# Patient Record
Sex: MALE | Race: WHITE | NOT HISPANIC OR LATINO | Employment: OTHER | URBAN - METROPOLITAN AREA
[De-identification: names, ages, dates, MRNs, and addresses within clinical notes are randomized per-mention and may not be internally consistent; named-entity substitution may affect disease eponyms.]

---

## 2019-07-03 PROBLEM — R10.13 ABDOMINAL PAIN, EPIGASTRIC: Status: ACTIVE | Noted: 2019-07-03

## 2021-03-24 ENCOUNTER — HOSPITAL ENCOUNTER (OUTPATIENT)
Dept: SLEEP MEDICINE | Facility: HOSPITAL | Age: 61
Discharge: HOME | End: 2021-03-24
Attending: FAMILY MEDICINE
Payer: MEDICARE

## 2021-03-24 DIAGNOSIS — G47.33 OBSTRUCTIVE SLEEP APNEA (ADULT) (PEDIATRIC): ICD-10-CM

## 2021-03-24 PROCEDURE — 95810 POLYSOM 6/> YRS 4/> PARAM: CPT

## 2021-07-08 PROBLEM — R06.03 ACUTE RESPIRATORY DISTRESS: Status: ACTIVE | Noted: 2021-07-08

## 2021-07-08 PROBLEM — J44.1 COPD EXACERBATION: Status: ACTIVE | Noted: 2021-07-08

## 2021-07-08 PROBLEM — R06.02 SHORTNESS OF BREATH: Status: ACTIVE | Noted: 2021-07-08

## 2021-07-08 PROBLEM — I50.9 CHF EXACERBATION: Status: ACTIVE | Noted: 2021-07-08

## 2021-07-09 PROBLEM — J96.11 CHRONIC RESPIRATORY FAILURE WITH HYPOXIA: Status: ACTIVE | Noted: 2021-07-09

## 2021-07-09 PROBLEM — F17.210 SMOKING GREATER THAN 40 PACK YEARS: Status: ACTIVE | Noted: 2021-07-09

## 2021-07-09 PROBLEM — N18.4 CKD (CHRONIC KIDNEY DISEASE), STAGE IV: Status: ACTIVE | Noted: 2021-07-09

## 2021-07-11 PROBLEM — J96.21 ACUTE ON CHRONIC RESPIRATORY FAILURE WITH HYPOXEMIA: Status: ACTIVE | Noted: 2021-07-08

## 2021-07-13 PROBLEM — E87.5 HYPERKALEMIA: Status: ACTIVE | Noted: 2021-07-13

## 2022-04-05 PROBLEM — I21.4 NSTEMI (NON-ST ELEVATED MYOCARDIAL INFARCTION): Status: ACTIVE | Noted: 2022-04-05

## 2022-04-05 PROBLEM — G92.9 ENCEPHALOPATHY, TOXIC: Status: ACTIVE | Noted: 2022-04-05

## 2022-04-05 PROBLEM — A41.9 SEVERE SEPSIS: Status: ACTIVE | Noted: 2022-04-05

## 2022-04-05 PROBLEM — F19.10 POLYSUBSTANCE ABUSE: Status: ACTIVE | Noted: 2022-04-05

## 2022-04-05 PROBLEM — I48.0 PAROXYSMAL ATRIAL FIBRILLATION: Status: ACTIVE | Noted: 2022-04-05

## 2022-04-05 PROBLEM — I50.40 HEART FAILURE WITH REDUCED EJECTION FRACTION AND DIASTOLIC DYSFUNCTION: Status: ACTIVE | Noted: 2022-04-05

## 2022-04-05 PROBLEM — R16.0 LIVER MASS: Status: ACTIVE | Noted: 2022-04-05

## 2022-04-05 PROBLEM — J18.9 LEFT LOWER LOBE PNEUMONIA: Status: ACTIVE | Noted: 2022-04-05

## 2022-04-05 PROBLEM — R65.20 SEVERE SEPSIS: Status: ACTIVE | Noted: 2022-04-05

## 2022-04-06 PROBLEM — E63.9 INADEQUATE DIETARY ENERGY INTAKE: Status: ACTIVE | Noted: 2022-04-06

## 2022-04-08 PROBLEM — R63.8 INADEQUATE ORAL INTAKE: Status: ACTIVE | Noted: 2022-04-06

## 2022-04-10 PROBLEM — F05 VASCULAR DEMENTIA WITH DELIRIUM: Status: ACTIVE | Noted: 2022-04-10

## 2022-04-10 PROBLEM — Z79.899 POLYPHARMACY: Status: ACTIVE | Noted: 2022-04-10

## 2022-04-10 PROBLEM — R78.81 ENTEROCOCCAL BACTEREMIA: Status: ACTIVE | Noted: 2022-04-10

## 2022-04-10 PROBLEM — Z86.73 HISTORY OF CEREBROVASCULAR ACCIDENT (CVA) DUE TO ISCHEMIA: Status: ACTIVE | Noted: 2022-04-10

## 2022-04-10 PROBLEM — K76.82 HEPATIC ENCEPHALOPATHY: Status: ACTIVE | Noted: 2022-04-10

## 2022-04-10 PROBLEM — E03.9 HYPOTHYROIDISM: Status: ACTIVE | Noted: 2022-04-10

## 2022-04-10 PROBLEM — I25.10 CAD (CORONARY ARTERY DISEASE): Status: ACTIVE | Noted: 2022-04-10

## 2022-04-10 PROBLEM — R74.01 TRANSAMINASEMIA: Status: ACTIVE | Noted: 2022-04-10

## 2022-04-10 PROBLEM — E66.09 CLASS 1 OBESITY DUE TO EXCESS CALORIES WITH SERIOUS COMORBIDITY AND BODY MASS INDEX (BMI) OF 33.0 TO 33.9 IN ADULT: Status: ACTIVE | Noted: 2022-04-10

## 2022-04-10 PROBLEM — K74.60 CIRRHOSIS OF LIVER WITHOUT ASCITES: Status: ACTIVE | Noted: 2022-04-10

## 2022-04-10 PROBLEM — F01.50 VASCULAR DEMENTIA WITH DELIRIUM: Status: ACTIVE | Noted: 2022-04-10

## 2022-04-10 PROBLEM — R79.89 TROPONIN LEVEL ELEVATED: Status: ACTIVE | Noted: 2022-04-10

## 2022-04-10 PROBLEM — I50.22 CHRONIC HFREF (HEART FAILURE WITH REDUCED EJECTION FRACTION): Status: ACTIVE | Noted: 2022-04-10

## 2022-04-10 PROBLEM — R13.12 OROPHARYNGEAL DYSPHAGIA: Status: ACTIVE | Noted: 2022-04-10

## 2022-04-10 PROBLEM — F15.20 METHAMPHETAMINE DEPENDENCE: Status: ACTIVE | Noted: 2022-04-10

## 2022-04-10 PROBLEM — J44.9 STAGE 3 SEVERE COPD BY GOLD CLASSIFICATION: Status: ACTIVE | Noted: 2021-07-09

## 2022-04-10 PROBLEM — E87.6 HYPOKALEMIA: Status: ACTIVE | Noted: 2022-04-10

## 2022-04-10 PROBLEM — B95.2 ENTEROCOCCAL BACTEREMIA: Status: ACTIVE | Noted: 2022-04-10

## 2022-04-12 PROBLEM — R63.8 INADEQUATE ORAL INTAKE: Status: ACTIVE | Noted: 2022-04-12

## 2022-07-25 PROBLEM — S22.081A BURST FRACTURE OF T12 VERTEBRA: Status: ACTIVE | Noted: 2022-07-25

## 2022-07-26 PROBLEM — F13.20 BENZODIAZEPINE DEPENDENCE: Status: ACTIVE | Noted: 2022-07-26

## 2022-07-26 PROBLEM — D50.9 MICROCYTIC ANEMIA: Status: ACTIVE | Noted: 2022-07-26

## 2022-07-26 PROBLEM — F33.1 MDD (MAJOR DEPRESSIVE DISORDER), RECURRENT EPISODE, MODERATE: Status: ACTIVE | Noted: 2022-07-26

## 2022-07-26 PROBLEM — J96.11 CHRONIC RESPIRATORY FAILURE WITH HYPOXIA: Status: ACTIVE | Noted: 2021-07-08

## 2022-07-26 PROBLEM — N18.32 STAGE 3B CHRONIC KIDNEY DISEASE: Status: ACTIVE | Noted: 2022-07-26

## 2022-07-28 PROBLEM — E63.9 INADEQUATE DIETARY ENERGY INTAKE: Status: ACTIVE | Noted: 2022-07-28

## 2022-08-13 PROBLEM — D72.819 LEUKOPENIA: Status: ACTIVE | Noted: 2022-08-13

## 2022-08-13 PROBLEM — E87.20 METABOLIC ACIDEMIA: Status: ACTIVE | Noted: 2022-08-13

## 2022-08-13 PROBLEM — R73.9 STEROID-INDUCED HYPERGLYCEMIA: Status: ACTIVE | Noted: 2022-08-13

## 2022-08-13 PROBLEM — D69.6 THROMBOCYTOPENIA: Status: ACTIVE | Noted: 2022-08-13

## 2022-08-13 PROBLEM — N17.9 AKI (ACUTE KIDNEY INJURY): Status: ACTIVE | Noted: 2022-08-13

## 2022-08-13 PROBLEM — T38.0X5A STEROID-INDUCED HYPERGLYCEMIA: Status: ACTIVE | Noted: 2022-08-13

## 2022-08-21 PROBLEM — N17.9 AKI (ACUTE KIDNEY INJURY): Status: RESOLVED | Noted: 2022-08-13 | Resolved: 2022-08-21

## 2022-08-21 PROBLEM — R13.12 OROPHARYNGEAL DYSPHAGIA: Status: RESOLVED | Noted: 2022-04-10 | Resolved: 2022-08-21

## 2022-08-21 PROBLEM — J96.11 CHRONIC RESPIRATORY FAILURE WITH HYPOXIA: Status: RESOLVED | Noted: 2021-07-08 | Resolved: 2022-08-21

## 2022-08-21 PROBLEM — E03.9 HYPOTHYROIDISM: Status: RESOLVED | Noted: 2022-04-10 | Resolved: 2022-08-21

## 2022-08-21 PROBLEM — E87.6 HYPOKALEMIA: Status: RESOLVED | Noted: 2022-04-10 | Resolved: 2022-08-21

## 2022-08-21 PROBLEM — Z86.73 HISTORY OF CEREBROVASCULAR ACCIDENT (CVA) DUE TO ISCHEMIA: Status: RESOLVED | Noted: 2022-04-10 | Resolved: 2022-08-21

## 2022-08-21 PROBLEM — F17.210 SMOKING GREATER THAN 40 PACK YEARS: Status: RESOLVED | Noted: 2021-07-09 | Resolved: 2022-08-21

## 2022-08-25 ENCOUNTER — HOSPITAL ENCOUNTER (INPATIENT)
Facility: HOSPITAL | Age: 62
LOS: 4 days | Discharge: NURSING, REHAB OR LONG-TERM CARE | DRG: 638 | End: 2022-08-29
Attending: STUDENT IN AN ORGANIZED HEALTH CARE EDUCATION/TRAINING PROGRAM | Admitting: INTERNAL MEDICINE
Payer: MEDICARE

## 2022-08-25 DIAGNOSIS — R55 SYNCOPE: ICD-10-CM

## 2022-08-25 DIAGNOSIS — D64.9 ANEMIA, UNSPECIFIED TYPE: ICD-10-CM

## 2022-08-25 DIAGNOSIS — N28.9 RENAL INSUFFICIENCY: ICD-10-CM

## 2022-08-25 DIAGNOSIS — E16.2 HYPOGLYCEMIA: Primary | ICD-10-CM

## 2022-08-25 LAB
ALBUMIN SERPL BCP-MCNC: 2.8 G/DL (ref 3.5–5.2)
ALP SERPL-CCNC: 117 U/L (ref 55–135)
ALT SERPL W/O P-5'-P-CCNC: 14 U/L (ref 10–44)
ANION GAP SERPL CALC-SCNC: 8 MMOL/L (ref 8–16)
AST SERPL-CCNC: 26 U/L (ref 10–40)
BASOPHILS # BLD AUTO: 0.02 K/UL (ref 0–0.2)
BASOPHILS NFR BLD: 0.2 % (ref 0–1.9)
BILIRUB SERPL-MCNC: 0.4 MG/DL (ref 0.1–1)
BUN SERPL-MCNC: 37 MG/DL (ref 8–23)
CALCIUM SERPL-MCNC: 8.9 MG/DL (ref 8.7–10.5)
CHLORIDE SERPL-SCNC: 106 MMOL/L (ref 95–110)
CO2 SERPL-SCNC: 26 MMOL/L (ref 23–29)
CREAT SERPL-MCNC: 3.3 MG/DL (ref 0.5–1.4)
DIFFERENTIAL METHOD: ABNORMAL
EOSINOPHIL # BLD AUTO: 0.1 K/UL (ref 0–0.5)
EOSINOPHIL NFR BLD: 0.7 % (ref 0–8)
ERYTHROCYTE [DISTWIDTH] IN BLOOD BY AUTOMATED COUNT: 15.7 % (ref 11.5–14.5)
EST. GFR  (NO RACE VARIABLE): 20.3 ML/MIN/1.73 M^2
GLUCOSE SERPL-MCNC: 17 MG/DL (ref 70–110)
HCT VFR BLD AUTO: 33.5 % (ref 40–54)
HGB BLD-MCNC: 10.4 G/DL (ref 14–18)
IMM GRANULOCYTES # BLD AUTO: 0.05 K/UL (ref 0–0.04)
IMM GRANULOCYTES NFR BLD AUTO: 0.4 % (ref 0–0.5)
LYMPHOCYTES # BLD AUTO: 0.8 K/UL (ref 1–4.8)
LYMPHOCYTES NFR BLD: 6.1 % (ref 18–48)
MAGNESIUM SERPL-MCNC: 2.4 MG/DL (ref 1.6–2.6)
MCH RBC QN AUTO: 25.1 PG (ref 27–31)
MCHC RBC AUTO-ENTMCNC: 31 G/DL (ref 32–36)
MCV RBC AUTO: 81 FL (ref 82–98)
MONOCYTES # BLD AUTO: 0.9 K/UL (ref 0.3–1)
MONOCYTES NFR BLD: 7 % (ref 4–15)
NEUTROPHILS # BLD AUTO: 10.8 K/UL (ref 1.8–7.7)
NEUTROPHILS NFR BLD: 85.6 % (ref 38–73)
NRBC BLD-RTO: 0 /100 WBC
PLATELET # BLD AUTO: 175 K/UL (ref 150–450)
PMV BLD AUTO: 11.5 FL (ref 9.2–12.9)
POCT GLUCOSE: 106 MG/DL (ref 70–110)
POCT GLUCOSE: 137 MG/DL (ref 70–110)
POCT GLUCOSE: 176 MG/DL (ref 70–110)
POCT GLUCOSE: 311 MG/DL (ref 70–110)
POCT GLUCOSE: 412 MG/DL (ref 70–110)
POCT GLUCOSE: 45 MG/DL (ref 70–110)
POCT GLUCOSE: 48 MG/DL (ref 70–110)
POCT GLUCOSE: 51 MG/DL (ref 70–110)
POCT GLUCOSE: <20 MG/DL (ref 70–110)
POCT GLUCOSE: <20 MG/DL (ref 70–110)
POTASSIUM SERPL-SCNC: 3.7 MMOL/L (ref 3.5–5.1)
PROT SERPL-MCNC: 7.4 G/DL (ref 6–8.4)
RBC # BLD AUTO: 4.15 M/UL (ref 4.6–6.2)
SODIUM SERPL-SCNC: 140 MMOL/L (ref 136–145)
TROPONIN I SERPL DL<=0.01 NG/ML-MCNC: 21.9 PG/ML (ref 0–60)
WBC # BLD AUTO: 12.62 K/UL (ref 3.9–12.7)

## 2022-08-25 PROCEDURE — 96376 TX/PRO/DX INJ SAME DRUG ADON: CPT

## 2022-08-25 PROCEDURE — 93005 ELECTROCARDIOGRAM TRACING: CPT

## 2022-08-25 PROCEDURE — 80053 COMPREHEN METABOLIC PANEL: CPT | Performed by: STUDENT IN AN ORGANIZED HEALTH CARE EDUCATION/TRAINING PROGRAM

## 2022-08-25 PROCEDURE — 84484 ASSAY OF TROPONIN QUANT: CPT | Performed by: STUDENT IN AN ORGANIZED HEALTH CARE EDUCATION/TRAINING PROGRAM

## 2022-08-25 PROCEDURE — 83735 ASSAY OF MAGNESIUM: CPT | Performed by: STUDENT IN AN ORGANIZED HEALTH CARE EDUCATION/TRAINING PROGRAM

## 2022-08-25 PROCEDURE — 20000000 HC ICU ROOM

## 2022-08-25 PROCEDURE — 82962 GLUCOSE BLOOD TEST: CPT

## 2022-08-25 PROCEDURE — 93010 EKG 12-LEAD: ICD-10-PCS | Mod: ,,, | Performed by: INTERNAL MEDICINE

## 2022-08-25 PROCEDURE — 36415 COLL VENOUS BLD VENIPUNCTURE: CPT | Performed by: STUDENT IN AN ORGANIZED HEALTH CARE EDUCATION/TRAINING PROGRAM

## 2022-08-25 PROCEDURE — 93010 ELECTROCARDIOGRAM REPORT: CPT | Mod: ,,, | Performed by: INTERNAL MEDICINE

## 2022-08-25 PROCEDURE — 85025 COMPLETE CBC W/AUTO DIFF WBC: CPT | Performed by: STUDENT IN AN ORGANIZED HEALTH CARE EDUCATION/TRAINING PROGRAM

## 2022-08-25 PROCEDURE — 25000003 PHARM REV CODE 250: Performed by: STUDENT IN AN ORGANIZED HEALTH CARE EDUCATION/TRAINING PROGRAM

## 2022-08-25 PROCEDURE — 96374 THER/PROPH/DIAG INJ IV PUSH: CPT

## 2022-08-25 PROCEDURE — 63600175 PHARM REV CODE 636 W HCPCS: Mod: JB | Performed by: STUDENT IN AN ORGANIZED HEALTH CARE EDUCATION/TRAINING PROGRAM

## 2022-08-25 PROCEDURE — 27000207 HC ISOLATION

## 2022-08-25 PROCEDURE — 99291 CRITICAL CARE FIRST HOUR: CPT | Mod: 25

## 2022-08-25 RX ORDER — DEXTROSE 50 % IN WATER (D50W) INTRAVENOUS SYRINGE
25
Status: COMPLETED | OUTPATIENT
Start: 2022-08-25 | End: 2022-08-25

## 2022-08-25 RX ORDER — OCTREOTIDE ACETATE 100 UG/ML
100 INJECTION, SOLUTION INTRAVENOUS; SUBCUTANEOUS EVERY 6 HOURS
Status: DISCONTINUED | OUTPATIENT
Start: 2022-08-25 | End: 2022-08-26

## 2022-08-25 RX ORDER — DEXTROSE 50 % IN WATER (D50W) INTRAVENOUS SYRINGE
25
Status: DISCONTINUED | OUTPATIENT
Start: 2022-08-25 | End: 2022-08-26

## 2022-08-25 RX ORDER — ACETAMINOPHEN 325 MG/1
650 TABLET ORAL EVERY 8 HOURS PRN
Status: DISCONTINUED | OUTPATIENT
Start: 2022-08-25 | End: 2022-08-29 | Stop reason: HOSPADM

## 2022-08-25 RX ORDER — DEXTROSE MONOHYDRATE 100 MG/ML
INJECTION, SOLUTION INTRAVENOUS
Status: COMPLETED | OUTPATIENT
Start: 2022-08-25 | End: 2022-08-25

## 2022-08-25 RX ORDER — OCTREOTIDE ACETATE 100 UG/ML
100 INJECTION, SOLUTION INTRAVENOUS; SUBCUTANEOUS
Status: DISCONTINUED | OUTPATIENT
Start: 2022-08-25 | End: 2022-08-25

## 2022-08-25 RX ORDER — SODIUM CHLORIDE 0.9 % (FLUSH) 0.9 %
10 SYRINGE (ML) INJECTION
Status: DISCONTINUED | OUTPATIENT
Start: 2022-08-25 | End: 2022-08-29 | Stop reason: HOSPADM

## 2022-08-25 RX ORDER — TALC
6 POWDER (GRAM) TOPICAL NIGHTLY PRN
Status: DISCONTINUED | OUTPATIENT
Start: 2022-08-25 | End: 2022-08-29 | Stop reason: HOSPADM

## 2022-08-25 RX ORDER — FAMOTIDINE 10 MG/ML
20 INJECTION INTRAVENOUS DAILY
Status: DISCONTINUED | OUTPATIENT
Start: 2022-08-26 | End: 2022-08-26

## 2022-08-25 RX ORDER — ONDANSETRON 2 MG/ML
4 INJECTION INTRAMUSCULAR; INTRAVENOUS EVERY 8 HOURS PRN
Status: DISCONTINUED | OUTPATIENT
Start: 2022-08-25 | End: 2022-08-29 | Stop reason: HOSPADM

## 2022-08-25 RX ADMIN — OCTREOTIDE ACETATE 100 MCG: 100 INJECTION, SOLUTION INTRAVENOUS; SUBCUTANEOUS at 06:08

## 2022-08-25 RX ADMIN — DEXTROSE MONOHYDRATE 25 G: 25 INJECTION, SOLUTION INTRAVENOUS at 03:08

## 2022-08-25 RX ADMIN — DEXTROSE MONOHYDRATE 25 G: 25 INJECTION, SOLUTION INTRAVENOUS at 06:08

## 2022-08-25 RX ADMIN — DEXTROSE: 10 SOLUTION INTRAVENOUS at 04:08

## 2022-08-25 NOTE — ED NOTES
MD notified pt's mental status has not changed. Pt now has edema and redness to IV site of ultrasound guided IV. Pt IV was patent before administration of D50. Warm compress applied to site after IV removal.

## 2022-08-25 NOTE — ED PROVIDER NOTES
Encounter Date: 8/25/2022       History     Chief Complaint   Patient presents with    Hypoglycemia     Pt was seen in ED this morning. Was discharged and states he was not given anything to eat once back at nursing home. Nursing home reports low cbg. EMS reports cbg 36. 20G to left hand with D5W running.        62-year-old male with history of diabetes on (glimepiride, Trulicity, januvia, NovoLog,pioglitazone  ) hypertension brought in by EMS for altered mental status related to low glucose.  Patient was seen here early this morning with hypoglycemia but discharge because patient did not want further workup.  According to EMS, patient has not ate since being back at the nursing home.  Patient was found to be hypoglycemic by nursing staff and given glucagon.  Patient started on D5 by EMS.  Denies any vomiting, diarrhea, focal weakness, changes in vision      Review of patient's allergies indicates:   Allergen Reactions    Onion     Pork/porcine containing products     Shellfish containing products     Shrimp      Past Medical History:   Diagnosis Date    Anxiety     Arthritis     CHF (congestive heart failure)     Chronic kidney disease     COPD (chronic obstructive pulmonary disease)     Coronary artery disease     Diabetes mellitus     Erectile dysfunction     Hypertension     Necrotizing fasciitis of forearm     Peripheral neuropathy     Stroke     TIA    Thyroid disease      Past Surgical History:   Procedure Laterality Date    arm surgery Right     arthroscopy Right     knee    COLONOSCOPY N/A 7/3/2019    Procedure: COLONOSCOPY;  Surgeon: Jagdish Pollock MD;  Location: [facility];  Service: Endoscopy;  Laterality: N/A;    ESOPHAGOGASTRODUODENOSCOPY N/A 7/3/2019    Procedure: ESOPHAGOGASTRODUODENOSCOPY (EGD);  Surgeon: Jagdish Pollock MD;  Location: [facility];  Service: Endoscopy;  Laterality: N/A;    EYE SURGERY      FUSION, SPINE, POSTERIOR APPROACH N/A 7/29/2022    Procedure: FUSION,SPINE,POSTERIOR  APPROACH, T9-L3;  Surgeon: Luis E Lawson MD;  Location: [facility] OR;  Service: Orthopedics;  Laterality: N/A;    ROTATOR CUFF REPAIR Right     TONSILLECTOMY      VERTEBRAL CORPECTOMY N/A 7/27/2022    Procedure: CORPECTOMY T12;  Surgeon: Luis E Lawson MD;  Location: [facility] OR;  Service: Orthopedics;  Laterality: N/A;     Family History   Problem Relation Age of Onset    COPD Mother     Diabetes Father     Cancer Brother      Social History     Tobacco Use    Smoking status: Current Every Day Smoker     Packs/day: 1.00     Years: 40.00     Pack years: 40.00     Types: Cigarettes    Smokeless tobacco: Never Used   Substance Use Topics    Alcohol use: Not Currently    Drug use: Not Currently     Review of Systems   Constitutional:  Positive for fatigue.   HENT: Negative.     Respiratory: Negative.     Cardiovascular: Negative.    Gastrointestinal: Negative.    Genitourinary: Negative.    Musculoskeletal: Negative.    Skin: Negative.    Neurological:  Positive for weakness.   Psychiatric/Behavioral:  Positive for confusion.    All other systems reviewed and are negative.    Physical Exam     Initial Vitals [08/25/22 1349]   BP Pulse Resp Temp SpO2   139/63 68 16 97.7 °F (36.5 °C) 98 %      MAP       --         Physical Exam    Nursing note and vitals reviewed.  Constitutional: Vital signs are normal. He appears well-developed and well-nourished.   HENT:   Head: Normocephalic and atraumatic.   Eyes: Conjunctivae and lids are normal.   Neck: Trachea normal. Neck supple.   Cardiovascular:  Normal rate, regular rhythm, normal heart sounds and normal pulses.           Pulmonary/Chest: Breath sounds normal. He has no wheezes. He has no rhonchi.   Abdominal: Abdomen is soft. Bowel sounds are normal. He exhibits no distension. There is no abdominal tenderness. There is no rebound and no guarding.   Musculoskeletal:         General: Normal range of motion.      Cervical back: Neck supple.     Neurological: He is alert and  oriented to person, place, and time. He has normal strength. GCS eye subscore is 4. GCS verbal subscore is 5. GCS motor subscore is 6.   Skin: Skin is warm. Capillary refill takes less than 2 seconds.   Psychiatric: He has a normal mood and affect. His speech is normal. Thought content normal.       ED Course   Critical Care    Date/Time: 8/25/2022 3:37 PM  Performed by: Kaushik Munoz MD  Authorized by: Kaushik Munoz MD   Direct patient critical care time: 10 minutes  Additional history critical care time: 5 minutes  Ordering / reviewing critical care time: 5 minutes  Documentation critical care time: 5 minutes  Other critical care time: 5 minutes  Total critical care time (exclusive of procedural time) : 30 minutes  Critical care time was exclusive of separately billable procedures and treating other patients.  Critical care was necessary to treat or prevent imminent or life-threatening deterioration of the following conditions: endocrine crisis, CNS failure or compromise and metabolic crisis.  Critical care was time spent personally by me on the following activities: evaluation of patient's response to treatment, examination of patient, ordering and performing treatments and interventions, obtaining history from patient or surrogate, ordering and review of laboratory studies, ordering and review of radiographic studies, re-evaluation of patient's condition and pulse oximetry.      Labs Reviewed   CBC W/ AUTO DIFFERENTIAL - Abnormal; Notable for the following components:       Result Value    RBC 4.15 (*)     Hemoglobin 10.4 (*)     Hematocrit 33.5 (*)     MCV 81 (*)     MCH 25.1 (*)     MCHC 31.0 (*)     RDW 15.7 (*)     Gran # (ANC) 10.8 (*)     Immature Grans (Abs) 0.05 (*)     Lymph # 0.8 (*)     Gran % 85.6 (*)     Lymph % 6.1 (*)     All other components within normal limits   COMPREHENSIVE METABOLIC PANEL - Abnormal; Notable for the following components:    Glucose 17 (*)     BUN 37 (*)     Creatinine 3.3  (*)     Albumin 2.8 (*)     eGFR 20.3 (*)     All other components within normal limits    Narrative:     GLU   critical result(s) called and verbal readback obtained from   SARAN Harris by [facility] 08/25/2022 16:24   POCT GLUCOSE - Abnormal; Notable for the following components:    POCT Glucose 51 (*)     All other components within normal limits   POCT GLUCOSE - Abnormal; Notable for the following components:    POCT Glucose <20 (*)     All other components within normal limits   POCT GLUCOSE - Abnormal; Notable for the following components:    POCT Glucose <20 (*)     All other components within normal limits   MAGNESIUM   TROPONIN I HIGH SENSITIVITY   POCT GLUCOSE MONITORING CONTINUOUS          Imaging Results    None          Medications   dextrose 50 % in water (D50W) injection 25 g (25 g Intravenous Given 8/25/22 1538)   dextrose 10 % infusion ( Intravenous New Bag 8/25/22 1617)   dextrose 50 % in water (D50W) injection 25 g (25 g Intravenous Given 8/25/22 1556)                 ED Course as of 08/30/22 1830   Thu Aug 25, 2022   1537 Attempted to feed patient without success.  Recheck glucose less than 20.  Will give D 50.  Will start D10.  Admit patient for further medical management [HD]   1648 Labs imaging noted.  Will admit patient for evaluation of hypoglycemia.  Patient will be admitted to the ICU for closer monitoring.  Will hold off on all diabetic medication [HD]      ED Course User Index  [HD] aKushik Munoz MD             Clinical Impression:   Final diagnoses:  [E16.2] Hypoglycemia (Primary)  [N28.9] Renal insufficiency  [D64.9] Anemia, unspecified type          ED Disposition Condition    Admit                 Kaushik Munoz MD  08/30/22 4171

## 2022-08-25 NOTE — ED NOTES
Pt has been refusing the meal trays given. Pt given another meal tray with turkey and the patient states he will try it. Pt educated on food and diabetes. Pt AAOx4.

## 2022-08-25 NOTE — ED NOTES
Checked progress of pt eating, found pt coughing. Pt altered. Rechecked pt's sugar. MD notified of critical.

## 2022-08-25 NOTE — ED NOTES
Pt has returned to baseline mental status. Pt diaper and gown changed. Pt saturated in urine. Pt tolerated well.

## 2022-08-25 NOTE — ED NOTES
MD notified of patients glucose. Pt given cookies, chips, and apple juice. Pt only willing to eat these things.

## 2022-08-26 PROBLEM — E16.2 HYPOGLYCEMIA: Status: ACTIVE | Noted: 2022-08-26

## 2022-08-26 LAB
ALBUMIN SERPL BCP-MCNC: 2.6 G/DL (ref 3.5–5.2)
ALP SERPL-CCNC: 111 U/L (ref 55–135)
ALT SERPL W/O P-5'-P-CCNC: 14 U/L (ref 10–44)
ANION GAP SERPL CALC-SCNC: 7 MMOL/L (ref 8–16)
AST SERPL-CCNC: 22 U/L (ref 10–40)
BASOPHILS # BLD AUTO: 0.04 K/UL (ref 0–0.2)
BASOPHILS NFR BLD: 0.6 % (ref 0–1.9)
BILIRUB SERPL-MCNC: 0.5 MG/DL (ref 0.1–1)
BUN SERPL-MCNC: 40 MG/DL (ref 8–23)
CALCIUM SERPL-MCNC: 8.2 MG/DL (ref 8.7–10.5)
CHLORIDE SERPL-SCNC: 104 MMOL/L (ref 95–110)
CO2 SERPL-SCNC: 23 MMOL/L (ref 23–29)
CREAT SERPL-MCNC: 3.5 MG/DL (ref 0.5–1.4)
DIFFERENTIAL METHOD: ABNORMAL
EOSINOPHIL # BLD AUTO: 0.1 K/UL (ref 0–0.5)
EOSINOPHIL NFR BLD: 1.6 % (ref 0–8)
ERYTHROCYTE [DISTWIDTH] IN BLOOD BY AUTOMATED COUNT: 15.6 % (ref 11.5–14.5)
EST. GFR  (NO RACE VARIABLE): 18.9 ML/MIN/1.73 M^2
GLUCOSE SERPL-MCNC: 270 MG/DL (ref 70–110)
HCT VFR BLD AUTO: 29.3 % (ref 40–54)
HGB BLD-MCNC: 9.4 G/DL (ref 14–18)
IMM GRANULOCYTES # BLD AUTO: 0.03 K/UL (ref 0–0.04)
IMM GRANULOCYTES NFR BLD AUTO: 0.4 % (ref 0–0.5)
LYMPHOCYTES # BLD AUTO: 1.3 K/UL (ref 1–4.8)
LYMPHOCYTES NFR BLD: 18.9 % (ref 18–48)
MCH RBC QN AUTO: 25.5 PG (ref 27–31)
MCHC RBC AUTO-ENTMCNC: 32.1 G/DL (ref 32–36)
MCV RBC AUTO: 79 FL (ref 82–98)
MONOCYTES # BLD AUTO: 0.7 K/UL (ref 0.3–1)
MONOCYTES NFR BLD: 10.7 % (ref 4–15)
NEUTROPHILS # BLD AUTO: 4.6 K/UL (ref 1.8–7.7)
NEUTROPHILS NFR BLD: 67.8 % (ref 38–73)
NRBC BLD-RTO: 0 /100 WBC
PLATELET # BLD AUTO: 158 K/UL (ref 150–450)
PMV BLD AUTO: 11.2 FL (ref 9.2–12.9)
POCT GLUCOSE: 139 MG/DL (ref 70–110)
POCT GLUCOSE: 274 MG/DL (ref 70–110)
POCT GLUCOSE: 283 MG/DL (ref 70–110)
POCT GLUCOSE: 304 MG/DL (ref 70–110)
POCT GLUCOSE: 308 MG/DL (ref 70–110)
POCT GLUCOSE: 327 MG/DL (ref 70–110)
POCT GLUCOSE: 403 MG/DL (ref 70–110)
POTASSIUM SERPL-SCNC: 4.5 MMOL/L (ref 3.5–5.1)
PROT SERPL-MCNC: 6.5 G/DL (ref 6–8.4)
RBC # BLD AUTO: 3.69 M/UL (ref 4.6–6.2)
SODIUM SERPL-SCNC: 134 MMOL/L (ref 136–145)
WBC # BLD AUTO: 6.84 K/UL (ref 3.9–12.7)

## 2022-08-26 PROCEDURE — 63600175 PHARM REV CODE 636 W HCPCS: Performed by: STUDENT IN AN ORGANIZED HEALTH CARE EDUCATION/TRAINING PROGRAM

## 2022-08-26 PROCEDURE — 27000207 HC ISOLATION

## 2022-08-26 PROCEDURE — 97166 OT EVAL MOD COMPLEX 45 MIN: CPT

## 2022-08-26 PROCEDURE — 97162 PT EVAL MOD COMPLEX 30 MIN: CPT

## 2022-08-26 PROCEDURE — 99900035 HC TECH TIME PER 15 MIN (STAT)

## 2022-08-26 PROCEDURE — 36415 COLL VENOUS BLD VENIPUNCTURE: CPT | Performed by: STUDENT IN AN ORGANIZED HEALTH CARE EDUCATION/TRAINING PROGRAM

## 2022-08-26 PROCEDURE — 99900031 HC PATIENT EDUCATION (STAT)

## 2022-08-26 PROCEDURE — 94761 N-INVAS EAR/PLS OXIMETRY MLT: CPT

## 2022-08-26 PROCEDURE — 25000003 PHARM REV CODE 250: Performed by: INTERNAL MEDICINE

## 2022-08-26 PROCEDURE — 80053 COMPREHEN METABOLIC PANEL: CPT | Performed by: STUDENT IN AN ORGANIZED HEALTH CARE EDUCATION/TRAINING PROGRAM

## 2022-08-26 PROCEDURE — 85025 COMPLETE CBC W/AUTO DIFF WBC: CPT | Performed by: STUDENT IN AN ORGANIZED HEALTH CARE EDUCATION/TRAINING PROGRAM

## 2022-08-26 PROCEDURE — S4991 NICOTINE PATCH NONLEGEND: HCPCS | Performed by: INTERNAL MEDICINE

## 2022-08-26 PROCEDURE — 94640 AIRWAY INHALATION TREATMENT: CPT

## 2022-08-26 PROCEDURE — 20000000 HC ICU ROOM

## 2022-08-26 PROCEDURE — 63600175 PHARM REV CODE 636 W HCPCS: Performed by: INTERNAL MEDICINE

## 2022-08-26 PROCEDURE — 25000242 PHARM REV CODE 250 ALT 637 W/ HCPCS: Performed by: INTERNAL MEDICINE

## 2022-08-26 RX ORDER — NAPROXEN SODIUM 220 MG/1
81 TABLET, FILM COATED ORAL DAILY
Status: DISCONTINUED | OUTPATIENT
Start: 2022-08-26 | End: 2022-08-29 | Stop reason: HOSPADM

## 2022-08-26 RX ORDER — DULOXETIN HYDROCHLORIDE 30 MG/1
30 CAPSULE, DELAYED RELEASE ORAL DAILY
Status: DISCONTINUED | OUTPATIENT
Start: 2022-08-26 | End: 2022-08-29

## 2022-08-26 RX ORDER — IPRATROPIUM BROMIDE AND ALBUTEROL SULFATE 2.5; .5 MG/3ML; MG/3ML
3 SOLUTION RESPIRATORY (INHALATION)
Status: DISCONTINUED | OUTPATIENT
Start: 2022-08-26 | End: 2022-08-29 | Stop reason: HOSPADM

## 2022-08-26 RX ORDER — LEVOTHYROXINE SODIUM 50 UG/1
50 TABLET ORAL DAILY
Status: DISCONTINUED | OUTPATIENT
Start: 2022-08-26 | End: 2022-08-29 | Stop reason: HOSPADM

## 2022-08-26 RX ORDER — CLONAZEPAM 0.5 MG/1
0.5 TABLET ORAL 2 TIMES DAILY
Status: DISCONTINUED | OUTPATIENT
Start: 2022-08-26 | End: 2022-08-26

## 2022-08-26 RX ORDER — QUETIAPINE FUMARATE 50 MG/1
50 TABLET, FILM COATED ORAL NIGHTLY
Status: DISCONTINUED | OUTPATIENT
Start: 2022-08-26 | End: 2022-08-29 | Stop reason: HOSPADM

## 2022-08-26 RX ORDER — PANTOPRAZOLE SODIUM 40 MG/1
40 TABLET, DELAYED RELEASE ORAL DAILY
Status: DISCONTINUED | OUTPATIENT
Start: 2022-08-26 | End: 2022-08-29 | Stop reason: HOSPADM

## 2022-08-26 RX ORDER — ATORVASTATIN CALCIUM 40 MG/1
40 TABLET, FILM COATED ORAL DAILY
Status: DISCONTINUED | OUTPATIENT
Start: 2022-08-26 | End: 2022-08-29 | Stop reason: HOSPADM

## 2022-08-26 RX ORDER — QUETIAPINE FUMARATE 25 MG/1
50 TABLET, FILM COATED ORAL 2 TIMES DAILY
Status: DISCONTINUED | OUTPATIENT
Start: 2022-08-26 | End: 2022-08-26

## 2022-08-26 RX ORDER — TRAZODONE HYDROCHLORIDE 50 MG/1
50 TABLET ORAL NIGHTLY
Status: DISCONTINUED | OUTPATIENT
Start: 2022-08-26 | End: 2022-08-26

## 2022-08-26 RX ORDER — GLUCAGON 1 MG
1 KIT INJECTION
Status: DISCONTINUED | OUTPATIENT
Start: 2022-08-26 | End: 2022-08-29 | Stop reason: HOSPADM

## 2022-08-26 RX ORDER — INSULIN ASPART 100 [IU]/ML
1-10 INJECTION, SOLUTION INTRAVENOUS; SUBCUTANEOUS
Status: DISCONTINUED | OUTPATIENT
Start: 2022-08-26 | End: 2022-08-29 | Stop reason: HOSPADM

## 2022-08-26 RX ORDER — DULOXETIN HYDROCHLORIDE 30 MG/1
30 CAPSULE, DELAYED RELEASE ORAL 2 TIMES DAILY
Status: DISCONTINUED | OUTPATIENT
Start: 2022-08-26 | End: 2022-08-26

## 2022-08-26 RX ORDER — CLONAZEPAM 0.5 MG/1
0.5 TABLET ORAL 2 TIMES DAILY PRN
Status: DISCONTINUED | OUTPATIENT
Start: 2022-08-26 | End: 2022-08-29 | Stop reason: HOSPADM

## 2022-08-26 RX ORDER — RIVASTIGMINE 4.6 MG/24H
1 PATCH, EXTENDED RELEASE TRANSDERMAL DAILY
Status: DISCONTINUED | OUTPATIENT
Start: 2022-08-26 | End: 2022-08-29 | Stop reason: HOSPADM

## 2022-08-26 RX ORDER — POLYETHYLENE GLYCOL 3350 17 G/17G
17 POWDER, FOR SOLUTION ORAL DAILY
Status: DISCONTINUED | OUTPATIENT
Start: 2022-08-26 | End: 2022-08-29 | Stop reason: HOSPADM

## 2022-08-26 RX ORDER — ARFORMOTEROL TARTRATE 15 UG/2ML
15 SOLUTION RESPIRATORY (INHALATION) 2 TIMES DAILY
Status: DISCONTINUED | OUTPATIENT
Start: 2022-08-26 | End: 2022-08-29 | Stop reason: HOSPADM

## 2022-08-26 RX ORDER — BUDESONIDE 0.5 MG/2ML
0.5 INHALANT ORAL EVERY 12 HOURS
Status: DISCONTINUED | OUTPATIENT
Start: 2022-08-26 | End: 2022-08-29 | Stop reason: HOSPADM

## 2022-08-26 RX ORDER — MUPIROCIN 20 MG/G
OINTMENT TOPICAL 2 TIMES DAILY
Status: DISCONTINUED | OUTPATIENT
Start: 2022-08-26 | End: 2022-08-29 | Stop reason: HOSPADM

## 2022-08-26 RX ORDER — DOXAZOSIN 1 MG/1
2 TABLET ORAL DAILY
Status: DISCONTINUED | OUTPATIENT
Start: 2022-08-26 | End: 2022-08-26

## 2022-08-26 RX ORDER — LACTULOSE 10 G/15ML
10 SOLUTION ORAL 2 TIMES DAILY
Refills: 0 | Status: DISCONTINUED | OUTPATIENT
Start: 2022-08-26 | End: 2022-08-29 | Stop reason: HOSPADM

## 2022-08-26 RX ORDER — FLUTICASONE FUROATE AND VILANTEROL 100; 25 UG/1; UG/1
1 POWDER RESPIRATORY (INHALATION) DAILY
Status: DISCONTINUED | OUTPATIENT
Start: 2022-08-26 | End: 2022-08-26

## 2022-08-26 RX ORDER — IBUPROFEN 200 MG
16 TABLET ORAL
Status: DISCONTINUED | OUTPATIENT
Start: 2022-08-26 | End: 2022-08-29 | Stop reason: HOSPADM

## 2022-08-26 RX ORDER — TAMSULOSIN HYDROCHLORIDE 0.4 MG/1
0.4 CAPSULE ORAL DAILY
Status: DISCONTINUED | OUTPATIENT
Start: 2022-08-26 | End: 2022-08-29 | Stop reason: HOSPADM

## 2022-08-26 RX ORDER — METOPROLOL SUCCINATE 25 MG/1
25 TABLET, EXTENDED RELEASE ORAL DAILY
Status: DISCONTINUED | OUTPATIENT
Start: 2022-08-26 | End: 2022-08-29 | Stop reason: HOSPADM

## 2022-08-26 RX ORDER — DOXAZOSIN 2 MG/1
2 TABLET ORAL DAILY
Status: DISCONTINUED | OUTPATIENT
Start: 2022-08-26 | End: 2022-08-29 | Stop reason: HOSPADM

## 2022-08-26 RX ORDER — FERROUS GLUCONATE 324(38)MG
324 TABLET ORAL
Status: DISCONTINUED | OUTPATIENT
Start: 2022-08-26 | End: 2022-08-29 | Stop reason: HOSPADM

## 2022-08-26 RX ORDER — NIFEDIPINE 30 MG/1
90 TABLET, EXTENDED RELEASE ORAL DAILY
Status: DISCONTINUED | OUTPATIENT
Start: 2022-08-26 | End: 2022-08-29 | Stop reason: HOSPADM

## 2022-08-26 RX ORDER — IBUPROFEN 200 MG
24 TABLET ORAL
Status: DISCONTINUED | OUTPATIENT
Start: 2022-08-26 | End: 2022-08-29 | Stop reason: HOSPADM

## 2022-08-26 RX ORDER — IBUPROFEN 200 MG
1 TABLET ORAL DAILY
Status: DISCONTINUED | OUTPATIENT
Start: 2022-08-26 | End: 2022-08-29 | Stop reason: HOSPADM

## 2022-08-26 RX ADMIN — OCTREOTIDE ACETATE 100 MCG: 100 INJECTION, SOLUTION INTRAVENOUS; SUBCUTANEOUS at 05:08

## 2022-08-26 RX ADMIN — OCTREOTIDE ACETATE 100 MCG: 100 INJECTION, SOLUTION INTRAVENOUS; SUBCUTANEOUS at 12:08

## 2022-08-26 RX ADMIN — ONDANSETRON 4 MG: 2 INJECTION INTRAMUSCULAR; INTRAVENOUS at 06:08

## 2022-08-26 RX ADMIN — ARFORMOTEROL TARTRATE 15 MCG: 15 SOLUTION RESPIRATORY (INHALATION) at 07:08

## 2022-08-26 RX ADMIN — INSULIN ASPART 8 UNITS: 100 INJECTION, SOLUTION INTRAVENOUS; SUBCUTANEOUS at 01:08

## 2022-08-26 RX ADMIN — PANTOPRAZOLE SODIUM 40 MG: 40 TABLET, DELAYED RELEASE ORAL at 09:08

## 2022-08-26 RX ADMIN — IPRATROPIUM BROMIDE AND ALBUTEROL SULFATE 3 ML: 2.5; .5 SOLUTION RESPIRATORY (INHALATION) at 08:08

## 2022-08-26 RX ADMIN — TAMSULOSIN HYDROCHLORIDE 0.4 MG: 0.4 CAPSULE ORAL at 09:08

## 2022-08-26 RX ADMIN — FERROUS GLUCONATE TAB 324 MG (37.5 MG ELEMENTAL IRON) 324 MG: 324 (37.5 FE) TAB at 09:08

## 2022-08-26 RX ADMIN — BUDESONIDE 0.5 MG: 0.5 INHALANT RESPIRATORY (INHALATION) at 07:08

## 2022-08-26 RX ADMIN — METOPROLOL SUCCINATE 25 MG: 25 TABLET, EXTENDED RELEASE ORAL at 09:08

## 2022-08-26 RX ADMIN — MUPIROCIN: 20 OINTMENT TOPICAL at 09:08

## 2022-08-26 RX ADMIN — DULOXETINE 30 MG: 30 CAPSULE, DELAYED RELEASE ORAL at 09:08

## 2022-08-26 RX ADMIN — IPRATROPIUM BROMIDE AND ALBUTEROL SULFATE 3 ML: 2.5; .5 SOLUTION RESPIRATORY (INHALATION) at 07:08

## 2022-08-26 RX ADMIN — QUETIAPINE FUMARATE 50 MG: 50 TABLET ORAL at 08:08

## 2022-08-26 RX ADMIN — DOXAZOSIN 2 MG: 2 TABLET ORAL at 09:08

## 2022-08-26 RX ADMIN — ATORVASTATIN CALCIUM 40 MG: 40 TABLET, FILM COATED ORAL at 09:08

## 2022-08-26 RX ADMIN — NICOTINE 1 PATCH: 14 PATCH, EXTENDED RELEASE TRANSDERMAL at 09:08

## 2022-08-26 RX ADMIN — POLYETHYLENE GLYCOL (3350) 17 G: 17 POWDER, FOR SOLUTION ORAL at 09:08

## 2022-08-26 RX ADMIN — IPRATROPIUM BROMIDE AND ALBUTEROL SULFATE 3 ML: 2.5; .5 SOLUTION RESPIRATORY (INHALATION) at 01:08

## 2022-08-26 RX ADMIN — LEVOTHYROXINE SODIUM 50 MCG: 0.05 TABLET ORAL at 09:08

## 2022-08-26 RX ADMIN — LACTULOSE 10 G: 20 SOLUTION ORAL at 09:08

## 2022-08-26 RX ADMIN — LACTULOSE 10 G: 20 SOLUTION ORAL at 08:08

## 2022-08-26 RX ADMIN — ASPIRIN 81 MG CHEWABLE TABLET 81 MG: 81 TABLET CHEWABLE at 09:08

## 2022-08-26 RX ADMIN — INSULIN ASPART 6 UNITS: 100 INJECTION, SOLUTION INTRAVENOUS; SUBCUTANEOUS at 03:08

## 2022-08-26 RX ADMIN — NIFEDIPINE 90 MG: 30 TABLET, FILM COATED, EXTENDED RELEASE ORAL at 09:08

## 2022-08-26 RX ADMIN — SITAGLIPTIN 100 MG: 100 TABLET, FILM COATED ORAL at 09:08

## 2022-08-26 RX ADMIN — MUPIROCIN: 20 OINTMENT TOPICAL at 08:08

## 2022-08-26 NOTE — PLAN OF CARE
[place] - Intensive Care  Initial Discharge Assessment       Primary Care Provider: Jacob Zuniga MD    Admission Diagnosis: Syncope [R55]  Hypoglycemia [E16.2]  Renal insufficiency [N28.9]  Anemia, unspecified type [D64.9]    Admission Date: 8/25/2022  Expected Discharge Date:     Discharge Barriers Identified: None    Payor: [employer] / Plan: [employer] / Product Type: Medicare Advantage /     Extended Emergency Contact Information  Primary Emergency Contact: olivia conroy  Mobile Phone: [phone]  Relation: Son  Secondary Emergency Contact: annabella conroy  Mobile Phone: [phone]  Relation: Son    Discharge Plan A: Skilled Nursing Facility, Return to nursing home  Discharge Plan B: Return to Nursing Home      [id number] - [place] - [address] AT [facility]  [address]  [place] [address]  Phone: [phone] Fax: [phone]      Initial Assessment (most recent)       Adult Discharge Assessment - 08/26/22 1451          Discharge Assessment    Assessment Type Discharge Planning Assessment     Source of Information facility verbal report   Spoke to the patient's nurse Eula.    Reason For Admission Hypoglycemia     Lives With facility resident     Do you expect to return to your current living situation? Yes     Do you have help at home or someone to help you manage your care at home? Yes     Who are your caregiver(s) and their phone number(s)? Nursing Staff at [facility]-[phone]     Current cognitive status: Unable to Assess     Walking or Climbing Stairs Difficulty ambulation difficulty, requires equipment;ambulation difficulty, assistance 1 person;transferring difficulty, assistance 1 person;transferring difficulty, requires equipment     Mobility Management wheelchair     Dressing/Bathing Difficulty bathing difficulty, requires equipment;bathing difficulty, assistance 1  person;dressing difficulty, assistance 1 person     Home Accessibility wheelchair accessible     Home Layout Able to live on 1st floor     Equipment Currently Used at Home wheelchair;glucometer;shower chair;nebulizer   The patient is on a breathing treatment as needed. An he has an inhaler.    How do you get to doctors appointments? other (see comments)     Are you on dialysis? No     Do you take coumadin? No     Discharge Plan A Skilled Nursing Facility;Return to nursing home     Discharge Plan B Return to Nursing Home     DME Needed Upon Discharge  other (see comments)   TBD    Discharge Barriers Identified None                 Initial discharge assessment is completed. Attempted to contact the patient via phone, but the call went straight to voicemail. I was able to obtain information from the patient's nurse, Eula, at [facility]. The patient utilize a wheelchair at the facility. Eula also stated that he gets breathing treatments as needed, and he has an inhaler as well. The plan is for the patient to return back to the facility. Will follow-up with the patient once he is out of isolation regarding his discharge plan of care.     Will continue to monitor.        Addendum:8/28-Spoke to the patient this morning, he verbalized that he will be returning back to [facility] and [facility].

## 2022-08-26 NOTE — PT/OT/SLP EVAL
"Physical Therapy Evaluation    Patient Name:  Pam Gray   MRN:  [id number]    Recommendations:     Discharge Recommendations:  nursing facility, skilled (TBD)   Discharge Equipment Recommendations: none   Barriers to discharge: medical stability    Assessment:     Pam Gray is a 62 y.o. male admitted with a medical diagnosis of Hypoglycemia.  He presents with the following impairments/functional limitations:  weakness, impaired endurance, gait instability, impaired balance impairing functional independence. Pt performing bed mobility with min-mod A. Patient performing sit>stand transfer using RW with Mod A of 1 person and min A of 2nd person. Patient performs side steps along bedside with RW and min A.     Rehab Prognosis: Good; patient would benefit from acute skilled PT services to address these deficits and reach maximum level of function.    Recent Surgery: * No surgery found *      Plan:     During this hospitalization, patient to be seen 5 x/week to address the identified rehab impairments via gait training, therapeutic activities, therapeutic exercises and progress toward the following goals:    · Plan of Care Expires:  09/09/22    Subjective     Chief Complaint: back pain  Patient/Family Comments/goals: "it hurts to sit up"  Pain/Comfort:  · Pain Rating 1: other (see comments) (not rated)  · Location 1: back  · Pain Addressed 1: Reposition, Cessation of Activity    Patients cultural, spiritual, Jain conflicts given the current situation:      Living Environment:  Patient living at SNF prior to admission.  Prior to admission, patients level of function was requiring assistance.  Equipment used at home: wheelchair, walker, rolling.  DME owned (not currently used): none.  Upon discharge, patient will have assistance from TBD.    Objective:     Communicated with RN prior to session.  Patient found HOB elevated with telemetry, pulse ox (continuous), blood pressure cuff, peripheral IV  " upon PT entry to room.    General Precautions: Standard, fall, contact, droplet, airborne   Orthopedic Precautions:N/A   Braces: N/A  Respiratory Status: Room air    Exams:  · Cognitive Exam:  Patient is oriented to Person, Place and Situation  · Sensation:    · -       Intact  · RLE ROM: WFL  · RLE Strength: Deficits: grossly impaired  · LLE ROM: WFL  · LLE Strength: Deficits: grossly impaired    Functional Mobility:  · Bed Mobility:     · Rolling Right: minimum assistance  · Scooting: moderate assistance and to EOB  · Supine to Sit: moderate assistance  · Sit to Supine: stand by assistance  · Transfers:     · Sit to Stand:  minimum assistance, moderate assistance and of 2 persons with rolling walker  · Gait: PAtient takes several side stpes along bedside with min A and RW  · Balance: PAtient with fair- standing balance requiring CGA/min A for static standing with RW    Therapeutic Activities and Exercises:   Not Performed    AM-PAC 6 CLICK MOBILITY  Total Score:12     Patient left HOB elevated with all lines intact, call button in reach and RN notified.    GOALS:   Multidisciplinary Problems     Physical Therapy Goals        Problem: Physical Therapy    Goal Priority Disciplines Outcome Goal Variances Interventions   Physical Therapy Goal     PT, PT/OT      Description: Goals to be met by: 22     Patient will increase functional independence with mobility by performin. Supine to sit with Contact Guard Assistance  2. Sit to stand transfer with Contact Guard Assistance  3. Bed to chair transfer with Contact Guard Assistance using Rolling Walker  4. Gait  x 25 feet with Contact Guard Assistance using Rolling Walker.                      History:     Past Medical History:   Diagnosis Date    Anxiety     Arthritis     CHF (congestive heart failure)     Chronic kidney disease     COPD (chronic obstructive pulmonary disease)     Coronary artery disease     Diabetes mellitus     Erectile dysfunction      Hypertension     Necrotizing fasciitis of forearm     Peripheral neuropathy     Stroke     TIA    Thyroid disease        Past Surgical History:   Procedure Laterality Date    arm surgery Right     arthroscopy Right     knee    COLONOSCOPY N/A 7/3/2019    Procedure: COLONOSCOPY;  Surgeon: Jagdish Pollock MD;  Location: [facility];  Service: Endoscopy;  Laterality: N/A;    ESOPHAGOGASTRODUODENOSCOPY N/A 7/3/2019    Procedure: ESOPHAGOGASTRODUODENOSCOPY (EGD);  Surgeon: Jagdish Pollock MD;  Location: [facility] END[facility];  Service: Endoscopy;  Laterality: N/A;    EYE SURGERY      FUSION, SPINE, POSTERIOR APPROACH N/A 7/29/2022    Procedure: FUSION,SPINE,POSTERIOR APPROACH, T9-L3;  Surgeon: Luis E Lawson MD;  Location: [facility] OR;  Service: Orthopedics;  Laterality: N/A;    ROTATOR CUFF REPAIR Right     TONSILLECTOMY      VERTEBRAL CORPECTOMY N/A 7/27/2022    Procedure: CORPECTOMY T12;  Surgeon: Luis E Lawson MD;  Location: [facility] OR;  Service: Orthopedics;  Laterality: N/A;       Time Tracking:     PT Received On: 08/26/22  PT Start Time: 1130     PT Stop Time: 1200  PT Total Time (min): 30 min     Billable Minutes: Evaluation 30 minutes      08/26/2022

## 2022-08-26 NOTE — H&P
[facility]  History & Physical    Patient Name: Pam Gray  MRN: [id number]  Patient Class: IP- Inpatient  Admission Date: 8/25/2022  Attending Physician: Bhanu Davila III, MD  Primary Care Provider: Jacob Zuniga MD         Patient information was obtained from patient, past medical records and ER records.     Subjective:     Principal Problem:Hypoglycemia    Chief Complaint:   Chief Complaint   Patient presents with    Hypoglycemia     Pt was seen in ED this morning. Was discharged and states he was not given anything to eat once back at nursing home. Nursing home reports low cbg. EMS reports cbg 36. 20G to left hand with D5W running.           HPI: Patient is a 62-year-old male with a history of CVA dementia hypertension hyperlipidemia diabetes necrotizing fasciitis of the arm chronic kidney disease who was recently admitted to a local nursing home facility post back surgery.  The patient states he had back surgery several weeks ago and is in rehabilitative care.  Patient states he has been doing well and he began having drops in his blood sugar and was brought into our local ER.  He was initially discharged and then returned with blood sugars in the low 20s.  The patient has a history of CKD stage 3 to for and is currently on Amaryl and multiple other hypoglycemics.  Patient was admitted started on a D10 drip his blood sugars are now 3-400 and he is awake and eating breakfast this morning.  The patient has polypharmacy we reviewed his medications he does have some dementia.      Past Medical History:   Diagnosis Date    Anxiety     Arthritis     CHF (congestive heart failure)     Chronic kidney disease     COPD (chronic obstructive pulmonary disease)     Coronary artery disease     Diabetes mellitus     Erectile dysfunction     Hypertension     Necrotizing fasciitis of forearm     Peripheral neuropathy     Stroke     TIA    Thyroid disease         Past Surgical History:   Procedure Laterality Date    arm surgery Right     arthroscopy Right     knee    COLONOSCOPY N/A 7/3/2019    Procedure: COLONOSCOPY;  Surgeon: Jagdish Pollock MD;  Location: [facility] ENDO;  Service: Endoscopy;  Laterality: N/A;    ESOPHAGOGASTRODUODENOSCOPY N/A 7/3/2019    Procedure: ESOPHAGOGASTRODUODENOSCOPY (EGD);  Surgeon: Jagdish Pollock MD;  Location: [facility] ENDO;  Service: Endoscopy;  Laterality: N/A;    EYE SURGERY      FUSION, SPINE, POSTERIOR APPROACH N/A 7/29/2022    Procedure: FUSION,SPINE,POSTERIOR APPROACH, T9-L3;  Surgeon: Luis E Lawson MD;  Location: [facility] OR;  Service: Orthopedics;  Laterality: N/A;    ROTATOR CUFF REPAIR Right     TONSILLECTOMY      VERTEBRAL CORPECTOMY N/A 7/27/2022    Procedure: CORPECTOMY T12;  Surgeon: Luis E Lawson MD;  Location: [facility] OR;  Service: Orthopedics;  Laterality: N/A;       Review of patient's allergies indicates:   Allergen Reactions    Onion     Pork/porcine containing products     Shellfish containing products     Shrimp        No current facility-administered medications on file prior to encounter.     Current Outpatient Medications on File Prior to Encounter   Medication Sig    albuterol-ipratropium (DUO-NEB) 2.5 mg-0.5 mg/3 mL nebulizer solution Take 3 mLs by nebulization every 6 (six) hours while awake. Rescue    aspirin 81 MG Chew Take 1 tablet (81 mg total) by mouth once daily.    atorvastatin (LIPITOR) 40 MG tablet Take 1 tablet (40 mg total) by mouth once daily.    clonazePAM (KLONOPIN) 0.5 MG tablet Take 1 tablet (0.5 mg total) by mouth 3 (three) times daily. (Patient taking differently: Take 0.5 mg by mouth 2 (two) times daily.)    doxazosin (CARDURA) 2 MG tablet Take 1 tablet (2 mg total) by mouth once daily.    DULoxetine (CYMBALTA) 30 MG capsule Take 1 capsule (30 mg total) by mouth 2 (two) times daily.    ferrous gluconate (FERGON) 324 MG tablet Take 1 tablet (324 mg total) by mouth daily  with breakfast.    fluticasone furoate-vilanteroL (BREO) 100-25 mcg/dose diskus inhaler Inhale 1 puff into the lungs once daily. Controller    glimepiride (AMARYL) 4 MG tablet Take 1 tablet (4 mg total) by mouth before breakfast.    guaiFENesin (MUCINEX) 600 mg 12 hr tablet Take 1 tablet (600 mg total) by mouth 2 (two) times daily. for 10 days    lactulose (CEPHULAC) 10 gram packet Take 1 packet (10 g total) by mouth 2 (two) times daily.    metoprolol succinate (TOPROL-XL) 25 MG 24 hr tablet Take 1 tablet (25 mg total) by mouth once daily.    nicotine (NICODERM CQ) 14 mg/24 hr Place 1 patch onto the skin once daily.    NIFEdipine (PROCARDIA-XL) 90 MG (OSM) 24 hr tablet Take 1 tablet (90 mg total) by mouth once daily.    pantoprazole (PROTONIX) 40 MG tablet Take 1 tablet (40 mg total) by mouth once daily.    polyethylene glycol (GLYCOLAX) 17 gram PwPk Take 17 g by mouth once daily.    QUEtiapine (SEROQUEL) 50 MG tablet Take 1 tablet (50 mg total) by mouth 2 (two) times daily.    senna-docusate 8.6-50 mg (PERICOLACE) 8.6-50 mg per tablet Take 1 tablet by mouth once daily.    tiotropium bromide (SPIRIVA RESPIMAT) 2.5 mcg/actuation inhaler Inhale 2 puffs into the lungs once daily. Controller    [DISCONTINUED] albuterol (PROVENTIL) 2.5 mg /3 mL (0.083 %) nebulizer solution USE 1 VIAL VIA NEBULIZER EVERY 6 HOURS AS NEEDED 7/9/21    [DISCONTINUED] amLODIPine (NORVASC) 10 MG tablet Take 1 tablet (10 mg total) by mouth once daily.    [DISCONTINUED] furosemide (LASIX) 80 MG tablet Take 1 tablet (80 mg total) by mouth 2 (two) times daily.    [DISCONTINUED] isosorbide-hydrALAZINE 20-37.5 mg (BIDIL) 20-37.5 mg Tab Take 1 tablet by mouth 2 (two) times daily.    [DISCONTINUED] levothyroxine (SYNTHROID) 50 MCG tablet Take 50 mcg by mouth once daily.    [DISCONTINUED] metoprolol tartrate (LOPRESSOR) 50 MG tablet Take 1 tablet (50 mg total) by mouth 2 (two) times daily.    [DISCONTINUED] mirtazapine (REMERON) 30 MG  tablet Take 30 mg by mouth every evening.    [DISCONTINUED] omeprazole (PRILOSEC) 40 MG capsule Take 1 capsule (40 mg total) by mouth once daily.    [DISCONTINUED] pioglitazone (ACTOS) 30 MG tablet Take 30 mg by mouth once daily.    [DISCONTINUED] rivastigmine (EXELON) 4.6 mg/24 hour PT24 Place 1 patch onto the skin once daily.    [DISCONTINUED] SITagliptin (JANUVIA) 100 MG Tab Take 100 mg by mouth once daily.    [DISCONTINUED] tamsulosin (FLOMAX) 0.4 mg Cap Take 0.4 mg by mouth once daily.    [DISCONTINUED] traZODone (DESYREL) 50 MG tablet Take 50 mg by mouth every evening.     Family History       Problem Relation (Age of Onset)    COPD Mother    Cancer Brother    Diabetes Father          Tobacco Use    Smoking status: Current Every Day Smoker     Packs/day: 1.00     Years: 40.00     Pack years: 40.00     Types: Cigarettes    Smokeless tobacco: Never Used   Substance and Sexual Activity    Alcohol use: Not Currently    Drug use: Not Currently    Sexual activity: Not on file     Review of Systems   Constitutional:  Positive for activity change, appetite change and fatigue. Negative for chills and fever.   HENT:  Negative for ear pain, mouth sores, nosebleeds and sore throat.    Eyes:  Negative for visual disturbance.   Respiratory:  Negative for cough, shortness of breath and wheezing.    Cardiovascular:  Negative for chest pain, palpitations and leg swelling.   Gastrointestinal:  Positive for constipation. Negative for abdominal distention, abdominal pain, blood in stool, diarrhea, nausea and vomiting.   Endocrine: Negative for polyphagia.   Genitourinary:  Positive for difficulty urinating. Negative for dysuria, flank pain and frequency.   Musculoskeletal:  Positive for back pain. Negative for arthralgias.   Skin:  Negative for rash.   Neurological:  Positive for weakness. Negative for dizziness, tremors, seizures, syncope, facial asymmetry, speech difficulty and headaches.   Hematological:  Negative  for adenopathy.   Psychiatric/Behavioral:  Positive for confusion and sleep disturbance. Negative for agitation and hallucinations. The patient is nervous/anxious.    Objective:     Vital Signs (Most Recent):  Temp: 97.5 °F (36.4 °C) (08/26/22 0703)  Pulse: 74 (08/26/22 0800)  Resp: (!) 26 (08/26/22 0800)  BP: (!) 161/70 (08/26/22 0730)  SpO2: (!) 94 % (08/26/22 0800)   Vital Signs (24h Range):  Temp:  [97.1 °F (36.2 °C)-97.9 °F (36.6 °C)] 97.5 °F (36.4 °C)  Pulse:  [57-74] 74  Resp:  [10-28] 26  SpO2:  [93 %-100 %] 94 %  BP: (107-161)/(54-77) 161/70     Weight: 91.2 kg (201 lb)  Body mass index is 29.68 kg/m².    Physical Exam  Vitals and nursing note reviewed.   Constitutional:       General: He is not in acute distress.     Appearance: He is obese. He is ill-appearing.   HENT:      Head: Normocephalic and atraumatic.      Right Ear: External ear normal.      Left Ear: External ear normal.      Nose: Nose normal. No congestion or rhinorrhea.      Mouth/Throat:      Mouth: Mucous membranes are moist.      Pharynx: No oropharyngeal exudate.   Eyes:      General: No scleral icterus.     Extraocular Movements: Extraocular movements intact.   Neck:      Vascular: No carotid bruit.   Cardiovascular:      Rate and Rhythm: Normal rate and regular rhythm.      Heart sounds: Normal heart sounds. No murmur heard.    No friction rub. No gallop.   Pulmonary:      Effort: Pulmonary effort is normal. No respiratory distress.      Breath sounds: Normal breath sounds. No stridor. No wheezing, rhonchi or rales.   Chest:      Chest wall: No tenderness.   Abdominal:      General: Bowel sounds are normal. There is no distension.      Palpations: Abdomen is soft. There is no mass.      Tenderness: There is no abdominal tenderness. There is no right CVA tenderness, left CVA tenderness, guarding or rebound.      Hernia: No hernia is present.   Musculoskeletal:         General: No swelling, tenderness or deformity.      Cervical back:  Neck supple. No rigidity.      Right lower leg: No edema.      Left lower leg: No edema.   Lymphadenopathy:      Cervical: No cervical adenopathy.   Skin:     Capillary Refill: Capillary refill takes less than 2 seconds.      Coloration: Skin is not jaundiced or pale.      Findings: No rash.   Neurological:      Mental Status: He is alert. Mental status is at baseline.      Sensory: No sensory deficit.      Motor: Weakness present.      Coordination: Coordination normal.   Psychiatric:         Mood and Affect: Mood normal.         Behavior: Behavior normal.           Significant Labs: All pertinent labs within the past 24 hours have been reviewed.    Significant Imaging: I have reviewed all pertinent imaging results/findings within the past 24 hours.    Assessment/Plan:     * Hypoglycemia  Likely medication and polypharmacy induced.  Stopping sul furea indefinitely and changing agents based on his renal disease.      MDD (major depressive disorder), recurrent episode, moderate          Benzodiazepine + opiate dependence        Burst fracture of T12 vertebra        Chronic HFrEF (heart failure with reduced ejection fraction) ICM NYHA2 EF 30%        Transaminasemia        Vascular dementia with delirium        Decompensated cirrhosis of liver with hepatic encephalopathy without ascites        Hepatic encephalopathy        CAD (coronary artery disease)        Class 1 obesity due to excess calories with serious comorbidity and body mass index (BMI) of 32.0 to 32.9 in adult  Body mass index is 29.68 kg/m². Morbid obesity complicates all aspects of disease management from diagnostic modalities to treatment. Weight loss encouraged and health benefits explained to patient.         CKD (chronic kidney disease), stage IV          VTE Risk Mitigation (From admission, onward)         Ordered     IP VTE HIGH RISK PATIENT  Once         08/25/22 2037     Place sequential compression device  Until discontinued         08/25/22 2037                    Bhanu Davila III, MD  Department of [facility]   [place] - Intensive Care

## 2022-08-26 NOTE — EICU
New Patient Evaluation    HPI:  62 M smoker, history of DM, hypertension, CKD, Burst fracture of T12 s/p corpectomy with spinal fusion last July, recently tested positive for COVID, brought in from rehab facility due to altered sensorium. Found to be hypoglycemic. Reportedly with decreased PO intake. Started on D5 which is now off.    Camera Assessment:  /54  HR 68  O2 96%  Seen asleep and appears comfortable    Data:  WBC 12.62, H/H 10.4/33.5, platelets 175  Na 140, K 3.7, Cl 106, CO2 26, BUN 37, creatinine 3.3  CXR no acute findings    Assessment and Plans:  Encephalopathy from hypoglycemia likely a combination of poor intake due to recent illness with worsening renal function from dehydration and building up of oral hypoglycemic medications. Continue close glucose monitoring for now.

## 2022-08-26 NOTE — CARE UPDATE
[facility] called concerning pt;s rivastigmine patch - dr melgar iii would like pt to continue to patch and not changed to po - spoke with arnold at nursing [place] and she will speak with nurse manager about getting the patches to icu

## 2022-08-26 NOTE — HPI
Patient is a 62-year-old male with a history of CVA dementia hypertension hyperlipidemia diabetes necrotizing fasciitis of the arm chronic kidney disease who was recently admitted to a local nursing home facility post back surgery.  The patient states he had back surgery several weeks ago and is in rehabilitative care.  Patient states he has been doing well and he began having drops in his blood sugar and was brought into our local ER.  He was initially discharged and then returned with blood sugars in the low 20s.  The patient has a history of CKD stage 3 to for and is currently on Amaryl and multiple other hypoglycemics.  Patient was admitted started on a D10 drip his blood sugars are now 3-400 and he is awake and eating breakfast this morning.  The patient has polypharmacy we reviewed his medications he does have some dementia.

## 2022-08-26 NOTE — ASSESSMENT & PLAN NOTE
Body mass index is 29.68 kg/m². Morbid obesity complicates all aspects of disease management from diagnostic modalities to treatment. Weight loss encouraged and health benefits explained to patient.

## 2022-08-26 NOTE — PLAN OF CARE
No issues overnight, rested well with no c/o voiced. VSS, afebrile. CBG's Q2 hr have maintained above 300 this shift. D10 continues to infuse at 10ml/hr. Voids to urinal without difficulty, no BM noted.

## 2022-08-26 NOTE — PLAN OF CARE
Pt awake and alert - flat affect - denies pain or distress - room air - no resp distress noted or voiced - sb to sr - pulses intact 2+ lower extremity edema - abdomen soft non tender - diaper in place - voids per urine clear yellow urine d10 dc'd this am - blood sugars ac and hs with sliding scale coverage - refusing bath and diaper change at present - no bm noted - isolation will be complete tomorrow per infection control nurse (zyiad) - pt will then transfer to clean room in icu - no elevated temp - will continue to monitor

## 2022-08-26 NOTE — EICU
Rounding (Video Assessment):  Yes    Comments: Video assessment completed. Pt lying in bed with eyes closed.  Resp even and unlabored.  NAD noted.

## 2022-08-26 NOTE — SUBJECTIVE & OBJECTIVE
Past Medical History:   Diagnosis Date    Anxiety     Arthritis     CHF (congestive heart failure)     Chronic kidney disease     COPD (chronic obstructive pulmonary disease)     Coronary artery disease     Diabetes mellitus     Erectile dysfunction     Hypertension     Necrotizing fasciitis of forearm     Peripheral neuropathy     Stroke     TIA    Thyroid disease        Past Surgical History:   Procedure Laterality Date    arm surgery Right     arthroscopy Right     knee    COLONOSCOPY N/A 7/3/2019    Procedure: COLONOSCOPY;  Surgeon: Jagdish Pollock MD;  Location: [facility] ENDO;  Service: Endoscopy;  Laterality: N/A;    ESOPHAGOGASTRODUODENOSCOPY N/A 7/3/2019    Procedure: ESOPHAGOGASTRODUODENOSCOPY (EGD);  Surgeon: Jagdish Pollock MD;  Location: [facility] ENDO;  Service: Endoscopy;  Laterality: N/A;    EYE SURGERY      FUSION, SPINE, POSTERIOR APPROACH N/A 7/29/2022    Procedure: FUSION,SPINE,POSTERIOR APPROACH, T9-L3;  Surgeon: Luis E Lawson MD;  Location: [facility] OR;  Service: Orthopedics;  Laterality: N/A;    ROTATOR CUFF REPAIR Right     TONSILLECTOMY      VERTEBRAL CORPECTOMY N/A 7/27/2022    Procedure: CORPECTOMY T12;  Surgeon: Luis E Lawson MD;  Location: [facility] OR;  Service: Orthopedics;  Laterality: N/A;       Review of patient's allergies indicates:   Allergen Reactions    Onion     Pork/porcine containing products     Shellfish containing products     Shrimp        No current facility-administered medications on file prior to encounter.     Current Outpatient Medications on File Prior to Encounter   Medication Sig    albuterol-ipratropium (DUO-NEB) 2.5 mg-0.5 mg/3 mL nebulizer solution Take 3 mLs by nebulization every 6 (six) hours while awake. Rescue    aspirin 81 MG Chew Take 1 tablet (81 mg total) by mouth once daily.    atorvastatin (LIPITOR) 40 MG tablet Take 1 tablet (40 mg total) by mouth once daily.    clonazePAM (KLONOPIN) 0.5 MG tablet Take 1 tablet (0.5 mg total) by mouth 3 (three) times  daily. (Patient taking differently: Take 0.5 mg by mouth 2 (two) times daily.)    doxazosin (CARDURA) 2 MG tablet Take 1 tablet (2 mg total) by mouth once daily.    DULoxetine (CYMBALTA) 30 MG capsule Take 1 capsule (30 mg total) by mouth 2 (two) times daily.    ferrous gluconate (FERGON) 324 MG tablet Take 1 tablet (324 mg total) by mouth daily with breakfast.    fluticasone furoate-vilanteroL (BREO) 100-25 mcg/dose diskus inhaler Inhale 1 puff into the lungs once daily. Controller    glimepiride (AMARYL) 4 MG tablet Take 1 tablet (4 mg total) by mouth before breakfast.    guaiFENesin (MUCINEX) 600 mg 12 hr tablet Take 1 tablet (600 mg total) by mouth 2 (two) times daily. for 10 days    lactulose (CEPHULAC) 10 gram packet Take 1 packet (10 g total) by mouth 2 (two) times daily.    metoprolol succinate (TOPROL-XL) 25 MG 24 hr tablet Take 1 tablet (25 mg total) by mouth once daily.    nicotine (NICODERM CQ) 14 mg/24 hr Place 1 patch onto the skin once daily.    NIFEdipine (PROCARDIA-XL) 90 MG (OSM) 24 hr tablet Take 1 tablet (90 mg total) by mouth once daily.    pantoprazole (PROTONIX) 40 MG tablet Take 1 tablet (40 mg total) by mouth once daily.    polyethylene glycol (GLYCOLAX) 17 gram PwPk Take 17 g by mouth once daily.    QUEtiapine (SEROQUEL) 50 MG tablet Take 1 tablet (50 mg total) by mouth 2 (two) times daily.    senna-docusate 8.6-50 mg (PERICOLACE) 8.6-50 mg per tablet Take 1 tablet by mouth once daily.    tiotropium bromide (SPIRIVA RESPIMAT) 2.5 mcg/actuation inhaler Inhale 2 puffs into the lungs once daily. Controller    [DISCONTINUED] albuterol (PROVENTIL) 2.5 mg /3 mL (0.083 %) nebulizer solution USE 1 VIAL VIA NEBULIZER EVERY 6 HOURS AS NEEDED 7/9/21    [DISCONTINUED] amLODIPine (NORVASC) 10 MG tablet Take 1 tablet (10 mg total) by mouth once daily.    [DISCONTINUED] furosemide (LASIX) 80 MG tablet Take 1 tablet (80 mg total) by mouth 2 (two) times daily.    [DISCONTINUED] isosorbide-hydrALAZINE  20-37.5 mg (BIDIL) 20-37.5 mg Tab Take 1 tablet by mouth 2 (two) times daily.    [DISCONTINUED] levothyroxine (SYNTHROID) 50 MCG tablet Take 50 mcg by mouth once daily.    [DISCONTINUED] metoprolol tartrate (LOPRESSOR) 50 MG tablet Take 1 tablet (50 mg total) by mouth 2 (two) times daily.    [DISCONTINUED] mirtazapine (REMERON) 30 MG tablet Take 30 mg by mouth every evening.    [DISCONTINUED] omeprazole (PRILOSEC) 40 MG capsule Take 1 capsule (40 mg total) by mouth once daily.    [DISCONTINUED] pioglitazone (ACTOS) 30 MG tablet Take 30 mg by mouth once daily.    [DISCONTINUED] rivastigmine (EXELON) 4.6 mg/24 hour PT24 Place 1 patch onto the skin once daily.    [DISCONTINUED] SITagliptin (JANUVIA) 100 MG Tab Take 100 mg by mouth once daily.    [DISCONTINUED] tamsulosin (FLOMAX) 0.4 mg Cap Take 0.4 mg by mouth once daily.    [DISCONTINUED] traZODone (DESYREL) 50 MG tablet Take 50 mg by mouth every evening.     Family History       Problem Relation (Age of Onset)    COPD Mother    Cancer Brother    Diabetes Father          Tobacco Use    Smoking status: Current Every Day Smoker     Packs/day: 1.00     Years: 40.00     Pack years: 40.00     Types: Cigarettes    Smokeless tobacco: Never Used   Substance and Sexual Activity    Alcohol use: Not Currently    Drug use: Not Currently    Sexual activity: Not on file     Review of Systems   Constitutional:  Positive for activity change, appetite change and fatigue. Negative for chills and fever.   HENT:  Negative for ear pain, mouth sores, nosebleeds and sore throat.    Eyes:  Negative for visual disturbance.   Respiratory:  Negative for cough, shortness of breath and wheezing.    Cardiovascular:  Negative for chest pain, palpitations and leg swelling.   Gastrointestinal:  Positive for constipation. Negative for abdominal distention, abdominal pain, blood in stool, diarrhea, nausea and vomiting.   Endocrine: Negative for polyphagia.   Genitourinary:  Positive for difficulty  urinating. Negative for dysuria, flank pain and frequency.   Musculoskeletal:  Positive for back pain. Negative for arthralgias.   Skin:  Negative for rash.   Neurological:  Positive for weakness. Negative for dizziness, tremors, seizures, syncope, facial asymmetry, speech difficulty and headaches.   Hematological:  Negative for adenopathy.   Psychiatric/Behavioral:  Positive for confusion and sleep disturbance. Negative for agitation and hallucinations. The patient is nervous/anxious.    Objective:     Vital Signs (Most Recent):  Temp: 97.5 °F (36.4 °C) (08/26/22 0703)  Pulse: 74 (08/26/22 0800)  Resp: (!) 26 (08/26/22 0800)  BP: (!) 161/70 (08/26/22 0730)  SpO2: (!) 94 % (08/26/22 0800)   Vital Signs (24h Range):  Temp:  [97.1 °F (36.2 °C)-97.9 °F (36.6 °C)] 97.5 °F (36.4 °C)  Pulse:  [57-74] 74  Resp:  [10-28] 26  SpO2:  [93 %-100 %] 94 %  BP: (107-161)/(54-77) 161/70     Weight: 91.2 kg (201 lb)  Body mass index is 29.68 kg/m².    Physical Exam  Vitals and nursing note reviewed.   Constitutional:       General: He is not in acute distress.     Appearance: He is obese. He is ill-appearing.   HENT:      Head: Normocephalic and atraumatic.      Right Ear: External ear normal.      Left Ear: External ear normal.      Nose: Nose normal. No congestion or rhinorrhea.      Mouth/Throat:      Mouth: Mucous membranes are moist.      Pharynx: No oropharyngeal exudate.   Eyes:      General: No scleral icterus.     Extraocular Movements: Extraocular movements intact.   Neck:      Vascular: No carotid bruit.   Cardiovascular:      Rate and Rhythm: Normal rate and regular rhythm.      Heart sounds: Normal heart sounds. No murmur heard.    No friction rub. No gallop.   Pulmonary:      Effort: Pulmonary effort is normal. No respiratory distress.      Breath sounds: Normal breath sounds. No stridor. No wheezing, rhonchi or rales.   Chest:      Chest wall: No tenderness.   Abdominal:      General: Bowel sounds are normal. There is  no distension.      Palpations: Abdomen is soft. There is no mass.      Tenderness: There is no abdominal tenderness. There is no right CVA tenderness, left CVA tenderness, guarding or rebound.      Hernia: No hernia is present.   Musculoskeletal:         General: No swelling, tenderness or deformity.      Cervical back: Neck supple. No rigidity.      Right lower leg: No edema.      Left lower leg: No edema.   Lymphadenopathy:      Cervical: No cervical adenopathy.   Skin:     Capillary Refill: Capillary refill takes less than 2 seconds.      Coloration: Skin is not jaundiced or pale.      Findings: No rash.   Neurological:      Mental Status: He is alert. Mental status is at baseline.      Sensory: No sensory deficit.      Motor: Weakness present.      Coordination: Coordination normal.   Psychiatric:         Mood and Affect: Mood normal.         Behavior: Behavior normal.           Significant Labs: All pertinent labs within the past 24 hours have been reviewed.    Significant Imaging: I have reviewed all pertinent imaging results/findings within the past 24 hours.

## 2022-08-26 NOTE — ASSESSMENT & PLAN NOTE
Likely medication and polypharmacy induced.  Stopping sul furea indefinitely and changing agents based on his renal disease.

## 2022-08-26 NOTE — PT/OT/SLP EVAL
Occupational Therapy   Evaluation    Name: Pam Gray  MRN: [id number]  Admitting Diagnosis:  Hypoglycemia  Recent Surgery: * No surgery found *      Recommendations:     Discharge Recommendations: other (see comments) (TBD)  Discharge Equipment Recommendations:  other (see comments) (TBD closer to discharge)  Barriers to discharge:  Other (Comment) (current medical status)    Assessment:     Pam Gray is a 62 y.o. male with a medical diagnosis of Hypoglycemia.  He presents with weakness. Performance deficits affecting function: weakness, impaired endurance, impaired self care skills, impaired functional mobility.      Rehab Prognosis: Good; patient would benefit from acute skilled OT services to address these deficits and reach maximum level of function.       Plan:     Patient to be seen 6 x/week to address the above listed problems via self-care/home management, therapeutic activities, therapeutic exercises  · Plan of Care Expires: 09/16/22  · Plan of Care Reviewed with: patient    Subjective     Chief Complaint: weakness    Occupational Profile:  Living Environment: NH  Previous level of function: assistance from NH  Equipment Used at Home:  walker, rolling, wheelchair  Assistance upon Discharge: TBD    Pain/Comfort:  · Pain Rating 1: 0/10  · Pain Rating Post-Intervention 1: 0/10    Patients cultural, spiritual, Rastafarian conflicts given the current situation:      Objective:     Communicated with: nurse prior to session.  Patient found supine with telemetry, SCD, pulse ox (continuous), peripheral IV, blood pressure cuff upon OT entry to room.    General Precautions: Standard, fall, contact, airborne, droplet   Orthopedic Precautions:N/A   Braces: N/A  Respiratory Status: Room air    Occupational Performance:    Bed Mobility:    · Patient completed Rolling/Turning to Left with  minimum assistance  · Patient completed Rolling/Turning to Right with minimum assistance  · Patient completed  Scooting/Bridging with moderate assistance  · Patient completed Supine to Sit with moderate assistance  · Patient completed Sit to Supine with moderate assistance    Functional Mobility/Transfers:  · Patient completed Sit <> Stand Transfer with moderate assistance  with  rolling walker x 2  · Functional Mobility: able to side step to head of the bed x 2    Activities of Daily Living:  · Grooming: minimum assistance    · Bathing: moderate assistance    · Upper Body Dressing: minimum assistance    · Lower Body Dressing: maximal assistance    · Toileting: minimum assistance      Cognitive/Visual Perceptual:  Cognitive/Psychosocial Skills:     -       Oriented to: Person, Place and Situation   -       Follows Commands/attention:Follows multistep  commands  -       Communication: clear/fluent    Physical Exam:  Upper Extremity Range of Motion:     -       Right Upper Extremity: WFL  -       Left Upper Extremity: WFL  Upper Extremity Strength:    -       Right Upper Extremity: WFL  -       Left Upper Extremity: WFL   Strength:    -       Right Upper Extremity: WFL  -       Left Upper Extremity: WFL    AMPAC 6 Click ADL:  AMPAC Total Score: 15    Treatment & Education:  Pt educated on role of OT and OT POC. Pt v/u.  Education:    Patient left HOB elevated with all lines intact, call button in reach and nurse notified    GOALS:   Multidisciplinary Problems     Occupational Therapy Goals        Problem: Occupational Therapy    Goal Priority Disciplines Outcome Interventions   Occupational Therapy Goal     OT, PT/OT     Description: Goals to be met by: 9/16/2022     Patient will increase functional independence with ADLs by performing:    Grooming while EOB with Set-up Assistance.  Toileting from bedside commode with Moderate Assistance for hygiene and clothing management.   Sitting at edge of bed x10 minutes with Contact Guard Assistance.  Step transfer with Minimal Assistance with RW.                     History:      Past Medical History:   Diagnosis Date    Anxiety     Arthritis     CHF (congestive heart failure)     Chronic kidney disease     COPD (chronic obstructive pulmonary disease)     Coronary artery disease     Diabetes mellitus     Erectile dysfunction     Hypertension     Necrotizing fasciitis of forearm     Peripheral neuropathy     Stroke     TIA    Thyroid disease        Past Surgical History:   Procedure Laterality Date    arm surgery Right     arthroscopy Right     knee    COLONOSCOPY N/A 7/3/2019    Procedure: COLONOSCOPY;  Surgeon: Jagdish Pollock MD;  [facility]  Service: Endoscopy;  Laterality: N/A;    ESOPHAGOGASTRODUODENOSCOPY N/A 7/3/2019    Procedure: ESOPHAGOGASTRODUODENOSCOPY (EGD);  Surgeon: Jagdish Pollock MD;  [facility];  Service: Endoscopy;  Laterality: N/A;    EYE SURGERY      FUSION, SPINE, POSTERIOR APPROACH N/A 7/29/2022    Procedure: FUSION,SPINE,POSTERIOR APPROACH, T9-L3;  Surgeon: Luis E Lawson MD;  [facility];  Service: Orthopedics;  Laterality: N/A;    ROTATOR CUFF REPAIR Right     TONSILLECTOMY      VERTEBRAL CORPECTOMY N/A 7/27/2022    Procedure: CORPECTOMY T12;  Surgeon: Luis E Lawson MD;  [facility];  Service: Orthopedics;  Laterality: N/A;       Time Tracking:     OT Date of Treatment: 08/26/22  OT Start Time: 1130  OT Stop Time: 1200  OT Total Time (min): 30 min    Billable Minutes:Evaluation 30    8/26/2022

## 2022-08-27 LAB
ALBUMIN SERPL BCP-MCNC: 2.5 G/DL (ref 3.5–5.2)
ALP SERPL-CCNC: 101 U/L (ref 55–135)
ALT SERPL W/O P-5'-P-CCNC: 19 U/L (ref 10–44)
ANION GAP SERPL CALC-SCNC: 4 MMOL/L (ref 8–16)
AST SERPL-CCNC: 24 U/L (ref 10–40)
BILIRUB SERPL-MCNC: 0.5 MG/DL (ref 0.1–1)
BUN SERPL-MCNC: 45 MG/DL (ref 8–23)
CALCIUM SERPL-MCNC: 8.5 MG/DL (ref 8.7–10.5)
CHLORIDE SERPL-SCNC: 108 MMOL/L (ref 95–110)
CO2 SERPL-SCNC: 26 MMOL/L (ref 23–29)
CREAT SERPL-MCNC: 3.6 MG/DL (ref 0.5–1.4)
ERYTHROCYTE [DISTWIDTH] IN BLOOD BY AUTOMATED COUNT: 15.6 % (ref 11.5–14.5)
EST. GFR  (NO RACE VARIABLE): 18.3 ML/MIN/1.73 M^2
GLUCOSE SERPL-MCNC: 148 MG/DL (ref 70–110)
HCT VFR BLD AUTO: 29.6 % (ref 40–54)
HGB BLD-MCNC: 9.4 G/DL (ref 14–18)
MCH RBC QN AUTO: 25.4 PG (ref 27–31)
MCHC RBC AUTO-ENTMCNC: 31.8 G/DL (ref 32–36)
MCV RBC AUTO: 80 FL (ref 82–98)
PLATELET # BLD AUTO: 150 K/UL (ref 150–450)
PMV BLD AUTO: 10.9 FL (ref 9.2–12.9)
POCT GLUCOSE: 122 MG/DL (ref 70–110)
POCT GLUCOSE: 161 MG/DL (ref 70–110)
POCT GLUCOSE: 188 MG/DL (ref 70–110)
POCT GLUCOSE: 190 MG/DL (ref 70–110)
POTASSIUM SERPL-SCNC: 4.5 MMOL/L (ref 3.5–5.1)
PROT SERPL-MCNC: 6.3 G/DL (ref 6–8.4)
RBC # BLD AUTO: 3.7 M/UL (ref 4.6–6.2)
SODIUM SERPL-SCNC: 138 MMOL/L (ref 136–145)
WBC # BLD AUTO: 5.85 K/UL (ref 3.9–12.7)

## 2022-08-27 PROCEDURE — 97530 THERAPEUTIC ACTIVITIES: CPT

## 2022-08-27 PROCEDURE — 97530 THERAPEUTIC ACTIVITIES: CPT | Mod: CQ

## 2022-08-27 PROCEDURE — 25000242 PHARM REV CODE 250 ALT 637 W/ HCPCS: Performed by: INTERNAL MEDICINE

## 2022-08-27 PROCEDURE — 80053 COMPREHEN METABOLIC PANEL: CPT | Performed by: INTERNAL MEDICINE

## 2022-08-27 PROCEDURE — 99900031 HC PATIENT EDUCATION (STAT)

## 2022-08-27 PROCEDURE — 94640 AIRWAY INHALATION TREATMENT: CPT

## 2022-08-27 PROCEDURE — 25000003 PHARM REV CODE 250: Performed by: INTERNAL MEDICINE

## 2022-08-27 PROCEDURE — 85027 COMPLETE CBC AUTOMATED: CPT | Performed by: INTERNAL MEDICINE

## 2022-08-27 PROCEDURE — S4991 NICOTINE PATCH NONLEGEND: HCPCS | Performed by: INTERNAL MEDICINE

## 2022-08-27 PROCEDURE — 97110 THERAPEUTIC EXERCISES: CPT | Mod: CQ

## 2022-08-27 PROCEDURE — 99900035 HC TECH TIME PER 15 MIN (STAT)

## 2022-08-27 PROCEDURE — 94761 N-INVAS EAR/PLS OXIMETRY MLT: CPT

## 2022-08-27 PROCEDURE — 36415 COLL VENOUS BLD VENIPUNCTURE: CPT | Performed by: INTERNAL MEDICINE

## 2022-08-27 PROCEDURE — 11000001 HC ACUTE MED/SURG PRIVATE ROOM

## 2022-08-27 RX ORDER — LEVOTHYROXINE SODIUM 50 UG/1
50 TABLET ORAL DAILY
Start: 2022-08-27 | End: 2023-07-25 | Stop reason: SDUPTHER

## 2022-08-27 RX ORDER — TAMSULOSIN HYDROCHLORIDE 0.4 MG/1
0.4 CAPSULE ORAL DAILY
Start: 2022-08-27 | End: 2023-07-25 | Stop reason: SDUPTHER

## 2022-08-27 RX ORDER — INSULIN DETEMIR 100 [IU]/ML
10 INJECTION, SOLUTION SUBCUTANEOUS NIGHTLY
Refills: 0 | Status: ON HOLD
Start: 2022-08-27 | End: 2024-02-07 | Stop reason: HOSPADM

## 2022-08-27 RX ORDER — INSULIN ASPART 100 [IU]/ML
1-10 INJECTION, SOLUTION INTRAVENOUS; SUBCUTANEOUS
Refills: 0 | Status: ON HOLD
Start: 2022-08-27 | End: 2024-02-07 | Stop reason: HOSPADM

## 2022-08-27 RX ADMIN — NICOTINE 1 PATCH: 14 PATCH, EXTENDED RELEASE TRANSDERMAL at 08:08

## 2022-08-27 RX ADMIN — SITAGLIPTIN 100 MG: 100 TABLET, FILM COATED ORAL at 08:08

## 2022-08-27 RX ADMIN — ARFORMOTEROL TARTRATE 15 MCG: 15 SOLUTION RESPIRATORY (INHALATION) at 08:08

## 2022-08-27 RX ADMIN — FERROUS GLUCONATE TAB 324 MG (37.5 MG ELEMENTAL IRON) 324 MG: 324 (37.5 FE) TAB at 08:08

## 2022-08-27 RX ADMIN — IPRATROPIUM BROMIDE AND ALBUTEROL SULFATE 3 ML: 2.5; .5 SOLUTION RESPIRATORY (INHALATION) at 07:08

## 2022-08-27 RX ADMIN — MUPIROCIN: 20 OINTMENT TOPICAL at 08:08

## 2022-08-27 RX ADMIN — INSULIN ASPART 2 UNITS: 100 INJECTION, SOLUTION INTRAVENOUS; SUBCUTANEOUS at 11:08

## 2022-08-27 RX ADMIN — CLONAZEPAM 0.5 MG: 0.5 TABLET ORAL at 09:08

## 2022-08-27 RX ADMIN — DOXAZOSIN 2 MG: 2 TABLET ORAL at 08:08

## 2022-08-27 RX ADMIN — LACTULOSE 10 G: 20 SOLUTION ORAL at 08:08

## 2022-08-27 RX ADMIN — LACTULOSE 10 G: 20 SOLUTION ORAL at 09:08

## 2022-08-27 RX ADMIN — IPRATROPIUM BROMIDE AND ALBUTEROL SULFATE 3 ML: 2.5; .5 SOLUTION RESPIRATORY (INHALATION) at 08:08

## 2022-08-27 RX ADMIN — NIFEDIPINE 90 MG: 30 TABLET, FILM COATED, EXTENDED RELEASE ORAL at 08:08

## 2022-08-27 RX ADMIN — MUPIROCIN: 20 OINTMENT TOPICAL at 09:08

## 2022-08-27 RX ADMIN — ARFORMOTEROL TARTRATE 15 MCG: 15 SOLUTION RESPIRATORY (INHALATION) at 07:08

## 2022-08-27 RX ADMIN — BUDESONIDE 0.5 MG: 0.5 INHALANT RESPIRATORY (INHALATION) at 07:08

## 2022-08-27 RX ADMIN — POLYETHYLENE GLYCOL (3350) 17 G: 17 POWDER, FOR SOLUTION ORAL at 08:08

## 2022-08-27 RX ADMIN — TAMSULOSIN HYDROCHLORIDE 0.4 MG: 0.4 CAPSULE ORAL at 08:08

## 2022-08-27 RX ADMIN — DULOXETINE 30 MG: 30 CAPSULE, DELAYED RELEASE ORAL at 08:08

## 2022-08-27 RX ADMIN — METOPROLOL SUCCINATE 25 MG: 25 TABLET, EXTENDED RELEASE ORAL at 08:08

## 2022-08-27 RX ADMIN — ASPIRIN 81 MG CHEWABLE TABLET 81 MG: 81 TABLET CHEWABLE at 08:08

## 2022-08-27 RX ADMIN — PANTOPRAZOLE SODIUM 40 MG: 40 TABLET, DELAYED RELEASE ORAL at 08:08

## 2022-08-27 RX ADMIN — BUDESONIDE 0.5 MG: 0.5 INHALANT RESPIRATORY (INHALATION) at 08:08

## 2022-08-27 RX ADMIN — QUETIAPINE FUMARATE 50 MG: 50 TABLET ORAL at 09:08

## 2022-08-27 RX ADMIN — IPRATROPIUM BROMIDE AND ALBUTEROL SULFATE 3 ML: 2.5; .5 SOLUTION RESPIRATORY (INHALATION) at 01:08

## 2022-08-27 RX ADMIN — LEVOTHYROXINE SODIUM 50 MCG: 0.05 TABLET ORAL at 08:08

## 2022-08-27 RX ADMIN — ATORVASTATIN CALCIUM 40 MG: 40 TABLET, FILM COATED ORAL at 08:08

## 2022-08-27 NOTE — EICU
Rounding (Video Assessment):  Yes    Comments: Video assessment complete. Pt sitting up in bed awake and alert. Denies complaints at this time. VSS on RA.

## 2022-08-27 NOTE — EICU
Rounding (Video Assessment):  Yes  Comments:  Video rounds completed.  Resting quietly in bed,  nad noted.

## 2022-08-27 NOTE — CARE UPDATE
Informed by [facility] in [place] that pt is able to transfer back to nursing home until authorization can be obtained on Monday - spoke with inocente in case management - she is evaluating the situation - dr song notifed and pt will be down grated to medsurg status - house supervisor notified of need of bed -

## 2022-08-27 NOTE — PT/OT/SLP PROGRESS
"Physical Therapy Treatment    Patient Name:  Pam Gray   MRN:  [id number]    Time Tracking:     PT Start Time: 1007     PT Stop Time: 1030  PT Total Time (min): 23 min     Billable Minutes: Therapeutic Activity 10 and Therapeutic Exercise 13  Trinity Brown, PTA  2022      Subjective     Patient/Family Comments/goals: Pt agreeable and willing to participate w/ PT.   Pain/Comfort: back pain w/ history of back surgery. Pt reports flexion increases his pain.       Objective:     General Precautions: Standard, fall, contact, droplet, airborne   Orthopedic Precautions:N/A     Communicated with PT, OT prior to session.  Patient found supine upon PT entry to room.     Functional Mobility:     Scooting: stand by assistance  Supine to Sit: stand by assistance  Sit to Supine: stand by assistance     Sit to Stand:  minimum assistance with 1 sided hand-held assist    Therapeutic Activities and Exercises:  ~sit<>stand x 5 reps (HR noted to rise from 85 to 117. RN notified.)   ~standing alt marches x10 reps  ~4 lateral steps w/ HHA on L bed rail for steadying. Pt very fearful at this time to attempt forward/backward gait. He expresses he "doesn't feel strong enough yet" and thinks his "legs will give out". Will cont this goal as able.   ~seated hip flexion, LAQ, & AP    Patient left supine with all lines intact, call button in reach, and RN notified.    GOALS:   Multidisciplinary Problems       Physical Therapy Goals          Problem: Physical Therapy    Goal Priority Disciplines Outcome Goal Variances Interventions   Physical Therapy Goal     PT, PT/OT      Description: Goals to be met by: 22     Patient will increase functional independence with mobility by performin. Supine to sit with Contact Guard Assistance  2. Sit to stand transfer with Contact Guard Assistance  3. Bed to chair transfer with Contact Guard Assistance using Rolling Walker  4. Gait  x 25 feet with Contact Guard Assistance using " Rolling Walker.                          Assessment:     Pam Gray is a 62 y.o. male admitted with a medical diagnosis of Hypoglycemia. Pt demonstrating great willingness to participate w/ PT.       Plan:     During this hospitalization, patient to be seen 5 x/week to address the identified rehab impairments gait training, therapeutic activities, therapeutic exercises and progress toward the goals.      Recommendations:     Cont to progress as tolerated.

## 2022-08-27 NOTE — SUBJECTIVE & OBJECTIVE
Past Medical History:   Diagnosis Date    Anxiety     Arthritis     CHF (congestive heart failure)     Chronic kidney disease     COPD (chronic obstructive pulmonary disease)     Coronary artery disease     Diabetes mellitus     Erectile dysfunction     Hypertension     Necrotizing fasciitis of forearm     Peripheral neuropathy     Stroke     TIA    Thyroid disease        Past Surgical History:   Procedure Laterality Date    arm surgery Right     arthroscopy Right     knee    COLONOSCOPY N/A 7/3/2019    Procedure: COLONOSCOPY;  Surgeon: Jagdish Pollock MD;  Location: [facility] ENDO;  Service: Endoscopy;  Laterality: N/A;    ESOPHAGOGASTRODUODENOSCOPY N/A 7/3/2019    Procedure: ESOPHAGOGASTRODUODENOSCOPY (EGD);  Surgeon: Jagdish Pollock MD;  Location: [facility] ENDO;  Service: Endoscopy;  Laterality: N/A;    EYE SURGERY      FUSION, SPINE, POSTERIOR APPROACH N/A 7/29/2022    Procedure: FUSION,SPINE,POSTERIOR APPROACH, T9-L3;  Surgeon: Luis E Lawson MD;  Location: [facility] OR;  Service: Orthopedics;  Laterality: N/A;    ROTATOR CUFF REPAIR Right     TONSILLECTOMY      VERTEBRAL CORPECTOMY N/A 7/27/2022    Procedure: CORPECTOMY T12;  Surgeon: Luis E Lawson MD;  Location: [facility] OR;  Service: Orthopedics;  Laterality: N/A;       Review of patient's allergies indicates:   Allergen Reactions    Onion     Pork/porcine containing products     Shellfish containing products     Shrimp        No current facility-administered medications on file prior to encounter.     Current Outpatient Medications on File Prior to Encounter   Medication Sig    albuterol-ipratropium (DUO-NEB) 2.5 mg-0.5 mg/3 mL nebulizer solution Take 3 mLs by nebulization every 6 (six) hours while awake. Rescue    aspirin 81 MG Chew Take 1 tablet (81 mg total) by mouth once daily.    atorvastatin (LIPITOR) 40 MG tablet Take 1 tablet (40 mg total) by mouth once daily.    clonazePAM (KLONOPIN) 0.5 MG tablet Take 1 tablet (0.5 mg  total) by mouth 3 (three) times daily. (Patient taking differently: Take 0.5 mg by mouth 2 (two) times daily.)    doxazosin (CARDURA) 2 MG tablet Take 1 tablet (2 mg total) by mouth once daily.    DULoxetine (CYMBALTA) 30 MG capsule Take 1 capsule (30 mg total) by mouth 2 (two) times daily.    ferrous gluconate (FERGON) 324 MG tablet Take 1 tablet (324 mg total) by mouth daily with breakfast.    fluticasone furoate-vilanteroL (BREO) 100-25 mcg/dose diskus inhaler Inhale 1 puff into the lungs once daily. Controller    guaiFENesin (MUCINEX) 600 mg 12 hr tablet Take 1 tablet (600 mg total) by mouth 2 (two) times daily. for 10 days    lactulose (CEPHULAC) 10 gram packet Take 1 packet (10 g total) by mouth 2 (two) times daily.    metoprolol succinate (TOPROL-XL) 25 MG 24 hr tablet Take 1 tablet (25 mg total) by mouth once daily.    nicotine (NICODERM CQ) 14 mg/24 hr Place 1 patch onto the skin once daily.    NIFEdipine (PROCARDIA-XL) 90 MG (OSM) 24 hr tablet Take 1 tablet (90 mg total) by mouth once daily.    pantoprazole (PROTONIX) 40 MG tablet Take 1 tablet (40 mg total) by mouth once daily.    polyethylene glycol (GLYCOLAX) 17 gram PwPk Take 17 g by mouth once daily.    QUEtiapine (SEROQUEL) 50 MG tablet Take 1 tablet (50 mg total) by mouth 2 (two) times daily.    senna-docusate 8.6-50 mg (PERICOLACE) 8.6-50 mg per tablet Take 1 tablet by mouth once daily.    tiotropium bromide (SPIRIVA RESPIMAT) 2.5 mcg/actuation inhaler Inhale 2 puffs into the lungs once daily. Controller    [DISCONTINUED] albuterol (PROVENTIL) 2.5 mg /3 mL (0.083 %) nebulizer solution USE 1 VIAL VIA NEBULIZER EVERY 6 HOURS AS NEEDED 7/9/21    [DISCONTINUED] amLODIPine (NORVASC) 10 MG tablet Take 1 tablet (10 mg total) by mouth once daily.    [DISCONTINUED] furosemide (LASIX) 80 MG tablet Take 1 tablet (80 mg total) by mouth 2 (two) times daily.    [DISCONTINUED] glimepiride (AMARYL) 4 MG tablet Take 1 tablet (4 mg total) by mouth  before breakfast.    [DISCONTINUED] isosorbide-hydrALAZINE 20-37.5 mg (BIDIL) 20-37.5 mg Tab Take 1 tablet by mouth 2 (two) times daily.    [DISCONTINUED] metoprolol tartrate (LOPRESSOR) 50 MG tablet Take 1 tablet (50 mg total) by mouth 2 (two) times daily.    [DISCONTINUED] mirtazapine (REMERON) 30 MG tablet Take 30 mg by mouth every evening.    [DISCONTINUED] omeprazole (PRILOSEC) 40 MG capsule Take 1 capsule (40 mg total) by mouth once daily.    [DISCONTINUED] oxyCODONE-acetaminophen (PERCOCET) 7.5-325 mg per tablet Take 1 tablet by mouth every 6 (six) hours as needed for Pain (M43.22).    [DISCONTINUED] pioglitazone (ACTOS) 30 MG tablet Take 30 mg by mouth once daily.    [DISCONTINUED] rivastigmine (EXELON) 4.6 mg/24 hour PT24 Place 1 patch onto the skin once daily.    [DISCONTINUED] SITagliptin (JANUVIA) 100 MG Tab Take 100 mg by mouth once daily.    [DISCONTINUED] traZODone (DESYREL) 50 MG tablet Take 50 mg by mouth every evening.     Family History       Problem Relation (Age of Onset)    COPD Mother    Cancer Brother    Diabetes Father          Tobacco Use    Smoking status: Every Day     Packs/day: 1.00     Years: 40.00     Pack years: 40.00     Types: Cigarettes    Smokeless tobacco: Never   Substance and Sexual Activity    Alcohol use: Not Currently    Drug use: Not Currently    Sexual activity: Not on file     Review of Systems   Constitutional:  Positive for activity change, appetite change and fatigue. Negative for chills and fever.   HENT:  Negative for ear pain, mouth sores, nosebleeds and sore throat.    Eyes:  Negative for visual disturbance.   Respiratory:  Negative for cough, shortness of breath and wheezing.    Cardiovascular:  Negative for chest pain, palpitations and leg swelling.   Gastrointestinal:  Positive for constipation. Negative for abdominal distention, abdominal pain, blood in stool, diarrhea, nausea and vomiting.   Endocrine: Negative for polyphagia.   Genitourinary:   Positive for difficulty urinating. Negative for dysuria, flank pain and frequency.   Musculoskeletal:  Positive for back pain. Negative for arthralgias.   Skin:  Negative for rash.   Neurological:  Positive for weakness. Negative for dizziness, tremors, seizures, syncope, facial asymmetry, speech difficulty and headaches.   Hematological:  Negative for adenopathy.   Psychiatric/Behavioral:  Positive for confusion and sleep disturbance. Negative for agitation and hallucinations. The patient is nervous/anxious.    Objective:     Vital Signs (Most Recent):  Temp: 97.9 °F (36.6 °C) (08/27/22 1109)  Pulse: 65 (08/27/22 1109)  Resp: (!) 24 (08/27/22 1109)  BP: 128/60 (08/27/22 1109)  SpO2: (!) 93 % (08/27/22 1109)   Vital Signs (24h Range):  Temp:  [97.4 °F (36.3 °C)-98.1 °F (36.7 °C)] 97.9 °F (36.6 °C)  Pulse:  [61-99] 65  Resp:  [12-38] 24  SpO2:  [91 %-100 %] 93 %  BP: ()/(56-70) 128/60     Weight: 91.2 kg (201 lb)  Body mass index is 29.68 kg/m².    Physical Exam  Vitals and nursing note reviewed.   Constitutional:       General: He is not in acute distress.     Appearance: He is obese. He is ill-appearing.   HENT:      Head: Normocephalic and atraumatic.      Right Ear: External ear normal.      Left Ear: External ear normal.      Nose: Nose normal. No congestion or rhinorrhea.      Mouth/Throat:      Mouth: Mucous membranes are moist.      Pharynx: No oropharyngeal exudate.   Eyes:      General: No scleral icterus.     Extraocular Movements: Extraocular movements intact.   Neck:      Vascular: No carotid bruit.   Cardiovascular:      Rate and Rhythm: Normal rate and regular rhythm.      Heart sounds: Normal heart sounds. No murmur heard.    No friction rub. No gallop.   Pulmonary:      Effort: Pulmonary effort is normal. No respiratory distress.      Breath sounds: Normal breath sounds. No stridor. No wheezing, rhonchi or rales.   Chest:      Chest wall: No tenderness.   Abdominal:      General: Bowel sounds  are normal. There is no distension.      Palpations: Abdomen is soft. There is no mass.      Tenderness: There is no abdominal tenderness. There is no right CVA tenderness, left CVA tenderness, guarding or rebound.      Hernia: No hernia is present.   Musculoskeletal:         General: No swelling, tenderness or deformity.      Cervical back: Neck supple. No rigidity.      Right lower leg: No edema.      Left lower leg: No edema.   Lymphadenopathy:      Cervical: No cervical adenopathy.   Skin:     Capillary Refill: Capillary refill takes less than 2 seconds.      Coloration: Skin is not jaundiced or pale.      Findings: No rash.   Neurological:      Mental Status: He is alert. Mental status is at baseline.      Sensory: No sensory deficit.      Motor: Weakness present.      Coordination: Coordination normal.   Psychiatric:         Mood and Affect: Mood normal.         Behavior: Behavior normal.           Significant Labs: All pertinent labs within the past 24 hours have been reviewed.    Significant Imaging: I have reviewed all pertinent imaging results/findings within the past 24 hours.

## 2022-08-27 NOTE — PLAN OF CARE
No issues this shift. Patient rested well after PM meds. C/O nausea early in shift relieved with zofran. VSS, afebrile. UOP adequate, no BM

## 2022-08-27 NOTE — ASSESSMENT & PLAN NOTE
Likely medication and polypharmacy induced.  Stopping sul furea indefinitely and changing agents based on his renal disease.    POCT Glucose   Date Value Ref Range Status   08/27/2022 122 (H) 70 - 110 mg/dL Final   08/26/2022 139 (H) 70 - 110 mg/dL Final   08/26/2022 283 (H) 70 - 110 mg/dL Final   08/26/2022 304 (H) 70 - 110 mg/dL Final   08/26/2022 274 (H) 70 - 110 mg/dL Final   08/26/2022 308 (H) 70 - 110 mg/dL Final   08/26/2022 327 (H) 70 - 110 mg/dL Final   08/26/2022 403 (H) 70 - 110 mg/dL Final   08/25/2022 412 (H) 70 - 110 mg/dL Final   08/25/2022 311 (H) 70 - 110 mg/dL Final   08/25/2022 176 (H) 70 - 110 mg/dL Final   08/25/2022 137 (H) 70 - 110 mg/dL Final   08/25/2022 106 70 - 110 mg/dL Final   08/25/2022 48 (LL) 70 - 110 mg/dL Final   08/25/2022 45 (LL) 70 - 110 mg/dL Final   08/25/2022 <20 (L) 70 - 110 mg/dL Final   08/25/2022 <20 (L) 70 - 110 mg/dL Final   08/25/2022 51 (L) 70 - 110 mg/dL Final   08/25/2022 95 70 - 110 mg/dL Final   08/25/2022 134 (H) 70 - 110 mg/dL Final

## 2022-08-27 NOTE — CARE UPDATE
Informed by case management (inocente) that due to pt's insurance the nursing home will have to get authorization on Monday - so pt will go to [facility] when bed available

## 2022-08-27 NOTE — PLAN OF CARE
Patient states he would like counseling, Has been feeling depressed.  Crying, states he recently lost his wife.Informed Nurse Isidoro patient states he is depressed.

## 2022-08-27 NOTE — DISCHARGE SUMMARY
[facility] Medicine  Discharge Summary      Patient Name: Pam Gray  MRN: [id number]  Patient Class: IP- Inpatient  Admission Date: 8/25/2022  Hospital Length of Stay: 2 days  Discharge Date and Time:  08/27/2022 11:29 AM  Attending Physician: Bhanu Davila III, MD   Discharging Provider: Jose Espinoza Jr, MD  Primary Care Provider: Jacob Zuniga MD      HPI:   Patient is a 62-year-old male with a history of CVA dementia hypertension hyperlipidemia diabetes necrotizing fasciitis of the arm chronic kidney disease who was recently admitted to a local nursing home facility post back surgery.  The patient states he had back surgery several weeks ago and is in rehabilitative care.  Patient states he has been doing well and he began having drops in his blood sugar and was brought into our local ER.  He was initially discharged and then returned with blood sugars in the low 20s.  The patient has a history of CKD stage 3 to for and is currently on Amaryl and multiple other hypoglycemics.  Patient was admitted started on a D10 drip his blood sugars are now 3-400 and he is awake and eating breakfast this morning.  The patient has polypharmacy we reviewed his medications he does have some dementia.      * No surgery found *      Hospital Course:   Patient in good spirits this morning.  No more episodes of hypoglycemia.  He did have an acute tubular necrosis on top of his chronic kidney disease the but this appears to have stabilized.  Will continue to encourage adequate p.o. intake of fluids.  Recommend repeat BMP and CBC within 1 week in the outpatient setting to ensure ongoing stability.       Goals of Care Treatment Preferences:  Code Status: Full Code      Consults:     * Hypoglycemia  Likely medication and polypharmacy induced.  Stopping sul furea indefinitely and changing agents based on his renal disease.    POCT Glucose   Date Value Ref Range Status   08/27/2022 122 (H) 70 -  110 mg/dL Final   08/26/2022 139 (H) 70 - 110 mg/dL Final   08/26/2022 283 (H) 70 - 110 mg/dL Final   08/26/2022 304 (H) 70 - 110 mg/dL Final   08/26/2022 274 (H) 70 - 110 mg/dL Final   08/26/2022 308 (H) 70 - 110 mg/dL Final   08/26/2022 327 (H) 70 - 110 mg/dL Final   08/26/2022 403 (H) 70 - 110 mg/dL Final   08/25/2022 412 (H) 70 - 110 mg/dL Final   08/25/2022 311 (H) 70 - 110 mg/dL Final   08/25/2022 176 (H) 70 - 110 mg/dL Final   08/25/2022 137 (H) 70 - 110 mg/dL Final   08/25/2022 106 70 - 110 mg/dL Final   08/25/2022 48 (LL) 70 - 110 mg/dL Final   08/25/2022 45 (LL) 70 - 110 mg/dL Final   08/25/2022 <20 (L) 70 - 110 mg/dL Final   08/25/2022 <20 (L) 70 - 110 mg/dL Final   08/25/2022 51 (L) 70 - 110 mg/dL Final   08/25/2022 95 70 - 110 mg/dL Final   08/25/2022 134 (H) 70 - 110 mg/dL Final            MDD (major depressive disorder), recurrent episode, moderate  Patient well controlled with current medical therapy        Benzodiazepine + opiate dependence        Burst fracture of T12 vertebra        Chronic HFrEF (heart failure with reduced ejection fraction) ICM NYHA2 EF 30%  Appears euvolemic and well compensated.      Transaminasemia        Vascular dementia with delirium  Patient appears at baseline.      Decompensated cirrhosis of liver with hepatic encephalopathy without ascites        Hepatic encephalopathy  Continue lactulose.      CAD (coronary artery disease)  No chest pain currently      Class 1 obesity due to excess calories with serious comorbidity and body mass index (BMI) of 32.0 to 32.9 in adult  Body mass index is 29.68 kg/m². Morbid obesity complicates all aspects of disease management from diagnostic modalities to treatment. Weight loss encouraged and health benefits explained to patient.         CKD (chronic kidney disease), stage IV  This is complicated by acute on chronic injury secondary to hypoglycemia.  The patient is tolerating p.o..  Feel as though he is stable from a renal perspective  just needs close outpatient follow-up.    Lab Results   Component Value Date    CREATININE 3.6 (H) 08/27/2022    CREATININE 3.5 (H) 08/26/2022    CREATININE 3.3 (H) 08/25/2022            Final Active Diagnoses:    Diagnosis Date Noted POA    PRINCIPAL PROBLEM:  Hypoglycemia [E16.2] 08/26/2022 Yes    Benzodiazepine + opiate dependence [F13.20] 07/26/2022 Yes    MDD (major depressive disorder), recurrent episode, moderate [F33.1] 07/26/2022 Yes    Burst fracture of T12 vertebra [S22.081A] 07/25/2022 Yes    CAD (coronary artery disease) [I25.10] 04/10/2022 Yes    Chronic HFrEF (heart failure with reduced ejection fraction) ICM NYHA2 EF 30% [I50.22] 04/10/2022 Yes    Class 1 obesity due to excess calories with serious comorbidity and body mass index (BMI) of 32.0 to 32.9 in adult [E66.09, Z68.32] 04/10/2022 Not Applicable    Decompensated cirrhosis of liver with hepatic encephalopathy without ascites [K74.60] 04/10/2022 Yes    Hepatic encephalopathy [K72.90] 04/10/2022 Yes    Transaminasemia [R74.01] 04/10/2022 Yes    Vascular dementia with delirium [F01.50, F05] 04/10/2022 Yes    CKD (chronic kidney disease), stage IV [N18.4] 07/09/2021 Yes      Problems Resolved During this Admission:       Discharged Condition: good    Disposition: Skilled Nursing Facility    Follow Up:    Patient Instructions:      Diet diabetic     Activity as tolerated       Significant Diagnostic Studies: Labs: All labs within the past 24 hours have been reviewed    Pending Diagnostic Studies:     None         Medications:  Reconciled Home Medications:      Medication List      START taking these medications    insulin aspart U-100 100 unit/mL (3 mL) Inpn pen  Commonly known as: NovoLOG  Inject 1-10 Units into the skin before meals and at bedtime as needed (Hyperglycemia).     LEVEMIR FLEXTOUCH U-100 INSULN 100 unit/mL (3 mL) Inpn pen  Generic drug: insulin detemir U-100  Inject 10 Units into the skin every evening.        CHANGE how  you take these medications    clonazePAM 0.5 MG tablet  Commonly known as: KlonoPIN  Take 1 tablet (0.5 mg total) by mouth 3 (three) times daily.  What changed: when to take this        CONTINUE taking these medications    albuterol-ipratropium 2.5 mg-0.5 mg/3 mL nebulizer solution  Commonly known as: DUO-NEB  Take 3 mLs by nebulization every 6 (six) hours while awake. Rescue     aspirin 81 MG Chew  Take 1 tablet (81 mg total) by mouth once daily.     atorvastatin 40 MG tablet  Commonly known as: LIPITOR  Take 1 tablet (40 mg total) by mouth once daily.     doxazosin 2 MG tablet  Commonly known as: CARDURA  Take 1 tablet (2 mg total) by mouth once daily.     DULoxetine 30 MG capsule  Commonly known as: CYMBALTA  Take 1 capsule (30 mg total) by mouth 2 (two) times daily.     ferrous gluconate 324 MG tablet  Commonly known as: FERGON  Take 1 tablet (324 mg total) by mouth daily with breakfast.     fluticasone furoate-vilanteroL 100-25 mcg/dose diskus inhaler  Commonly known as: BREO  Inhale 1 puff into the lungs once daily. Controller     guaiFENesin 600 mg 12 hr tablet  Commonly known as: MUCINEX  Take 1 tablet (600 mg total) by mouth 2 (two) times daily. for 10 days     lactulose 10 gram packet  Commonly known as: CEPHULAC  Take 1 packet (10 g total) by mouth 2 (two) times daily.     levothyroxine 50 MCG tablet  Commonly known as: SYNTHROID  Take 1 tablet (50 mcg total) by mouth once daily.     metoprolol succinate 25 MG 24 hr tablet  Commonly known as: TOPROL-XL  Take 1 tablet (25 mg total) by mouth once daily.     nicotine 14 mg/24 hr  Commonly known as: NICODERM CQ  Place 1 patch onto the skin once daily.     NIFEdipine 90 MG (OSM) 24 hr tablet  Commonly known as: PROCARDIA-XL  Take 1 tablet (90 mg total) by mouth once daily.     pantoprazole 40 MG tablet  Commonly known as: PROTONIX  Take 1 tablet (40 mg total) by mouth once daily.     polyethylene glycol 17 gram Pwpk  Commonly known as: GLYCOLAX  Take 17 g by  mouth once daily.     QUEtiapine 50 MG tablet  Commonly known as: SEROQUEL  Take 1 tablet (50 mg total) by mouth 2 (two) times daily.     senna-docusate 8.6-50 mg 8.6-50 mg per tablet  Commonly known as: PERICOLACE  Take 1 tablet by mouth once daily.     tamsulosin 0.4 mg Cap  Commonly known as: FLOMAX  Take 1 capsule (0.4 mg total) by mouth once daily.     tiotropium bromide 2.5 mcg/actuation inhaler  Commonly known as: SPIRIVA RESPIMAT  Inhale 2 puffs into the lungs once daily. Controller        STOP taking these medications    albuterol 2.5 mg /3 mL (0.083 %) nebulizer solution  Commonly known as: PROVENTIL     amLODIPine 10 MG tablet  Commonly known as: NORVASC     furosemide 80 MG tablet  Commonly known as: LASIX     glimepiride 4 MG tablet  Commonly known as: AMARYL     isosorbide-hydrALAZINE 20-37.5 mg 20-37.5 mg Tab  Commonly known as: BIDIL     metoprolol tartrate 50 MG tablet  Commonly known as: LOPRESSOR     mirtazapine 30 MG tablet  Commonly known as: REMERON     omeprazole 40 MG capsule  Commonly known as: PRILOSEC     oxyCODONE-acetaminophen 7.5-325 mg per tablet  Commonly known as: PERCOCET     pioglitazone 30 MG tablet  Commonly known as: ACTOS     rivastigmine 4.6 mg/24 hour Pt24  Commonly known as: EXELON     SITagliptin 100 MG Tab  Commonly known as: JANUVIA     traZODone 50 MG tablet  Commonly known as: DESYREL            Indwelling Lines/Drains at time of discharge:   Lines/Drains/Airways     None                 Time spent on the discharge of patient: 60 minutes         Jose Espinoza Jr, MD  Department of Hospital Medicine  [place] [facility]

## 2022-08-27 NOTE — PROGRESS NOTES
[facility] Medicine  Progress Note    Patient Name: Pam Gray  MRN: [id number]  Patient Class: IP- Inpatient   Admission Date: 8/25/2022  Length of Stay: 2 days  Attending Physician: Bhanu Davila III, MD  Primary Care Provider: Jacob Zuniga MD        Subjective:     Principal Problem:Hypoglycemia        HPI:  Patient is a 62-year-old male with a history of CVA dementia hypertension hyperlipidemia diabetes necrotizing fasciitis of the arm chronic kidney disease who was recently admitted to a local nursing home facility post back surgery.  The patient states he had back surgery several weeks ago and is in rehabilitative care.  Patient states he has been doing well and he began having drops in his blood sugar and was brought into our local ER.  He was initially discharged and then returned with blood sugars in the low 20s.  The patient has a history of CKD stage 3 to for and is currently on Amaryl and multiple other hypoglycemics.  Patient was admitted started on a D10 drip his blood sugars are now 3-400 and he is awake and eating breakfast this morning.  The patient has polypharmacy we reviewed his medications he does have some dementia.      Overview/Hospital Course:  Patient in good spirits this morning.  No more episodes of hypoglycemia.  He did have an acute tubular necrosis on top of his chronic kidney disease the but this appears to have stabilized.  Will continue to encourage adequate p.o. intake of fluids.  Recommend repeat BMP and CBC within 1 week in the outpatient setting to ensure ongoing stability.    Update:  Nursing home currently unable to take the patient.  Will continue close inpatient monitoring with a plan for transition to skilled nursing facility on Monday.      Past Medical History:   Diagnosis Date    Anxiety     Arthritis     CHF (congestive heart failure)     Chronic kidney disease     COPD (chronic obstructive pulmonary disease)     Coronary  artery disease     Diabetes mellitus     Erectile dysfunction     Hypertension     Necrotizing fasciitis of forearm     Peripheral neuropathy     Stroke     TIA    Thyroid disease        Past Surgical History:   Procedure Laterality Date    arm surgery Right     arthroscopy Right     knee    COLONOSCOPY N/A 7/3/2019    Procedure: COLONOSCOPY;  Surgeon: Jagdish Pollock MD;  Location: [facility] ENDO;  Service: Endoscopy;  Laterality: N/A;    ESOPHAGOGASTRODUODENOSCOPY N/A 7/3/2019    Procedure: ESOPHAGOGASTRODUODENOSCOPY (EGD);  Surgeon: Jagdish Pollock MD;  Location: [facility] ENDO;  Service: Endoscopy;  Laterality: N/A;    EYE SURGERY      FUSION, SPINE, POSTERIOR APPROACH N/A 7/29/2022    Procedure: FUSION,SPINE,POSTERIOR APPROACH, T9-L3;  Surgeon: Luis E Lawson MD;  Location: [facility] OR;  Service: Orthopedics;  Laterality: N/A;    ROTATOR CUFF REPAIR Right     TONSILLECTOMY      VERTEBRAL CORPECTOMY N/A 7/27/2022    Procedure: CORPECTOMY T12;  Surgeon: Luis E Lawson MD;  Location: [facility] OR;  Service: Orthopedics;  Laterality: N/A;       Review of patient's allergies indicates:   Allergen Reactions    Onion     Pork/porcine containing products     Shellfish containing products     Shrimp        No current facility-administered medications on file prior to encounter.     Current Outpatient Medications on File Prior to Encounter   Medication Sig    albuterol-ipratropium (DUO-NEB) 2.5 mg-0.5 mg/3 mL nebulizer solution Take 3 mLs by nebulization every 6 (six) hours while awake. Rescue    aspirin 81 MG Chew Take 1 tablet (81 mg total) by mouth once daily.    atorvastatin (LIPITOR) 40 MG tablet Take 1 tablet (40 mg total) by mouth once daily.    clonazePAM (KLONOPIN) 0.5 MG tablet Take 1 tablet (0.5 mg total) by mouth 3 (three) times daily. (Patient taking differently: Take 0.5 mg by mouth 2 (two) times daily.)    doxazosin (CARDURA) 2 MG tablet Take 1 tablet (2 mg total) by mouth once daily.     DULoxetine (CYMBALTA) 30 MG capsule Take 1 capsule (30 mg total) by mouth 2 (two) times daily.    ferrous gluconate (FERGON) 324 MG tablet Take 1 tablet (324 mg total) by mouth daily with breakfast.    fluticasone furoate-vilanteroL (BREO) 100-25 mcg/dose diskus inhaler Inhale 1 puff into the lungs once daily. Controller    guaiFENesin (MUCINEX) 600 mg 12 hr tablet Take 1 tablet (600 mg total) by mouth 2 (two) times daily. for 10 days    lactulose (CEPHULAC) 10 gram packet Take 1 packet (10 g total) by mouth 2 (two) times daily.    metoprolol succinate (TOPROL-XL) 25 MG 24 hr tablet Take 1 tablet (25 mg total) by mouth once daily.    nicotine (NICODERM CQ) 14 mg/24 hr Place 1 patch onto the skin once daily.    NIFEdipine (PROCARDIA-XL) 90 MG (OSM) 24 hr tablet Take 1 tablet (90 mg total) by mouth once daily.    pantoprazole (PROTONIX) 40 MG tablet Take 1 tablet (40 mg total) by mouth once daily.    polyethylene glycol (GLYCOLAX) 17 gram PwPk Take 17 g by mouth once daily.    QUEtiapine (SEROQUEL) 50 MG tablet Take 1 tablet (50 mg total) by mouth 2 (two) times daily.    senna-docusate 8.6-50 mg (PERICOLACE) 8.6-50 mg per tablet Take 1 tablet by mouth once daily.    tiotropium bromide (SPIRIVA RESPIMAT) 2.5 mcg/actuation inhaler Inhale 2 puffs into the lungs once daily. Controller    [DISCONTINUED] albuterol (PROVENTIL) 2.5 mg /3 mL (0.083 %) nebulizer solution USE 1 VIAL VIA NEBULIZER EVERY 6 HOURS AS NEEDED 7/9/21    [DISCONTINUED] amLODIPine (NORVASC) 10 MG tablet Take 1 tablet (10 mg total) by mouth once daily.    [DISCONTINUED] furosemide (LASIX) 80 MG tablet Take 1 tablet (80 mg total) by mouth 2 (two) times daily.    [DISCONTINUED] glimepiride (AMARYL) 4 MG tablet Take 1 tablet (4 mg total) by mouth before breakfast.    [DISCONTINUED] isosorbide-hydrALAZINE 20-37.5 mg (BIDIL) 20-37.5 mg Tab Take 1 tablet by mouth 2 (two) times daily.    [DISCONTINUED] metoprolol tartrate (LOPRESSOR) 50 MG  tablet Take 1 tablet (50 mg total) by mouth 2 (two) times daily.    [DISCONTINUED] mirtazapine (REMERON) 30 MG tablet Take 30 mg by mouth every evening.    [DISCONTINUED] omeprazole (PRILOSEC) 40 MG capsule Take 1 capsule (40 mg total) by mouth once daily.    [DISCONTINUED] oxyCODONE-acetaminophen (PERCOCET) 7.5-325 mg per tablet Take 1 tablet by mouth every 6 (six) hours as needed for Pain (M43.22).    [DISCONTINUED] pioglitazone (ACTOS) 30 MG tablet Take 30 mg by mouth once daily.    [DISCONTINUED] rivastigmine (EXELON) 4.6 mg/24 hour PT24 Place 1 patch onto the skin once daily.    [DISCONTINUED] SITagliptin (JANUVIA) 100 MG Tab Take 100 mg by mouth once daily.    [DISCONTINUED] traZODone (DESYREL) 50 MG tablet Take 50 mg by mouth every evening.     Family History       Problem Relation (Age of Onset)    COPD Mother    Cancer Brother    Diabetes Father          Tobacco Use    Smoking status: Every Day     Packs/day: 1.00     Years: 40.00     Pack years: 40.00     Types: Cigarettes    Smokeless tobacco: Never   Substance and Sexual Activity    Alcohol use: Not Currently    Drug use: Not Currently    Sexual activity: Not on file     Review of Systems   Constitutional:  Positive for activity change, appetite change and fatigue. Negative for chills and fever.   HENT:  Negative for ear pain, mouth sores, nosebleeds and sore throat.    Eyes:  Negative for visual disturbance.   Respiratory:  Negative for cough, shortness of breath and wheezing.    Cardiovascular:  Negative for chest pain, palpitations and leg swelling.   Gastrointestinal:  Positive for constipation. Negative for abdominal distention, abdominal pain, blood in stool, diarrhea, nausea and vomiting.   Endocrine: Negative for polyphagia.   Genitourinary:  Positive for difficulty urinating. Negative for dysuria, flank pain and frequency.   Musculoskeletal:  Positive for back pain. Negative for arthralgias.   Skin:  Negative for rash.    Neurological:  Positive for weakness. Negative for dizziness, tremors, seizures, syncope, facial asymmetry, speech difficulty and headaches.   Hematological:  Negative for adenopathy.   Psychiatric/Behavioral:  Positive for confusion and sleep disturbance. Negative for agitation and hallucinations. The patient is nervous/anxious.    Objective:     Vital Signs (Most Recent):  Temp: 97.9 °F (36.6 °C) (08/27/22 1109)  Pulse: 65 (08/27/22 1109)  Resp: (!) 24 (08/27/22 1109)  BP: 128/60 (08/27/22 1109)  SpO2: (!) 93 % (08/27/22 1109)   Vital Signs (24h Range):  Temp:  [97.4 °F (36.3 °C)-98.1 °F (36.7 °C)] 97.9 °F (36.6 °C)  Pulse:  [61-99] 65  Resp:  [12-38] 24  SpO2:  [91 %-100 %] 93 %  BP: ()/(56-70) 128/60     Weight: 91.2 kg (201 lb)  Body mass index is 29.68 kg/m².    Physical Exam  Vitals and nursing note reviewed.   Constitutional:       General: He is not in acute distress.     Appearance: He is obese. He is ill-appearing.   HENT:      Head: Normocephalic and atraumatic.      Right Ear: External ear normal.      Left Ear: External ear normal.      Nose: Nose normal. No congestion or rhinorrhea.      Mouth/Throat:      Mouth: Mucous membranes are moist.      Pharynx: No oropharyngeal exudate.   Eyes:      General: No scleral icterus.     Extraocular Movements: Extraocular movements intact.   Neck:      Vascular: No carotid bruit.   Cardiovascular:      Rate and Rhythm: Normal rate and regular rhythm.      Heart sounds: Normal heart sounds. No murmur heard.    No friction rub. No gallop.   Pulmonary:      Effort: Pulmonary effort is normal. No respiratory distress.      Breath sounds: Normal breath sounds. No stridor. No wheezing, rhonchi or rales.   Chest:      Chest wall: No tenderness.   Abdominal:      General: Bowel sounds are normal. There is no distension.      Palpations: Abdomen is soft. There is no mass.      Tenderness: There is no abdominal tenderness. There is no right CVA tenderness, left CVA  tenderness, guarding or rebound.      Hernia: No hernia is present.   Musculoskeletal:         General: No swelling, tenderness or deformity.      Cervical back: Neck supple. No rigidity.      Right lower leg: No edema.      Left lower leg: No edema.   Lymphadenopathy:      Cervical: No cervical adenopathy.   Skin:     Capillary Refill: Capillary refill takes less than 2 seconds.      Coloration: Skin is not jaundiced or pale.      Findings: No rash.   Neurological:      Mental Status: He is alert. Mental status is at baseline.      Sensory: No sensory deficit.      Motor: Weakness present.      Coordination: Coordination normal.   Psychiatric:         Mood and Affect: Mood normal.         Behavior: Behavior normal.           Significant Labs: All pertinent labs within the past 24 hours have been reviewed.    Significant Imaging: I have reviewed all pertinent imaging results/findings within the past 24 hours.      Assessment/Plan:      * Hypoglycemia  Likely medication and polypharmacy induced.  Stopping sul furea indefinitely and changing agents based on his renal disease.    POCT Glucose   Date Value Ref Range Status   08/27/2022 122 (H) 70 - 110 mg/dL Final   08/26/2022 139 (H) 70 - 110 mg/dL Final   08/26/2022 283 (H) 70 - 110 mg/dL Final   08/26/2022 304 (H) 70 - 110 mg/dL Final   08/26/2022 274 (H) 70 - 110 mg/dL Final   08/26/2022 308 (H) 70 - 110 mg/dL Final   08/26/2022 327 (H) 70 - 110 mg/dL Final   08/26/2022 403 (H) 70 - 110 mg/dL Final   08/25/2022 412 (H) 70 - 110 mg/dL Final   08/25/2022 311 (H) 70 - 110 mg/dL Final   08/25/2022 176 (H) 70 - 110 mg/dL Final   08/25/2022 137 (H) 70 - 110 mg/dL Final   08/25/2022 106 70 - 110 mg/dL Final   08/25/2022 48 (LL) 70 - 110 mg/dL Final   08/25/2022 45 (LL) 70 - 110 mg/dL Final   08/25/2022 <20 (L) 70 - 110 mg/dL Final   08/25/2022 <20 (L) 70 - 110 mg/dL Final   08/25/2022 51 (L) 70 - 110 mg/dL Final   08/25/2022 95 70 - 110 mg/dL Final   08/25/2022 134 (H) 70  - 110 mg/dL Final            MDD (major depressive disorder), recurrent episode, moderate  Patient well controlled with current medical therapy        Benzodiazepine + opiate dependence        Burst fracture of T12 vertebra        Chronic HFrEF (heart failure with reduced ejection fraction) ICM NYHA2 EF 30%  Appears euvolemic and well compensated.      Transaminasemia        Vascular dementia with delirium  Patient appears at baseline.      Decompensated cirrhosis of liver with hepatic encephalopathy without ascites        Hepatic encephalopathy  Continue lactulose.      CAD (coronary artery disease)  No chest pain currently      Class 1 obesity due to excess calories with serious comorbidity and body mass index (BMI) of 32.0 to 32.9 in adult  Body mass index is 29.68 kg/m². Morbid obesity complicates all aspects of disease management from diagnostic modalities to treatment. Weight loss encouraged and health benefits explained to patient.         CKD (chronic kidney disease), stage IV  This is complicated by acute on chronic injury secondary to hypoglycemia.  The patient is tolerating p.o..  Feel as though he is stable from a renal perspective just needs close outpatient follow-up.    Lab Results   Component Value Date    CREATININE 3.6 (H) 08/27/2022    CREATININE 3.5 (H) 08/26/2022    CREATININE 3.3 (H) 08/25/2022            VTE Risk Mitigation (From admission, onward)         Ordered     IP VTE HIGH RISK PATIENT  Once         08/25/22 2037     Place sequential compression device  Until discontinued         08/25/22 2037                Discharge Planning   DIANN: 8/27/2022     Code Status: Full Code   Is the patient medically ready for discharge?:     Reason for patient still in hospital (select all that apply): Treatment  Discharge Plan A: Skilled Nursing Facility, Return to nursing home   Discharge Delays: (!) Other (transportation to nursing home)              Jose Espinoza Jr, MD  Department of Hospital  Medicine   [facility] - Intensive Care

## 2022-08-27 NOTE — CARE UPDATE
Transferred to 612 after report given to Pretty RN - pt oriented to room, equipment, and nurse - olivia (pt;s son) called and notified him that pt will not be discharged to nursing home today and will be in room 612 - voiced understanding - pt in stable condition upon transfer and instructed to call for any assistance and not get up without assistance - voiced understanding

## 2022-08-27 NOTE — PLAN OF CARE
08/27/22 1419   Medicare Message   Important Message from Medicare regarding Discharge Appeal Rights Explained to patient/caregiver;Signed/date by patient/caregiver   Date IMM was signed 08/27/22   Time IMM was signed 1419   Explained Medicare IM appeal rights to patient.  Patient signs IM.

## 2022-08-27 NOTE — HOSPITAL COURSE
Patient in good spirits this morning.  No more episodes of hypoglycemia.  He did have an acute tubular necrosis on top of his chronic kidney disease the but this appears to have stabilized.  Will continue to encourage adequate p.o. intake of fluids.  Recommend repeat BMP and CBC within 1 week in the outpatient setting to ensure ongoing stability.    Update:  Nursing home currently unable to take the patient.  Will continue close inpatient monitoring with a plan for transition to skilled nursing facility on Monday.    8/28:  no acute events overnight.  Patient resting well this am.

## 2022-08-27 NOTE — PT/OT/SLP PROGRESS
Occupational Therapy   Treatment    Name: Pam Gray  MRN: [id number]  Admitting Diagnosis:  Hypoglycemia       Recommendations:     Discharge Recommendations: home with home health, nursing facility, skilled  Discharge Equipment Recommendations:  hospital bed, shower chair, bedside commode  Barriers to discharge:   (medical status)    Assessment:     Pam Gray is a 62 y.o. male with a medical diagnosis of Hypoglycemia.  He presents supine, agreeable to sit on EOB at this time.. Performance deficits affecting function are weakness, impaired endurance, impaired self care skills, impaired functional mobility, gait instability, impaired balance, decreased safety awareness, pain, impaired cardiopulmonary response to activity.     Rehab Prognosis:  Good and Fair; patient would benefit from acute skilled OT services to address these deficits and reach maximum level of function.       Plan:     Patient to be seen 6 x/week to address the above listed problems via self-care/home management, therapeutic activities, therapeutic exercises  Plan of Care Expires: 09/16/22  Plan of Care Reviewed with: patient    Subjective     Pain/Comfort:  Pain Rating 1:  (did not rate)  Location 1: back  Pain Addressed 1: Cessation of Activity, Reposition (pt with chronic back pain, able to reposition self as needed.)    Objective:     Communicated with: nursing prior to session.  Patient found supine with blood pressure cuff, pulse ox (continuous), telemetry, peripheral IV, SCD upon OT entry to room.    General Precautions: Standard, fall   Orthopedic Precautions:N/A   Braces: N/A  Respiratory Status: Room air     Occupational Performance:     Bed Mobility:    Patient completed Supine to Sit with supervision  Patient completed Sit to Supine with stand by assistance     Functional Mobility/Transfers:  Patient completed Sit <> Stand Transfer with stand by assistance and contact guard assistance  with  no assistive device    Functional Mobility: Pt took 4 steps laterally to his left for better positioning in bed.  Pt refused to complete functional ambulation away from bed due to fear of falling.    Activities of Daily Living:  Lower Body Dressing: dependence to don socks, pt reported he was unable and refused to attempt.      [facility] 6 Click ADL: 16    Treatment & Education:  From seated EOB, pt completed two static stands using bedrails and HHA.  During second stand, pt completed 4 steps laterally to left.  Pt did not want to attempt ambulating away from due to fear of falling.  Pt then completed BUE/BLE exercises including 5 sit to stands.  Pt with some tachycardia, but resolved quickly at rest.      Patient left supine with all lines intact and call button in reach    GOALS:   Multidisciplinary Problems       Occupational Therapy Goals          Problem: Occupational Therapy    Goal Priority Disciplines Outcome Interventions   Occupational Therapy Goal     OT, PT/OT     Description: Goals to be met by: 9/16/2022     Patient will increase functional independence with ADLs by performing:    Grooming while EOB with Set-up Assistance.  Toileting from bedside commode with Moderate Assistance for hygiene and clothing management.   Sitting at edge of bed x10 minutes with Contact Guard Assistance.  Step transfer with Minimal Assistance with RW.                         Time Tracking:     OT Date of Treatment: 08/27/22  OT Start Time: 1010  OT Stop Time: 1024  OT Total Time (min): 14 min    Billable Minutes:Therapeutic Activity 14    OT/JEN: OT          8/27/2022

## 2022-08-27 NOTE — ASSESSMENT & PLAN NOTE
This is complicated by acute on chronic injury secondary to hypoglycemia.  The patient is tolerating p.o..  Feel as though he is stable from a renal perspective just needs close outpatient follow-up.    Lab Results   Component Value Date    CREATININE 3.6 (H) 08/27/2022    CREATININE 3.5 (H) 08/26/2022    CREATININE 3.3 (H) 08/25/2022

## 2022-08-27 NOTE — PLAN OF CARE
Problem: Occupational Therapy  Goal: Occupational Therapy Goal  Description: Goals to be met by: 9/16/2022     Patient will increase functional independence with ADLs by performing:    Grooming while EOB with Set-up Assistance.  Toileting from bedside commode with Moderate Assistance for hygiene and clothing management.   Sitting at edge of bed x10 minutes with Contact Guard Assistance.  Step transfer with Minimal Assistance with RW.    Outcome: Ongoing, Progressing

## 2022-08-28 LAB
POCT GLUCOSE: 126 MG/DL (ref 70–110)
POCT GLUCOSE: 130 MG/DL (ref 70–110)
POCT GLUCOSE: 178 MG/DL (ref 70–110)

## 2022-08-28 PROCEDURE — 25000003 PHARM REV CODE 250: Performed by: STUDENT IN AN ORGANIZED HEALTH CARE EDUCATION/TRAINING PROGRAM

## 2022-08-28 PROCEDURE — 99900031 HC PATIENT EDUCATION (STAT)

## 2022-08-28 PROCEDURE — S4991 NICOTINE PATCH NONLEGEND: HCPCS | Performed by: INTERNAL MEDICINE

## 2022-08-28 PROCEDURE — 94761 N-INVAS EAR/PLS OXIMETRY MLT: CPT

## 2022-08-28 PROCEDURE — 25000242 PHARM REV CODE 250 ALT 637 W/ HCPCS: Performed by: INTERNAL MEDICINE

## 2022-08-28 PROCEDURE — 99900035 HC TECH TIME PER 15 MIN (STAT)

## 2022-08-28 PROCEDURE — 94640 AIRWAY INHALATION TREATMENT: CPT

## 2022-08-28 PROCEDURE — 11000001 HC ACUTE MED/SURG PRIVATE ROOM

## 2022-08-28 PROCEDURE — 25000003 PHARM REV CODE 250: Performed by: INTERNAL MEDICINE

## 2022-08-28 RX ADMIN — BUDESONIDE 0.5 MG: 0.5 INHALANT RESPIRATORY (INHALATION) at 08:08

## 2022-08-28 RX ADMIN — TAMSULOSIN HYDROCHLORIDE 0.4 MG: 0.4 CAPSULE ORAL at 09:08

## 2022-08-28 RX ADMIN — BUDESONIDE 0.5 MG: 0.5 INHALANT RESPIRATORY (INHALATION) at 07:08

## 2022-08-28 RX ADMIN — QUETIAPINE FUMARATE 50 MG: 50 TABLET ORAL at 08:08

## 2022-08-28 RX ADMIN — PANTOPRAZOLE SODIUM 40 MG: 40 TABLET, DELAYED RELEASE ORAL at 09:08

## 2022-08-28 RX ADMIN — IPRATROPIUM BROMIDE AND ALBUTEROL SULFATE 3 ML: 2.5; .5 SOLUTION RESPIRATORY (INHALATION) at 08:08

## 2022-08-28 RX ADMIN — LACTULOSE 10 G: 20 SOLUTION ORAL at 09:08

## 2022-08-28 RX ADMIN — DULOXETINE 30 MG: 30 CAPSULE, DELAYED RELEASE ORAL at 09:08

## 2022-08-28 RX ADMIN — DOXAZOSIN 2 MG: 2 TABLET ORAL at 08:08

## 2022-08-28 RX ADMIN — MUPIROCIN: 20 OINTMENT TOPICAL at 08:08

## 2022-08-28 RX ADMIN — ACETAMINOPHEN 650 MG: 325 TABLET ORAL at 02:08

## 2022-08-28 RX ADMIN — SITAGLIPTIN 100 MG: 100 TABLET, FILM COATED ORAL at 09:08

## 2022-08-28 RX ADMIN — LACTULOSE 10 G: 20 SOLUTION ORAL at 08:08

## 2022-08-28 RX ADMIN — IPRATROPIUM BROMIDE AND ALBUTEROL SULFATE 3 ML: 2.5; .5 SOLUTION RESPIRATORY (INHALATION) at 07:08

## 2022-08-28 RX ADMIN — METOPROLOL SUCCINATE 25 MG: 25 TABLET, EXTENDED RELEASE ORAL at 09:08

## 2022-08-28 RX ADMIN — ARFORMOTEROL TARTRATE 15 MCG: 15 SOLUTION RESPIRATORY (INHALATION) at 07:08

## 2022-08-28 RX ADMIN — LEVOTHYROXINE SODIUM 50 MCG: 0.05 TABLET ORAL at 09:08

## 2022-08-28 RX ADMIN — NIFEDIPINE 90 MG: 30 TABLET, FILM COATED, EXTENDED RELEASE ORAL at 09:08

## 2022-08-28 RX ADMIN — MUPIROCIN: 20 OINTMENT TOPICAL at 09:08

## 2022-08-28 RX ADMIN — POLYETHYLENE GLYCOL (3350) 17 G: 17 POWDER, FOR SOLUTION ORAL at 09:08

## 2022-08-28 RX ADMIN — NICOTINE 1 PATCH: 14 PATCH, EXTENDED RELEASE TRANSDERMAL at 10:08

## 2022-08-28 RX ADMIN — ATORVASTATIN CALCIUM 40 MG: 40 TABLET, FILM COATED ORAL at 09:08

## 2022-08-28 RX ADMIN — ARFORMOTEROL TARTRATE 15 MCG: 15 SOLUTION RESPIRATORY (INHALATION) at 08:08

## 2022-08-28 RX ADMIN — IPRATROPIUM BROMIDE AND ALBUTEROL SULFATE 3 ML: 2.5; .5 SOLUTION RESPIRATORY (INHALATION) at 01:08

## 2022-08-28 RX ADMIN — ASPIRIN 81 MG CHEWABLE TABLET 81 MG: 81 TABLET CHEWABLE at 09:08

## 2022-08-28 NOTE — NURSING
8/27/2022 7:07 PM   Patient care assumed from reporting nurse. Pt rounded on during bedside shift report. All questions answered. Introduced myself to patient as assigned RN for the current shift. Call light in reach, rails up x2, fall precautions reviewed with pt.  Pt awake in bed watching tv. Provided another blanket for comfort. Pt requesting to be shaved either tonight or in the am. Will arrange with staff.

## 2022-08-28 NOTE — PROGRESS NOTES
[facility] Medicine  Progress Note    Patient Name: Pam Gray  MRN: [id number]  Patient Class: IP- Inpatient   Admission Date: 8/25/2022  Length of Stay: 3 days  Attending Physician: Bhanu Davila III, MD  Primary Care Provider: Jacob Zuniga MD        Subjective:     Principal Problem:Hypoglycemia        HPI:  Patient is a 62-year-old male with a history of CVA dementia hypertension hyperlipidemia diabetes necrotizing fasciitis of the arm chronic kidney disease who was recently admitted to a local nursing home facility post back surgery.  The patient states he had back surgery several weeks ago and is in rehabilitative care.  Patient states he has been doing well and he began having drops in his blood sugar and was brought into our local ER.  He was initially discharged and then returned with blood sugars in the low 20s.  The patient has a history of CKD stage 3 to for and is currently on Amaryl and multiple other hypoglycemics.  Patient was admitted started on a D10 drip his blood sugars are now 3-400 and he is awake and eating breakfast this morning.  The patient has polypharmacy we reviewed his medications he does have some dementia.      Overview/Hospital Course:  Patient in good spirits this morning.  No more episodes of hypoglycemia.  He did have an acute tubular necrosis on top of his chronic kidney disease the but this appears to have stabilized.  Will continue to encourage adequate p.o. intake of fluids.  Recommend repeat BMP and CBC within 1 week in the outpatient setting to ensure ongoing stability.    Update:  Nursing home currently unable to take the patient.  Will continue close inpatient monitoring with a plan for transition to skilled nursing facility on Monday.    8/28:  no acute events overnight.  Patient resting well this am.      Past Medical History:   Diagnosis Date    Anxiety     Arthritis     CHF (congestive heart failure)     Chronic kidney disease      COPD (chronic obstructive pulmonary disease)     Coronary artery disease     Diabetes mellitus     Erectile dysfunction     Hypertension     Necrotizing fasciitis of forearm     Peripheral neuropathy     Stroke     TIA    Thyroid disease        Past Surgical History:   Procedure Laterality Date    arm surgery Right     arthroscopy Right     knee    COLONOSCOPY N/A 7/3/2019    Procedure: COLONOSCOPY;  Surgeon: Jagdish Pollock MD;  Location: [facility] ENDO;  Service: Endoscopy;  Laterality: N/A;    ESOPHAGOGASTRODUODENOSCOPY N/A 7/3/2019    Procedure: ESOPHAGOGASTRODUODENOSCOPY (EGD);  Surgeon: Jagdish Pollock MD;  Location: [facility] ENDO;  Service: Endoscopy;  Laterality: N/A;    EYE SURGERY      FUSION, SPINE, POSTERIOR APPROACH N/A 7/29/2022    Procedure: FUSION,SPINE,POSTERIOR APPROACH, T9-L3;  Surgeon: Luis E Lawson MD;  Location: [facility] OR;  Service: Orthopedics;  Laterality: N/A;    ROTATOR CUFF REPAIR Right     TONSILLECTOMY      VERTEBRAL CORPECTOMY N/A 7/27/2022    Procedure: CORPECTOMY T12;  Surgeon: Luis E Lawson MD;  Location: [facility] OR;  Service: Orthopedics;  Laterality: N/A;       Review of patient's allergies indicates:   Allergen Reactions    Onion     Pork/porcine containing products     Shellfish containing products     Shrimp        No current facility-administered medications on file prior to encounter.     Current Outpatient Medications on File Prior to Encounter   Medication Sig    albuterol-ipratropium (DUO-NEB) 2.5 mg-0.5 mg/3 mL nebulizer solution Take 3 mLs by nebulization every 6 (six) hours while awake. Rescue    aspirin 81 MG Chew Take 1 tablet (81 mg total) by mouth once daily.    atorvastatin (LIPITOR) 40 MG tablet Take 1 tablet (40 mg total) by mouth once daily.    clonazePAM (KLONOPIN) 0.5 MG tablet Take 1 tablet (0.5 mg total) by mouth 3 (three) times daily. (Patient taking differently: Take 0.5 mg by mouth 2 (two) times daily.)    doxazosin (CARDURA)  2 MG tablet Take 1 tablet (2 mg total) by mouth once daily.    DULoxetine (CYMBALTA) 30 MG capsule Take 1 capsule (30 mg total) by mouth 2 (two) times daily.    ferrous gluconate (FERGON) 324 MG tablet Take 1 tablet (324 mg total) by mouth daily with breakfast.    fluticasone furoate-vilanteroL (BREO) 100-25 mcg/dose diskus inhaler Inhale 1 puff into the lungs once daily. Controller    guaiFENesin (MUCINEX) 600 mg 12 hr tablet Take 1 tablet (600 mg total) by mouth 2 (two) times daily. for 10 days    lactulose (CEPHULAC) 10 gram packet Take 1 packet (10 g total) by mouth 2 (two) times daily.    metoprolol succinate (TOPROL-XL) 25 MG 24 hr tablet Take 1 tablet (25 mg total) by mouth once daily.    nicotine (NICODERM CQ) 14 mg/24 hr Place 1 patch onto the skin once daily.    NIFEdipine (PROCARDIA-XL) 90 MG (OSM) 24 hr tablet Take 1 tablet (90 mg total) by mouth once daily.    pantoprazole (PROTONIX) 40 MG tablet Take 1 tablet (40 mg total) by mouth once daily.    polyethylene glycol (GLYCOLAX) 17 gram PwPk Take 17 g by mouth once daily.    QUEtiapine (SEROQUEL) 50 MG tablet Take 1 tablet (50 mg total) by mouth 2 (two) times daily.    senna-docusate 8.6-50 mg (PERICOLACE) 8.6-50 mg per tablet Take 1 tablet by mouth once daily.    tiotropium bromide (SPIRIVA RESPIMAT) 2.5 mcg/actuation inhaler Inhale 2 puffs into the lungs once daily. Controller    [DISCONTINUED] albuterol (PROVENTIL) 2.5 mg /3 mL (0.083 %) nebulizer solution USE 1 VIAL VIA NEBULIZER EVERY 6 HOURS AS NEEDED 7/9/21    [DISCONTINUED] amLODIPine (NORVASC) 10 MG tablet Take 1 tablet (10 mg total) by mouth once daily.    [DISCONTINUED] furosemide (LASIX) 80 MG tablet Take 1 tablet (80 mg total) by mouth 2 (two) times daily.    [DISCONTINUED] isosorbide-hydrALAZINE 20-37.5 mg (BIDIL) 20-37.5 mg Tab Take 1 tablet by mouth 2 (two) times daily.    [DISCONTINUED] metoprolol tartrate (LOPRESSOR) 50 MG tablet Take 1 tablet (50 mg total) by mouth 2  (two) times daily.    [DISCONTINUED] mirtazapine (REMERON) 30 MG tablet Take 30 mg by mouth every evening.    [DISCONTINUED] omeprazole (PRILOSEC) 40 MG capsule Take 1 capsule (40 mg total) by mouth once daily.    [DISCONTINUED] pioglitazone (ACTOS) 30 MG tablet Take 30 mg by mouth once daily.    [DISCONTINUED] rivastigmine (EXELON) 4.6 mg/24 hour PT24 Place 1 patch onto the skin once daily.    [DISCONTINUED] SITagliptin (JANUVIA) 100 MG Tab Take 100 mg by mouth once daily.    [DISCONTINUED] traZODone (DESYREL) 50 MG tablet Take 50 mg by mouth every evening.     Family History       Problem Relation (Age of Onset)    COPD Mother    Cancer Brother    Diabetes Father          Tobacco Use    Smoking status: Every Day     Packs/day: 1.00     Years: 40.00     Pack years: 40.00     Types: Cigarettes    Smokeless tobacco: Never   Substance and Sexual Activity    Alcohol use: Not Currently    Drug use: Not Currently    Sexual activity: Not on file     Review of Systems   Constitutional:  Positive for activity change, appetite change and fatigue. Negative for chills and fever.   HENT:  Negative for ear pain, mouth sores, nosebleeds and sore throat.    Eyes:  Negative for visual disturbance.   Respiratory:  Negative for cough, shortness of breath and wheezing.    Cardiovascular:  Negative for chest pain, palpitations and leg swelling.   Gastrointestinal:  Positive for constipation. Negative for abdominal distention, abdominal pain, blood in stool, diarrhea, nausea and vomiting.   Endocrine: Negative for polyphagia.   Genitourinary:  Positive for difficulty urinating. Negative for dysuria, flank pain and frequency.   Musculoskeletal:  Positive for back pain. Negative for arthralgias.   Skin:  Negative for rash.   Neurological:  Positive for weakness. Negative for dizziness, tremors, seizures, syncope, facial asymmetry, speech difficulty and headaches.   Hematological:  Negative for adenopathy.    Psychiatric/Behavioral:  Positive for confusion and sleep disturbance. Negative for agitation and hallucinations. The patient is nervous/anxious.    Objective:     Vital Signs (Most Recent):  Temp: 97.4 °F (36.3 °C) (08/28/22 0825)  Pulse: 80 (08/28/22 0825)  Resp: 20 (08/28/22 0825)  BP: 135/63 (08/28/22 0930)  SpO2: 97 % (08/28/22 0825)   Vital Signs (24h Range):  Temp:  [97.4 °F (36.3 °C)-98.5 °F (36.9 °C)] 97.4 °F (36.3 °C)  Pulse:  [67-80] 80  Resp:  [17-33] 20  SpO2:  [92 %-100 %] 97 %  BP: ()/(53-70) 135/63     Weight: 91.2 kg (201 lb)  Body mass index is 29.68 kg/m².    Physical Exam  Vitals and nursing note reviewed.   Constitutional:       General: He is not in acute distress.     Appearance: He is obese. He is ill-appearing.   HENT:      Head: Normocephalic and atraumatic.      Right Ear: External ear normal.      Left Ear: External ear normal.      Nose: Nose normal. No congestion or rhinorrhea.      Mouth/Throat:      Mouth: Mucous membranes are moist.      Pharynx: No oropharyngeal exudate.   Eyes:      General: No scleral icterus.     Extraocular Movements: Extraocular movements intact.   Neck:      Vascular: No carotid bruit.   Cardiovascular:      Rate and Rhythm: Normal rate and regular rhythm.      Heart sounds: Normal heart sounds. No murmur heard.    No friction rub. No gallop.   Pulmonary:      Effort: Pulmonary effort is normal. No respiratory distress.      Breath sounds: Normal breath sounds. No stridor. No wheezing, rhonchi or rales.   Chest:      Chest wall: No tenderness.   Abdominal:      General: Bowel sounds are normal. There is no distension.      Palpations: Abdomen is soft. There is no mass.      Tenderness: There is no abdominal tenderness. There is no right CVA tenderness, left CVA tenderness, guarding or rebound.      Hernia: No hernia is present.   Musculoskeletal:         General: No swelling, tenderness or deformity.      Cervical back: Neck supple. No rigidity.       Right lower leg: No edema.      Left lower leg: No edema.   Lymphadenopathy:      Cervical: No cervical adenopathy.   Skin:     Capillary Refill: Capillary refill takes less than 2 seconds.      Coloration: Skin is not jaundiced or pale.      Findings: No rash.   Neurological:      Mental Status: He is alert. Mental status is at baseline.      Sensory: No sensory deficit.      Motor: Weakness present.      Coordination: Coordination normal.   Psychiatric:         Mood and Affect: Mood normal.         Behavior: Behavior normal.           Significant Labs: All pertinent labs within the past 24 hours have been reviewed.    Significant Imaging: I have reviewed all pertinent imaging results/findings within the past 24 hours.      Assessment/Plan:      * Hypoglycemia  Likely medication and polypharmacy induced.  Stopping sul furea indefinitely and changing agents based on his renal disease.    POCT Glucose   Date Value Ref Range Status   08/28/2022 178 (H) 70 - 110 mg/dL Final   08/27/2022 188 (H) 70 - 110 mg/dL Final   08/27/2022 161 (H) 70 - 110 mg/dL Final   08/27/2022 190 (H) 70 - 110 mg/dL Final   08/27/2022 122 (H) 70 - 110 mg/dL Final   08/26/2022 139 (H) 70 - 110 mg/dL Final   08/26/2022 283 (H) 70 - 110 mg/dL Final   08/26/2022 304 (H) 70 - 110 mg/dL Final   08/26/2022 274 (H) 70 - 110 mg/dL Final   08/26/2022 308 (H) 70 - 110 mg/dL Final   08/26/2022 327 (H) 70 - 110 mg/dL Final   08/26/2022 403 (H) 70 - 110 mg/dL Final   08/25/2022 412 (H) 70 - 110 mg/dL Final   08/25/2022 311 (H) 70 - 110 mg/dL Final   08/25/2022 176 (H) 70 - 110 mg/dL Final   08/25/2022 137 (H) 70 - 110 mg/dL Final   08/25/2022 106 70 - 110 mg/dL Final   08/25/2022 48 (LL) 70 - 110 mg/dL Final   08/25/2022 45 (LL) 70 - 110 mg/dL Final   08/25/2022 <20 (L) 70 - 110 mg/dL Final   08/25/2022 <20 (L) 70 - 110 mg/dL Final   08/25/2022 51 (L) 70 - 110 mg/dL Final            MDD (major depressive disorder), recurrent episode, moderate  Patient well  controlled with current medical therapy        Benzodiazepine + opiate dependence        Burst fracture of T12 vertebra        Chronic HFrEF (heart failure with reduced ejection fraction) ICM NYHA2 EF 30%  Appears euvolemic and well compensated.      Transaminasemia        Vascular dementia with delirium  Patient appears at baseline.      Decompensated cirrhosis of liver with hepatic encephalopathy without ascites        Hepatic encephalopathy  Continue lactulose.      CAD (coronary artery disease)  No chest pain currently      Class 1 obesity due to excess calories with serious comorbidity and body mass index (BMI) of 32.0 to 32.9 in adult  Body mass index is 29.68 kg/m². Morbid obesity complicates all aspects of disease management from diagnostic modalities to treatment. Weight loss encouraged and health benefits explained to patient.         CKD (chronic kidney disease), stage IV  This is complicated by acute on chronic injury secondary to hypoglycemia.  The patient is tolerating p.o..  Feel as though he is stable from a renal perspective just needs close outpatient follow-up.    Lab Results   Component Value Date    CREATININE 3.6 (H) 08/27/2022    CREATININE 3.5 (H) 08/26/2022    CREATININE 3.3 (H) 08/25/2022              VTE Risk Mitigation (From admission, onward)         Ordered     IP VTE HIGH RISK PATIENT  Once         08/25/22 2037     Place sequential compression device  Until discontinued         08/25/22 2037                Discharge Planning   DIANN: 8/27/2022     Code Status: Full Code   Is the patient medically ready for discharge?:     Reason for patient still in hospital (select all that apply): Treatment  Discharge Plan A: Skilled Nursing Facility, Return to nursing home   Discharge Delays: (!) Other (transportation to nursing home)              Jose Espinoza Jr, MD  Department of Hospital Medicine   [facility] Surg

## 2022-08-28 NOTE — ASSESSMENT & PLAN NOTE
Likely medication and polypharmacy induced.  Stopping sul furea indefinitely and changing agents based on his renal disease.    POCT Glucose   Date Value Ref Range Status   08/28/2022 178 (H) 70 - 110 mg/dL Final   08/27/2022 188 (H) 70 - 110 mg/dL Final   08/27/2022 161 (H) 70 - 110 mg/dL Final   08/27/2022 190 (H) 70 - 110 mg/dL Final   08/27/2022 122 (H) 70 - 110 mg/dL Final   08/26/2022 139 (H) 70 - 110 mg/dL Final   08/26/2022 283 (H) 70 - 110 mg/dL Final   08/26/2022 304 (H) 70 - 110 mg/dL Final   08/26/2022 274 (H) 70 - 110 mg/dL Final   08/26/2022 308 (H) 70 - 110 mg/dL Final   08/26/2022 327 (H) 70 - 110 mg/dL Final   08/26/2022 403 (H) 70 - 110 mg/dL Final   08/25/2022 412 (H) 70 - 110 mg/dL Final   08/25/2022 311 (H) 70 - 110 mg/dL Final   08/25/2022 176 (H) 70 - 110 mg/dL Final   08/25/2022 137 (H) 70 - 110 mg/dL Final   08/25/2022 106 70 - 110 mg/dL Final   08/25/2022 48 (LL) 70 - 110 mg/dL Final   08/25/2022 45 (LL) 70 - 110 mg/dL Final   08/25/2022 <20 (L) 70 - 110 mg/dL Final   08/25/2022 <20 (L) 70 - 110 mg/dL Final   08/25/2022 51 (L) 70 - 110 mg/dL Final

## 2022-08-28 NOTE — PLAN OF CARE
Problem: Fall Injury Risk  Goal: Absence of Fall and Fall-Related Injury  Outcome: Ongoing, Progressing  Intervention: Promote Injury-Free Environment  Flowsheets (Taken 8/28/2022 0235)  Safety Promotion/Fall Prevention:   assistive device/personal item within reach   bed alarm set   high risk medications identified   lighting adjusted   medications reviewed   side rails raised x 2     Problem: Diabetes Comorbidity  Goal: Blood Glucose Level Within Targeted Range  Outcome: Ongoing, Progressing  Intervention: Monitor and Manage Glycemia  Flowsheets (Taken 8/28/2022 0235)  Glycemic Management: blood glucose monitored

## 2022-08-28 NOTE — SUBJECTIVE & OBJECTIVE
Past Medical History:   Diagnosis Date    Anxiety     Arthritis     CHF (congestive heart failure)     Chronic kidney disease     COPD (chronic obstructive pulmonary disease)     Coronary artery disease     Diabetes mellitus     Erectile dysfunction     Hypertension     Necrotizing fasciitis of forearm     Peripheral neuropathy     Stroke     TIA    Thyroid disease        Past Surgical History:   Procedure Laterality Date    arm surgery Right     arthroscopy Right     knee    COLONOSCOPY N/A 7/3/2019    Procedure: COLONOSCOPY;  Surgeon: Jagdish Pollock MD;  Location: [facility] ENDO;  Service: Endoscopy;  Laterality: N/A;    ESOPHAGOGASTRODUODENOSCOPY N/A 7/3/2019    Procedure: ESOPHAGOGASTRODUODENOSCOPY (EGD);  Surgeon: Jagdish Pollock MD;  Location: [facility] ENDO;  Service: Endoscopy;  Laterality: N/A;    EYE SURGERY      FUSION, SPINE, POSTERIOR APPROACH N/A 7/29/2022    Procedure: FUSION,SPINE,POSTERIOR APPROACH, T9-L3;  Surgeon: Luis E Lawson MD;  Location: [facility] OR;  Service: Orthopedics;  Laterality: N/A;    ROTATOR CUFF REPAIR Right     TONSILLECTOMY      VERTEBRAL CORPECTOMY N/A 7/27/2022    Procedure: CORPECTOMY T12;  Surgeon: Luis E Lawson MD;  Location: [facility] OR;  Service: Orthopedics;  Laterality: N/A;       Review of patient's allergies indicates:   Allergen Reactions    Onion     Pork/porcine containing products     Shellfish containing products     Shrimp        No current facility-administered medications on file prior to encounter.     Current Outpatient Medications on File Prior to Encounter   Medication Sig    albuterol-ipratropium (DUO-NEB) 2.5 mg-0.5 mg/3 mL nebulizer solution Take 3 mLs by nebulization every 6 (six) hours while awake. Rescue    aspirin 81 MG Chew Take 1 tablet (81 mg total) by mouth once daily.    atorvastatin (LIPITOR) 40 MG tablet Take 1 tablet (40 mg total) by mouth once daily.    clonazePAM (KLONOPIN) 0.5 MG tablet Take 1 tablet (0.5 mg  total) by mouth 3 (three) times daily. (Patient taking differently: Take 0.5 mg by mouth 2 (two) times daily.)    doxazosin (CARDURA) 2 MG tablet Take 1 tablet (2 mg total) by mouth once daily.    DULoxetine (CYMBALTA) 30 MG capsule Take 1 capsule (30 mg total) by mouth 2 (two) times daily.    ferrous gluconate (FERGON) 324 MG tablet Take 1 tablet (324 mg total) by mouth daily with breakfast.    fluticasone furoate-vilanteroL (BREO) 100-25 mcg/dose diskus inhaler Inhale 1 puff into the lungs once daily. Controller    guaiFENesin (MUCINEX) 600 mg 12 hr tablet Take 1 tablet (600 mg total) by mouth 2 (two) times daily. for 10 days    lactulose (CEPHULAC) 10 gram packet Take 1 packet (10 g total) by mouth 2 (two) times daily.    metoprolol succinate (TOPROL-XL) 25 MG 24 hr tablet Take 1 tablet (25 mg total) by mouth once daily.    nicotine (NICODERM CQ) 14 mg/24 hr Place 1 patch onto the skin once daily.    NIFEdipine (PROCARDIA-XL) 90 MG (OSM) 24 hr tablet Take 1 tablet (90 mg total) by mouth once daily.    pantoprazole (PROTONIX) 40 MG tablet Take 1 tablet (40 mg total) by mouth once daily.    polyethylene glycol (GLYCOLAX) 17 gram PwPk Take 17 g by mouth once daily.    QUEtiapine (SEROQUEL) 50 MG tablet Take 1 tablet (50 mg total) by mouth 2 (two) times daily.    senna-docusate 8.6-50 mg (PERICOLACE) 8.6-50 mg per tablet Take 1 tablet by mouth once daily.    tiotropium bromide (SPIRIVA RESPIMAT) 2.5 mcg/actuation inhaler Inhale 2 puffs into the lungs once daily. Controller    [DISCONTINUED] albuterol (PROVENTIL) 2.5 mg /3 mL (0.083 %) nebulizer solution USE 1 VIAL VIA NEBULIZER EVERY 6 HOURS AS NEEDED 7/9/21    [DISCONTINUED] amLODIPine (NORVASC) 10 MG tablet Take 1 tablet (10 mg total) by mouth once daily.    [DISCONTINUED] furosemide (LASIX) 80 MG tablet Take 1 tablet (80 mg total) by mouth 2 (two) times daily.    [DISCONTINUED] isosorbide-hydrALAZINE 20-37.5 mg (BIDIL) 20-37.5 mg Tab Take 1  tablet by mouth 2 (two) times daily.    [DISCONTINUED] metoprolol tartrate (LOPRESSOR) 50 MG tablet Take 1 tablet (50 mg total) by mouth 2 (two) times daily.    [DISCONTINUED] mirtazapine (REMERON) 30 MG tablet Take 30 mg by mouth every evening.    [DISCONTINUED] omeprazole (PRILOSEC) 40 MG capsule Take 1 capsule (40 mg total) by mouth once daily.    [DISCONTINUED] pioglitazone (ACTOS) 30 MG tablet Take 30 mg by mouth once daily.    [DISCONTINUED] rivastigmine (EXELON) 4.6 mg/24 hour PT24 Place 1 patch onto the skin once daily.    [DISCONTINUED] SITagliptin (JANUVIA) 100 MG Tab Take 100 mg by mouth once daily.    [DISCONTINUED] traZODone (DESYREL) 50 MG tablet Take 50 mg by mouth every evening.     Family History       Problem Relation (Age of Onset)    COPD Mother    Cancer Brother    Diabetes Father          Tobacco Use    Smoking status: Every Day     Packs/day: 1.00     Years: 40.00     Pack years: 40.00     Types: Cigarettes    Smokeless tobacco: Never   Substance and Sexual Activity    Alcohol use: Not Currently    Drug use: Not Currently    Sexual activity: Not on file     Review of Systems   Constitutional:  Positive for activity change, appetite change and fatigue. Negative for chills and fever.   HENT:  Negative for ear pain, mouth sores, nosebleeds and sore throat.    Eyes:  Negative for visual disturbance.   Respiratory:  Negative for cough, shortness of breath and wheezing.    Cardiovascular:  Negative for chest pain, palpitations and leg swelling.   Gastrointestinal:  Positive for constipation. Negative for abdominal distention, abdominal pain, blood in stool, diarrhea, nausea and vomiting.   Endocrine: Negative for polyphagia.   Genitourinary:  Positive for difficulty urinating. Negative for dysuria, flank pain and frequency.   Musculoskeletal:  Positive for back pain. Negative for arthralgias.   Skin:  Negative for rash.   Neurological:  Positive for weakness. Negative for dizziness,  tremors, seizures, syncope, facial asymmetry, speech difficulty and headaches.   Hematological:  Negative for adenopathy.   Psychiatric/Behavioral:  Positive for confusion and sleep disturbance. Negative for agitation and hallucinations. The patient is nervous/anxious.    Objective:     Vital Signs (Most Recent):  Temp: 97.4 °F (36.3 °C) (08/28/22 0825)  Pulse: 80 (08/28/22 0825)  Resp: 20 (08/28/22 0825)  BP: 135/63 (08/28/22 0930)  SpO2: 97 % (08/28/22 0825)   Vital Signs (24h Range):  Temp:  [97.4 °F (36.3 °C)-98.5 °F (36.9 °C)] 97.4 °F (36.3 °C)  Pulse:  [67-80] 80  Resp:  [17-33] 20  SpO2:  [92 %-100 %] 97 %  BP: ()/(53-70) 135/63     Weight: 91.2 kg (201 lb)  Body mass index is 29.68 kg/m².    Physical Exam  Vitals and nursing note reviewed.   Constitutional:       General: He is not in acute distress.     Appearance: He is obese. He is ill-appearing.   HENT:      Head: Normocephalic and atraumatic.      Right Ear: External ear normal.      Left Ear: External ear normal.      Nose: Nose normal. No congestion or rhinorrhea.      Mouth/Throat:      Mouth: Mucous membranes are moist.      Pharynx: No oropharyngeal exudate.   Eyes:      General: No scleral icterus.     Extraocular Movements: Extraocular movements intact.   Neck:      Vascular: No carotid bruit.   Cardiovascular:      Rate and Rhythm: Normal rate and regular rhythm.      Heart sounds: Normal heart sounds. No murmur heard.    No friction rub. No gallop.   Pulmonary:      Effort: Pulmonary effort is normal. No respiratory distress.      Breath sounds: Normal breath sounds. No stridor. No wheezing, rhonchi or rales.   Chest:      Chest wall: No tenderness.   Abdominal:      General: Bowel sounds are normal. There is no distension.      Palpations: Abdomen is soft. There is no mass.      Tenderness: There is no abdominal tenderness. There is no right CVA tenderness, left CVA tenderness, guarding or rebound.      Hernia: No hernia is present.    Musculoskeletal:         General: No swelling, tenderness or deformity.      Cervical back: Neck supple. No rigidity.      Right lower leg: No edema.      Left lower leg: No edema.   Lymphadenopathy:      Cervical: No cervical adenopathy.   Skin:     Capillary Refill: Capillary refill takes less than 2 seconds.      Coloration: Skin is not jaundiced or pale.      Findings: No rash.   Neurological:      Mental Status: He is alert. Mental status is at baseline.      Sensory: No sensory deficit.      Motor: Weakness present.      Coordination: Coordination normal.   Psychiatric:         Mood and Affect: Mood normal.         Behavior: Behavior normal.           Significant Labs: All pertinent labs within the past 24 hours have been reviewed.    Significant Imaging: I have reviewed all pertinent imaging results/findings within the past 24 hours.

## 2022-08-29 VITALS
HEIGHT: 69 IN | RESPIRATION RATE: 20 BRPM | WEIGHT: 201 LBS | HEART RATE: 76 BPM | SYSTOLIC BLOOD PRESSURE: 136 MMHG | BODY MASS INDEX: 29.77 KG/M2 | TEMPERATURE: 98 F | DIASTOLIC BLOOD PRESSURE: 64 MMHG | OXYGEN SATURATION: 99 %

## 2022-08-29 LAB
ALBUMIN SERPL BCP-MCNC: 2.5 G/DL (ref 3.5–5.2)
ALP SERPL-CCNC: 102 U/L (ref 55–135)
ALT SERPL W/O P-5'-P-CCNC: 21 U/L (ref 10–44)
ANION GAP SERPL CALC-SCNC: 6 MMOL/L (ref 8–16)
AST SERPL-CCNC: 25 U/L (ref 10–40)
BASOPHILS # BLD AUTO: 0.03 K/UL (ref 0–0.2)
BASOPHILS NFR BLD: 0.6 % (ref 0–1.9)
BILIRUB SERPL-MCNC: 0.4 MG/DL (ref 0.1–1)
BUN SERPL-MCNC: 36 MG/DL (ref 8–23)
CALCIUM SERPL-MCNC: 9 MG/DL (ref 8.7–10.5)
CHLORIDE SERPL-SCNC: 110 MMOL/L (ref 95–110)
CO2 SERPL-SCNC: 24 MMOL/L (ref 23–29)
CREAT SERPL-MCNC: 2.8 MG/DL (ref 0.5–1.4)
DIFFERENTIAL METHOD: ABNORMAL
EOSINOPHIL # BLD AUTO: 0.1 K/UL (ref 0–0.5)
EOSINOPHIL NFR BLD: 2 % (ref 0–8)
ERYTHROCYTE [DISTWIDTH] IN BLOOD BY AUTOMATED COUNT: 15.7 % (ref 11.5–14.5)
EST. GFR  (NO RACE VARIABLE): 24.7 ML/MIN/1.73 M^2
GLUCOSE SERPL-MCNC: 128 MG/DL (ref 70–110)
HCT VFR BLD AUTO: 28 % (ref 40–54)
HGB BLD-MCNC: 8.9 G/DL (ref 14–18)
IMM GRANULOCYTES # BLD AUTO: 0.02 K/UL (ref 0–0.04)
IMM GRANULOCYTES NFR BLD AUTO: 0.4 % (ref 0–0.5)
LYMPHOCYTES # BLD AUTO: 1.4 K/UL (ref 1–4.8)
LYMPHOCYTES NFR BLD: 28.1 % (ref 18–48)
MAGNESIUM SERPL-MCNC: 2.1 MG/DL (ref 1.6–2.6)
MCH RBC QN AUTO: 25.1 PG (ref 27–31)
MCHC RBC AUTO-ENTMCNC: 31.8 G/DL (ref 32–36)
MCV RBC AUTO: 79 FL (ref 82–98)
MONOCYTES # BLD AUTO: 0.4 K/UL (ref 0.3–1)
MONOCYTES NFR BLD: 9 % (ref 4–15)
NEUTROPHILS # BLD AUTO: 2.9 K/UL (ref 1.8–7.7)
NEUTROPHILS NFR BLD: 59.9 % (ref 38–73)
NRBC BLD-RTO: 0 /100 WBC
PLATELET # BLD AUTO: 151 K/UL (ref 150–450)
PMV BLD AUTO: 10.8 FL (ref 9.2–12.9)
POCT GLUCOSE: 198 MG/DL (ref 70–110)
POTASSIUM SERPL-SCNC: 4.7 MMOL/L (ref 3.5–5.1)
PROT SERPL-MCNC: 6.3 G/DL (ref 6–8.4)
RBC # BLD AUTO: 3.54 M/UL (ref 4.6–6.2)
SODIUM SERPL-SCNC: 140 MMOL/L (ref 136–145)
WBC # BLD AUTO: 4.88 K/UL (ref 3.9–12.7)

## 2022-08-29 PROCEDURE — 90833 PSYTX W PT W E/M 30 MIN: CPT | Mod: ,,, | Performed by: PSYCHIATRY & NEUROLOGY

## 2022-08-29 PROCEDURE — 94761 N-INVAS EAR/PLS OXIMETRY MLT: CPT

## 2022-08-29 PROCEDURE — 85025 COMPLETE CBC W/AUTO DIFF WBC: CPT | Performed by: INTERNAL MEDICINE

## 2022-08-29 PROCEDURE — 25000242 PHARM REV CODE 250 ALT 637 W/ HCPCS: Performed by: INTERNAL MEDICINE

## 2022-08-29 PROCEDURE — 99900031 HC PATIENT EDUCATION (STAT)

## 2022-08-29 PROCEDURE — 25000003 PHARM REV CODE 250: Performed by: INTERNAL MEDICINE

## 2022-08-29 PROCEDURE — 80053 COMPREHEN METABOLIC PANEL: CPT | Performed by: INTERNAL MEDICINE

## 2022-08-29 PROCEDURE — 99223 1ST HOSP IP/OBS HIGH 75: CPT | Mod: ,,, | Performed by: PSYCHIATRY & NEUROLOGY

## 2022-08-29 PROCEDURE — 83735 ASSAY OF MAGNESIUM: CPT | Performed by: INTERNAL MEDICINE

## 2022-08-29 PROCEDURE — 36415 COLL VENOUS BLD VENIPUNCTURE: CPT | Performed by: INTERNAL MEDICINE

## 2022-08-29 PROCEDURE — S4991 NICOTINE PATCH NONLEGEND: HCPCS | Performed by: INTERNAL MEDICINE

## 2022-08-29 PROCEDURE — 99223 PR INITIAL HOSPITAL CARE,LEVL III: ICD-10-PCS | Mod: ,,, | Performed by: PSYCHIATRY & NEUROLOGY

## 2022-08-29 PROCEDURE — 90833 PR PSYCHOTHERAPY W/PATIENT W/E&M, 30 MIN (ADD ON): ICD-10-PCS | Mod: ,,, | Performed by: PSYCHIATRY & NEUROLOGY

## 2022-08-29 PROCEDURE — 94640 AIRWAY INHALATION TREATMENT: CPT

## 2022-08-29 PROCEDURE — 99900035 HC TECH TIME PER 15 MIN (STAT)

## 2022-08-29 RX ORDER — FLUVOXAMINE MALEATE 25 MG/1
25 TABLET ORAL NIGHTLY
Status: DISCONTINUED | OUTPATIENT
Start: 2022-08-29 | End: 2022-08-29 | Stop reason: HOSPADM

## 2022-08-29 RX ORDER — FLUVOXAMINE MALEATE 25 MG/1
25 TABLET ORAL NIGHTLY
Qty: 90 TABLET | Refills: 1 | Status: ON HOLD
Start: 2022-08-29 | End: 2023-01-24 | Stop reason: HOSPADM

## 2022-08-29 RX ADMIN — SITAGLIPTIN 100 MG: 100 TABLET, FILM COATED ORAL at 09:08

## 2022-08-29 RX ADMIN — FERROUS GLUCONATE TAB 324 MG (37.5 MG ELEMENTAL IRON) 324 MG: 324 (37.5 FE) TAB at 09:08

## 2022-08-29 RX ADMIN — NICOTINE 1 PATCH: 14 PATCH, EXTENDED RELEASE TRANSDERMAL at 09:08

## 2022-08-29 RX ADMIN — TAMSULOSIN HYDROCHLORIDE 0.4 MG: 0.4 CAPSULE ORAL at 09:08

## 2022-08-29 RX ADMIN — PANTOPRAZOLE SODIUM 40 MG: 40 TABLET, DELAYED RELEASE ORAL at 09:08

## 2022-08-29 RX ADMIN — IPRATROPIUM BROMIDE AND ALBUTEROL SULFATE 3 ML: 2.5; .5 SOLUTION RESPIRATORY (INHALATION) at 07:08

## 2022-08-29 RX ADMIN — LACTULOSE 10 G: 20 SOLUTION ORAL at 09:08

## 2022-08-29 RX ADMIN — ASPIRIN 81 MG CHEWABLE TABLET 81 MG: 81 TABLET CHEWABLE at 09:08

## 2022-08-29 RX ADMIN — METOPROLOL SUCCINATE 25 MG: 25 TABLET, EXTENDED RELEASE ORAL at 09:08

## 2022-08-29 RX ADMIN — NIFEDIPINE 90 MG: 30 TABLET, FILM COATED, EXTENDED RELEASE ORAL at 09:08

## 2022-08-29 RX ADMIN — BUDESONIDE 0.5 MG: 0.5 INHALANT RESPIRATORY (INHALATION) at 07:08

## 2022-08-29 RX ADMIN — MUPIROCIN: 20 OINTMENT TOPICAL at 09:08

## 2022-08-29 RX ADMIN — IPRATROPIUM BROMIDE AND ALBUTEROL SULFATE 3 ML: 2.5; .5 SOLUTION RESPIRATORY (INHALATION) at 01:08

## 2022-08-29 RX ADMIN — ATORVASTATIN CALCIUM 40 MG: 40 TABLET, FILM COATED ORAL at 09:08

## 2022-08-29 RX ADMIN — ARFORMOTEROL TARTRATE 15 MCG: 15 SOLUTION RESPIRATORY (INHALATION) at 07:08

## 2022-08-29 RX ADMIN — LEVOTHYROXINE SODIUM 50 MCG: 0.05 TABLET ORAL at 09:08

## 2022-08-29 RX ADMIN — DOXAZOSIN 2 MG: 2 TABLET ORAL at 09:08

## 2022-08-29 RX ADMIN — DULOXETINE 30 MG: 30 CAPSULE, DELAYED RELEASE ORAL at 09:08

## 2022-08-29 NOTE — CONSULTS
[facility]  Psychiatry  Consult Note      Consultation from Psychiatry started: 8/29/2022 at 11:51 AM  The chief complaint leading to psychiatric consultation is: Hypoglycemia (Pt was seen in ED this morning. Was discharged and states he was not given anything to eat once back at nursing home. Nursing home reports low cbg. EMS reports cbg 36. 20G to left hand with D5W running. /)    This consultation was requested by   The location of the consulting psychiatric nurse practitioner is [place], [place]  The patient location is [facility] MEDICAL SURGICAL UNIT    Also present with the patient at the time of the consultation: RN    Patient Identification:   Pam Gray is a 62 y.o. male.    Patient information was obtained from patient.  Patient presented voluntarily to the Emergency Department.    Inpatient consult to Psychiatry  Consult performed by: Patrick Morse NP  Consult ordered by: Bhanu Davila III, MD       Subjective:     History of Present Illness:    Per family medicine:       HPI:  Patient is a 62-year-old male with a history of CVA dementia hypertension hyperlipidemia diabetes necrotizing fasciitis of the arm chronic kidney disease who was recently admitted to a local nursing home facility post back surgery.  The patient states he had back surgery several weeks ago and is in rehabilitative care.  Patient states he has been doing well and he began having drops in his blood sugar and was brought into our local ER.  He was initially discharged and then returned with blood sugars in the low 20s.  The patient has a history of CKD stage 3 to for and is currently on Amaryl and multiple other hypoglycemics.  Patient was admitted started on a D10 drip his blood sugars are now 3-400 and he is awake and eating breakfast this morning.  The patient has polypharmacy we reviewed his medications he does have some dementia.       Past Medical History:   Diagnosis Date    Anxiety     Arthritis     CHF  "(congestive heart failure)     Chronic kidney disease     COPD (chronic obstructive pulmonary disease)     Coronary artery disease     Diabetes mellitus     Erectile dysfunction     Hypertension     Necrotizing fasciitis of forearm     Peripheral neuropathy     Stroke     TIA    Thyroid disease        Psychiatric History:   Previous Psychiatric Hospitalizations: 15 years ago, in [place] "I don't remember"  Previous Medication Trials:  Yes, but unsure which ones  Previous Suicide Attempts: "About 15 years ago I ate 50 valiums and drank a bunch of bourbon."    History of Violence: Denies  History of Depression: Yes  History of Diana: Denies  History of Auditory/Visual Hallucination: Reports questionable history of auditory and visual hallucinations which he says "Stopped when I got into god"  History of Delusions: No  Outpatient psychiatrist (current & past): Yes    Neurologic History:  Seizures: Yes-Drug induced   Head trauma: No    Substance Abuse History:  Tobacco: Previous smoker, unsure when   Alcohol:Reports previous heavy alcohol consumption, however states he has not had a drink in 7-8 months  Illicit Substances: Yes-Cocaine, heroin, PCP  Detox/Rehab: Yes    Legal History: Past charges/incarcerations: None at this time, reports past arrests due to substances     Family History:   Family History   Problem Relation Age of Onset    COPD Mother     Diabetes Father     Cancer Brother        Social History:  Developmental/Childhood: Achieved all developmental milestones timely  Education:  Some college  Employment Status/Finances: Disabled   Relationship Status/Sexual Orientation:    Children: 5---3 biological and two step children  Housing Status: Living at home with step son    history: NO  Access to gun: NO  Islam: Actively participates in organized [employer]  Recreational activities:  "Nothing"    Psychiatric Mental Status Exam:  Arousal: alert  Sensorium/Orientation: oriented to person, " "place, situation  Behavior/Cooperation: normal, cooperative   Speech: loud, slurred d/t poor dentition  Language: grossly intact  Mood: "Depressed"   Affect: depressed and anxious  Thought Process: tangential  Thought Content:   Auditory hallucinations: NO  Visual hallucinations: NO  Paranoia: NO  Delusions:  NO  Suicidal ideation: Reports vague thoughts of death, sometimes "Feel like I don't have a reason to get up in the morning"  Homicidal ideation: NO  Attention/Concentration: spelled "WORLD" forwards and backwards  Fund of Knowledge: Impaired   Insight: limited awareness of illness  Judgment: behavior is adequate to circumstances    Laboratory Data:   Labs Reviewed   CBC W/ AUTO DIFFERENTIAL - Abnormal; Notable for the following components:       Result Value    RBC 4.15 (*)     Hemoglobin 10.4 (*)     Hematocrit 33.5 (*)     MCV 81 (*)     MCH 25.1 (*)     MCHC 31.0 (*)     RDW 15.7 (*)     Gran # (ANC) 10.8 (*)     Immature Grans (Abs) 0.05 (*)     Lymph # 0.8 (*)     Gran % 85.6 (*)     Lymph % 6.1 (*)     All other components within normal limits   COMPREHENSIVE METABOLIC PANEL - Abnormal; Notable for the following components:    Glucose 17 (*)     BUN 37 (*)     Creatinine 3.3 (*)     Albumin 2.8 (*)     eGFR 20.3 (*)     All other components within normal limits    Narrative:     GLU   critical result(s) called and verbal readback obtained from   SARAN Harris by [facility] 08/25/2022 16:24   POCT GLUCOSE - Abnormal; Notable for the following components:    POCT Glucose 51 (*)     All other components within normal limits   POCT GLUCOSE - Abnormal; Notable for the following components:    POCT Glucose <20 (*)     All other components within normal limits   POCT GLUCOSE - Abnormal; Notable for the following components:    POCT Glucose <20 (*)     All other components within normal limits   POCT GLUCOSE - Abnormal; Notable for the following components:    POCT Glucose 45 (*)     All other components within normal " "limits   POCT GLUCOSE - Abnormal; Notable for the following components:    POCT Glucose 48 (*)     All other components within normal limits   POCT GLUCOSE - Abnormal; Notable for the following components:    POCT Glucose 137 (*)     All other components within normal limits   MAGNESIUM   TROPONIN I HIGH SENSITIVITY   POCT GLUCOSE        Allergies:  Onion, Pork/porcine containing products, Shellfish containing products, and Shrimp     Medications:  Scheduled Meds:   albuterol-ipratropium  3 mL Nebulization Q6H WAKE    arformoteroL  15 mcg Nebulization BID    aspirin  81 mg Oral Daily    atorvastatin  40 mg Oral Daily    budesonide  0.5 mg Nebulization Q12H    doxazosin  2 mg Oral Daily    DULoxetine  30 mg Oral Daily    ferrous gluconate  324 mg Oral Daily with breakfast    lactulose  10 g Oral BID    levothyroxine  50 mcg Oral Daily    metoprolol succinate  25 mg Oral Daily    mupirocin   Nasal BID    nicotine  1 patch Transdermal Daily    NIFEdipine  90 mg Oral Daily    pantoprazole  40 mg Oral Daily    polyethylene glycol  17 g Oral Daily    QUEtiapine  50 mg Oral Nightly    rivastigmine  1 patch Transdermal Daily    SITagliptin  100 mg Oral Daily    tamsulosin  0.4 mg Oral Daily     Continuous Infusions:  PRN Meds:.acetaminophen, clonazePAM, dextrose 10%, dextrose 10%, glucagon (human recombinant), glucose, glucose, insulin aspart U-100, melatonin, ondansetron, sodium chloride 0.9%    Assessment - Diagnosis - Goals:     Diagnosis/Impression:     Patient seen today for psychiatric consult prior to discharge to skilled nursing facility. Patient reports extensive psychiatric and substance abuse history, including multiple hospitalizations (last one was approximately 15 years ago, possibly related to depression/suicide attempt), medication trials, and arrests. Reports long history of alcohol abuse, states last drink was "7 or 8 months ago" as well as abuse of "every drug" including cocaine, heroin, marijuana, and " "PCP.     Medical history includes CHF, COPD, hepatic encephalopathy, hypoglycemia, TIA, kidney disease, and a reported history of dementia. Current hospitalization due to hypoglycemia.     Patient is noted to be laying in bed during assessment. He is awake, alert but lethargic, oriented to person, place, time, and situation. Patient is confused about his age, stating that he is "52", however he is able to state his birth date. Memory appears largely intact at this time. He is able to recall 3/3 objects immediately and again approximately 5 minutes later. He is unable to recall details related to past psychiatric diagnoses/medication trials. He does report "multiple" psychiatric hospitalizations, with the last one being "about 15 years ago in [place]," however he states he does not remember the reason. A moment later, patient reports a suicide attempt, also approximately 15 years ago, via intentional overdose on valium and alcohol, but states that this was not the reason for hospitalization. Patient reports extensive history of substance abuse including cocaine, heroin, PCP, and alcohol. Reports last drug use was "years ago" (tox screen from 4 months ago positive for cocaine and amphetamines) and that last drink was 7-8 months ago.     Patient is often tearful throughout assessment. He reports that his wife of 10 years  approximately 3 weeks ago, adding that he was "not able to bury her or say goodbye" due to being hospitalized for back problems. While he denies any specific suicidal ideation or intent (states that his Muslim prevents him from committing suicide and that "I wouldn't do that to my kids), he does report that he has been experiencing hopelessness and thoughts that it would be "easier to just not wake up" since the death of his wife. He reports that he has a fairly good relationship with his two step-children, one of which he was living with recently, but that he does not often speak to his 3 " "biological children. Reports history of "anxiety problems" but states does not think this is a problem anymore. Reports intermittent problems with sleep maintenance that have been worse since his wife's death.     Denies any past symptoms consistent with anushka. He does endorse history of auditory/visual hallucinations, however he is unable to provide additional detail, specifically if these were experienced while intoxicated/in withdrawal. Reports that hallucinations resolved "When I started getting involved with God."    Patient is able to state reason for current hospitalization ("I was having all kinds of problems with my blood sugar.") He does report that while he has felt hopeless lately, he is looking forward to going to skilled nursing "so I can learn to use a walker and start moving around again."     Patient states several times during assessment that he feels most of his current depression is related to grief over the death of his wife. Prior to her death, reports that mood had been "pretty good for the most part." He is unsure what psychotropics he was taking at home, noting "my wife took care of all of that", chart indicates patient was previously taking cymbalta and seroquel.     Rec:      Depression:  -Patient counseled  -Recommend outpatient therapy/grief counseling  -Discontinue cymbalta d/t hepatic impairment  -Initiate luvox 25 mg qhs  -Continue seroquel 50 mg      From psychiatric standpoint, patient is cleared for discharge to SNF.     Time with patient: 60      More than 50% of the time was spent counseling/coordinating care    Consulting clinician was informed of the encounter and consult note.      Patrick Morse NP  Psychiatry  [facility]    "

## 2022-08-29 NOTE — NURSING
Try calling report at 1029, nursing home stated had to send a PA  off and was waiting for answer back

## 2022-08-30 LAB — POCT GLUCOSE: 129 MG/DL (ref 70–110)

## 2022-09-14 ENCOUNTER — HOSPITAL ENCOUNTER (INPATIENT)
Facility: HOSPITAL | Age: 62
LOS: 2 days | Discharge: HOME | DRG: 871 | End: 2022-09-16
Attending: EMERGENCY MEDICINE | Admitting: EMERGENCY MEDICINE
Payer: MEDICARE

## 2022-09-14 DIAGNOSIS — Z79.4 TYPE 2 DIABETES MELLITUS WITH DIABETIC NEUROPATHY, WITH LONG-TERM CURRENT USE OF INSULIN: ICD-10-CM

## 2022-09-14 DIAGNOSIS — E11.40 TYPE 2 DIABETES MELLITUS WITH DIABETIC NEUROPATHY, WITH LONG-TERM CURRENT USE OF INSULIN: ICD-10-CM

## 2022-09-14 DIAGNOSIS — A41.9 SEPSIS: ICD-10-CM

## 2022-09-14 DIAGNOSIS — J10.1 INFLUENZA B: Primary | ICD-10-CM

## 2022-09-14 DIAGNOSIS — R50.9 FEVER: ICD-10-CM

## 2022-09-14 DIAGNOSIS — A41.9 SEPSIS, DUE TO UNSPECIFIED ORGANISM, UNSPECIFIED WHETHER ACUTE ORGAN DYSFUNCTION PRESENT: ICD-10-CM

## 2022-09-14 LAB
ALBUMIN SERPL BCP-MCNC: 2.3 G/DL (ref 3.5–5.2)
ALP SERPL-CCNC: 73 U/L (ref 55–135)
ALT SERPL W/O P-5'-P-CCNC: 28 U/L (ref 10–44)
ANION GAP SERPL CALC-SCNC: 7 MMOL/L (ref 8–16)
ANISOCYTOSIS BLD QL SMEAR: SLIGHT
AST SERPL-CCNC: 19 U/L (ref 10–40)
BACTERIA #/AREA URNS HPF: NEGATIVE /HPF
BASOPHILS # BLD AUTO: 0.02 K/UL (ref 0–0.2)
BASOPHILS NFR BLD: 0.3 % (ref 0–1.9)
BILIRUB SERPL-MCNC: 0.7 MG/DL (ref 0.1–1)
BILIRUB UR QL STRIP: NEGATIVE
BUN SERPL-MCNC: 37 MG/DL (ref 8–23)
CALCIUM SERPL-MCNC: 8.4 MG/DL (ref 8.7–10.5)
CHLORIDE SERPL-SCNC: 112 MMOL/L (ref 95–110)
CLARITY UR: ABNORMAL
CO2 SERPL-SCNC: 18 MMOL/L (ref 23–29)
COLOR UR: YELLOW
CORRECTED TEMPERATURE (PCO2): 25.8 MMHG
CORRECTED TEMPERATURE (PH): 7.41
CORRECTED TEMPERATURE (PO2): 58.8 MMHG
CREAT SERPL-MCNC: 2.3 MG/DL (ref 0.5–1.4)
CTP QC/QA: YES
CTP QC/QA: YES
DIFFERENTIAL METHOD: ABNORMAL
DOHLE BOD BLD QL SMEAR: PRESENT
EOSINOPHIL # BLD AUTO: 0 K/UL (ref 0–0.5)
EOSINOPHIL NFR BLD: 0 % (ref 0–8)
ERYTHROCYTE [DISTWIDTH] IN BLOOD BY AUTOMATED COUNT: 17.6 % (ref 11.5–14.5)
EST. GFR  (NO RACE VARIABLE): 31.3 ML/MIN/1.73 M^2
ESTIMATED AVG GLUCOSE: 128 MG/DL (ref 68–131)
FIO2: 36 %
GLUCOSE SERPL-MCNC: 143 MG/DL (ref 70–110)
GLUCOSE UR QL STRIP: NEGATIVE
HBA1C MFR BLD: 6.1 % (ref 4–5.6)
HCT VFR BLD AUTO: 26.4 % (ref 40–54)
HGB BLD-MCNC: 8.5 G/DL (ref 14–18)
HGB UR QL STRIP: NEGATIVE
HYALINE CASTS #/AREA URNS LPF: 3.5 /LPF
HYPOCHROMIA BLD QL SMEAR: ABNORMAL
IMM GRANULOCYTES # BLD AUTO: 0.04 K/UL (ref 0–0.04)
IMM GRANULOCYTES NFR BLD AUTO: 0.5 % (ref 0–0.5)
KETONES UR QL STRIP: NEGATIVE
LACTATE SERPL-SCNC: 1.6 MMOL/L (ref 0.5–2.2)
LACTATE SERPL-SCNC: 1.6 MMOL/L (ref 0.5–2.2)
LEUKOCYTE ESTERASE UR QL STRIP: NEGATIVE
LPM: 4
LYMPHOCYTES # BLD AUTO: 0.8 K/UL (ref 1–4.8)
LYMPHOCYTES NFR BLD: 9.8 % (ref 18–48)
Lab: ABNORMAL
MCH RBC QN AUTO: 25.8 PG (ref 27–31)
MCHC RBC AUTO-ENTMCNC: 32.2 G/DL (ref 32–36)
MCV RBC AUTO: 80 FL (ref 82–98)
MICROSCOPIC COMMENT: ABNORMAL
MODIFIED ALLEN'S TEST: POSITIVE
MONOCYTES # BLD AUTO: 1 K/UL (ref 0.3–1)
MONOCYTES NFR BLD: 13.3 % (ref 4–15)
NEUTROPHILS # BLD AUTO: 5.9 K/UL (ref 1.8–7.7)
NEUTROPHILS NFR BLD: 76.1 % (ref 38–73)
NITRITE UR QL STRIP: NEGATIVE
NOTIFIED BY: ABNORMAL
NRBC BLD-RTO: 0 /100 WBC
NT-PROBNP SERPL-MCNC: ABNORMAL PG/ML (ref 5–900)
O2DEVICE: ABNORMAL
OVALOCYTES BLD QL SMEAR: ABNORMAL
PCO2 BLDA: 25.8 MMHG (ref 35–45)
PH SMN: 7.41 [PH] (ref 7.34–7.45)
PH UR STRIP: 5 [PH] (ref 5–8)
PLATELET # BLD AUTO: 108 K/UL (ref 150–450)
PLATELET BLD QL SMEAR: ABNORMAL
PMV BLD AUTO: 10.6 FL (ref 9.2–12.9)
PO2 BLDA: 58.8 MMHG (ref 80–100)
POC BASE DEFICIT: -8.3 MMOL/L
POC HCO3: 18.5 MMOL/L
POC MOLECULAR INFLUENZA A AGN: NEGATIVE
POC MOLECULAR INFLUENZA B AGN: POSITIVE
POC PERFORMED BY: ABNORMAL
POC SATURATED O2: 93.9 %
POC TCO2: 34.5 ML/DL
POC TEMPERATURE: 37 C
POCT GLUCOSE: 278 MG/DL (ref 70–110)
POIKILOCYTOSIS BLD QL SMEAR: SLIGHT
POTASSIUM SERPL-SCNC: 3.8 MMOL/L (ref 3.5–5.1)
PROT SERPL-MCNC: 5.9 G/DL (ref 6–8.4)
PROT UR QL STRIP: ABNORMAL
PROVIDER NOTIFIED: ABNORMAL
RBC # BLD AUTO: 3.3 M/UL (ref 4.6–6.2)
RBC #/AREA URNS HPF: 4 /HPF (ref 0–4)
SARS-COV-2 RDRP RESP QL NAA+PROBE: NEGATIVE
SODIUM SERPL-SCNC: 137 MMOL/L (ref 136–145)
SP GR UR STRIP: 1.02 (ref 1–1.03)
SPECIMEN SOURCE: ABNORMAL
SPHEROCYTES BLD QL SMEAR: ABNORMAL
SQUAMOUS #/AREA URNS HPF: 2 /HPF
STOMATOCYTES BLD QL SMEAR: PRESENT
TOXIC GRANULES BLD QL SMEAR: PRESENT
URN SPEC COLLECT METH UR: ABNORMAL
UROBILINOGEN UR STRIP-ACNC: 1 EU/DL
WBC # BLD AUTO: 7.72 K/UL (ref 3.9–12.7)
WBC #/AREA URNS HPF: 3 /HPF (ref 0–5)
YEAST URNS QL MICRO: ABNORMAL

## 2022-09-14 PROCEDURE — 82803 BLOOD GASES ANY COMBINATION: CPT

## 2022-09-14 PROCEDURE — 93010 EKG 12-LEAD: ICD-10-PCS | Mod: ,,, | Performed by: INTERNAL MEDICINE

## 2022-09-14 PROCEDURE — 99291 CRITICAL CARE FIRST HOUR: CPT | Mod: 25

## 2022-09-14 PROCEDURE — 81000 URINALYSIS NONAUTO W/SCOPE: CPT | Performed by: EMERGENCY MEDICINE

## 2022-09-14 PROCEDURE — 36415 COLL VENOUS BLD VENIPUNCTURE: CPT | Performed by: EMERGENCY MEDICINE

## 2022-09-14 PROCEDURE — 87040 BLOOD CULTURE FOR BACTERIA: CPT | Mod: 59 | Performed by: EMERGENCY MEDICINE

## 2022-09-14 PROCEDURE — 99900031 HC PATIENT EDUCATION (STAT)

## 2022-09-14 PROCEDURE — 99900035 HC TECH TIME PER 15 MIN (STAT)

## 2022-09-14 PROCEDURE — U0002 COVID-19 LAB TEST NON-CDC: HCPCS | Performed by: EMERGENCY MEDICINE

## 2022-09-14 PROCEDURE — 83880 ASSAY OF NATRIURETIC PEPTIDE: CPT | Performed by: EMERGENCY MEDICINE

## 2022-09-14 PROCEDURE — 25000003 PHARM REV CODE 250: Performed by: EMERGENCY MEDICINE

## 2022-09-14 PROCEDURE — 93010 ELECTROCARDIOGRAM REPORT: CPT | Mod: ,,, | Performed by: INTERNAL MEDICINE

## 2022-09-14 PROCEDURE — 94761 N-INVAS EAR/PLS OXIMETRY MLT: CPT

## 2022-09-14 PROCEDURE — 36600 WITHDRAWAL OF ARTERIAL BLOOD: CPT

## 2022-09-14 PROCEDURE — 63600175 PHARM REV CODE 636 W HCPCS: Performed by: EMERGENCY MEDICINE

## 2022-09-14 PROCEDURE — 11000001 HC ACUTE MED/SURG PRIVATE ROOM

## 2022-09-14 PROCEDURE — 83036 HEMOGLOBIN GLYCOSYLATED A1C: CPT | Performed by: EMERGENCY MEDICINE

## 2022-09-14 PROCEDURE — 87502 INFLUENZA DNA AMP PROBE: CPT

## 2022-09-14 PROCEDURE — 80053 COMPREHEN METABOLIC PANEL: CPT | Performed by: EMERGENCY MEDICINE

## 2022-09-14 PROCEDURE — 27000221 HC OXYGEN, UP TO 24 HOURS

## 2022-09-14 PROCEDURE — 93005 ELECTROCARDIOGRAM TRACING: CPT

## 2022-09-14 PROCEDURE — 85025 COMPLETE CBC W/AUTO DIFF WBC: CPT | Performed by: EMERGENCY MEDICINE

## 2022-09-14 PROCEDURE — 83605 ASSAY OF LACTIC ACID: CPT | Mod: 91 | Performed by: EMERGENCY MEDICINE

## 2022-09-14 RX ORDER — ONDANSETRON 2 MG/ML
4 INJECTION INTRAMUSCULAR; INTRAVENOUS EVERY 8 HOURS PRN
Status: DISCONTINUED | OUTPATIENT
Start: 2022-09-14 | End: 2022-09-16 | Stop reason: HOSPADM

## 2022-09-14 RX ORDER — VANCOMYCIN 1.75 GRAM/500 ML IN 0.9 % SODIUM CHLORIDE INTRAVENOUS
20
Status: COMPLETED | OUTPATIENT
Start: 2022-09-14 | End: 2022-09-14

## 2022-09-14 RX ORDER — LEVOTHYROXINE SODIUM 50 UG/1
50 TABLET ORAL DAILY
Status: DISCONTINUED | OUTPATIENT
Start: 2022-09-15 | End: 2022-09-16 | Stop reason: HOSPADM

## 2022-09-14 RX ORDER — ATORVASTATIN CALCIUM 40 MG/1
40 TABLET, FILM COATED ORAL DAILY
Status: DISCONTINUED | OUTPATIENT
Start: 2022-09-15 | End: 2022-09-16 | Stop reason: HOSPADM

## 2022-09-14 RX ORDER — IPRATROPIUM BROMIDE AND ALBUTEROL SULFATE 2.5; .5 MG/3ML; MG/3ML
3 SOLUTION RESPIRATORY (INHALATION) EVERY 4 HOURS PRN
Status: DISCONTINUED | OUTPATIENT
Start: 2022-09-14 | End: 2022-09-16 | Stop reason: HOSPADM

## 2022-09-14 RX ORDER — IBUPROFEN 200 MG
16 TABLET ORAL
Status: DISCONTINUED | OUTPATIENT
Start: 2022-09-14 | End: 2022-09-16 | Stop reason: HOSPADM

## 2022-09-14 RX ORDER — INSULIN ASPART 100 [IU]/ML
0-5 INJECTION, SOLUTION INTRAVENOUS; SUBCUTANEOUS
Status: DISCONTINUED | OUTPATIENT
Start: 2022-09-14 | End: 2022-09-16 | Stop reason: HOSPADM

## 2022-09-14 RX ORDER — SODIUM CHLORIDE 0.9 % (FLUSH) 0.9 %
10 SYRINGE (ML) INJECTION
Status: DISCONTINUED | OUTPATIENT
Start: 2022-09-14 | End: 2022-09-16 | Stop reason: HOSPADM

## 2022-09-14 RX ORDER — OXYCODONE AND ACETAMINOPHEN 5; 325 MG/1; MG/1
1 TABLET ORAL EVERY 8 HOURS PRN
Status: DISCONTINUED | OUTPATIENT
Start: 2022-09-14 | End: 2022-09-16 | Stop reason: HOSPADM

## 2022-09-14 RX ORDER — SODIUM CHLORIDE 9 MG/ML
INJECTION, SOLUTION INTRAVENOUS CONTINUOUS
Status: DISCONTINUED | OUTPATIENT
Start: 2022-09-14 | End: 2022-09-15

## 2022-09-14 RX ORDER — OSELTAMIVIR PHOSPHATE 75 MG/1
75 CAPSULE ORAL
Status: COMPLETED | OUTPATIENT
Start: 2022-09-14 | End: 2022-09-14

## 2022-09-14 RX ORDER — ACETAMINOPHEN 325 MG/1
650 TABLET ORAL EVERY 8 HOURS PRN
Status: DISCONTINUED | OUTPATIENT
Start: 2022-09-14 | End: 2022-09-16 | Stop reason: HOSPADM

## 2022-09-14 RX ORDER — GLUCAGON 1 MG
1 KIT INJECTION
Status: DISCONTINUED | OUTPATIENT
Start: 2022-09-14 | End: 2022-09-16 | Stop reason: HOSPADM

## 2022-09-14 RX ORDER — IBUPROFEN 800 MG/1
800 TABLET ORAL
Status: COMPLETED | OUTPATIENT
Start: 2022-09-14 | End: 2022-09-14

## 2022-09-14 RX ORDER — METHYLPREDNISOLONE SOD SUCC 125 MG
125 VIAL (EA) INJECTION EVERY 6 HOURS
Status: DISCONTINUED | OUTPATIENT
Start: 2022-09-14 | End: 2022-09-16 | Stop reason: HOSPADM

## 2022-09-14 RX ORDER — VANCOMYCIN HCL IN 5 % DEXTROSE 1G/250ML
1000 PLASTIC BAG, INJECTION (ML) INTRAVENOUS
Status: DISCONTINUED | OUTPATIENT
Start: 2022-09-15 | End: 2022-09-16 | Stop reason: HOSPADM

## 2022-09-14 RX ORDER — IBUPROFEN 200 MG
24 TABLET ORAL
Status: DISCONTINUED | OUTPATIENT
Start: 2022-09-14 | End: 2022-09-16 | Stop reason: HOSPADM

## 2022-09-14 RX ORDER — FAMOTIDINE 20 MG/1
20 TABLET, FILM COATED ORAL DAILY
Status: DISCONTINUED | OUTPATIENT
Start: 2022-09-15 | End: 2022-09-16 | Stop reason: HOSPADM

## 2022-09-14 RX ORDER — OSELTAMIVIR PHOSPHATE 6 MG/ML
30 FOR SUSPENSION ORAL 2 TIMES DAILY
Status: DISCONTINUED | OUTPATIENT
Start: 2022-09-15 | End: 2022-09-16 | Stop reason: HOSPADM

## 2022-09-14 RX ADMIN — INSULIN ASPART 1 UNITS: 100 INJECTION, SOLUTION INTRAVENOUS; SUBCUTANEOUS at 10:09

## 2022-09-14 RX ADMIN — OSELTAMIVIR PHOSPHATE 75 MG: 75 CAPSULE ORAL at 04:09

## 2022-09-14 RX ADMIN — PIPERACILLIN AND TAZOBACTAM 4.5 G: 4; .5 INJECTION, POWDER, LYOPHILIZED, FOR SOLUTION INTRAVENOUS; PARENTERAL at 07:09

## 2022-09-14 RX ADMIN — METHYLPREDNISOLONE SODIUM SUCCINATE 125 MG: 125 INJECTION, POWDER, FOR SOLUTION INTRAMUSCULAR; INTRAVENOUS at 06:09

## 2022-09-14 RX ADMIN — SODIUM CHLORIDE: 0.9 INJECTION, SOLUTION INTRAVENOUS at 06:09

## 2022-09-14 RX ADMIN — SODIUM CHLORIDE, SODIUM LACTATE, POTASSIUM CHLORIDE, AND CALCIUM CHLORIDE 2685 ML: .6; .31; .03; .02 INJECTION, SOLUTION INTRAVENOUS at 03:09

## 2022-09-14 RX ADMIN — IBUPROFEN 800 MG: 800 TABLET, FILM COATED ORAL at 03:09

## 2022-09-14 RX ADMIN — Medication 1750 MG: at 04:09

## 2022-09-14 NOTE — ED PROVIDER NOTES
Encounter Date: 9/14/2022       History     Chief Complaint   Patient presents with    Shortness of Breath     EMS reports resident from [facility] with c/o SOB, 102.7 Temp, Co-Vid (-) per staff, low Sats     62-year-old male with history of anxiety, congestive heart failure, chronic kidney disease, COPD, diabetes mellitus, hypertension presents to the emergency department from the nursing home with a fever, mild shortness of breath with low oxygen saturation, low blood pressure that they noticed this morning.  Oxygen saturations 90% here on oxygen.  Temperature is 100.6° here in the emergency department, per nursing home report it was over 102° F earlier this afternoon.  Patient denies any nausea or vomiting.  No dysuria.    Review of patient's allergies indicates:   Allergen Reactions    Onion     Pork/porcine containing products     Shellfish containing products     Shrimp      Past Medical History:   Diagnosis Date    Anxiety     Arthritis     CHF (congestive heart failure)     Chronic kidney disease     COPD (chronic obstructive pulmonary disease)     Coronary artery disease     Diabetes mellitus     Erectile dysfunction     Hypertension     Necrotizing fasciitis of forearm     Peripheral neuropathy     Stroke     TIA    Thyroid disease      Past Surgical History:   Procedure Laterality Date    arm surgery Right     arthroscopy Right     knee    COLONOSCOPY N/A 7/3/2019    Procedure: COLONOSCOPY;  Surgeon: Jagdish Pollock MD;  Location: [facility] ENDO;  Service: Endoscopy;  Laterality: N/A;    ESOPHAGOGASTRODUODENOSCOPY N/A 7/3/2019    Procedure: ESOPHAGOGASTRODUODENOSCOPY (EGD);  Surgeon: Jagdish Pollock MD;  Location: [facility] ENDO;  Service: Endoscopy;  Laterality: N/A;    EYE SURGERY      FUSION, SPINE, POSTERIOR APPROACH N/A 7/29/2022    Procedure: FUSION,SPINE,POSTERIOR APPROACH, T9-L3;  Surgeon: Luis E Lawson MD;  Location: [facility] OR;  Service: Orthopedics;  Laterality: N/A;    ROTATOR CUFF REPAIR Right      TONSILLECTOMY      VERTEBRAL CORPECTOMY N/A 7/27/2022    Procedure: CORPECTOMY T12;  Surgeon: Luis E Lawson MD;  Location: [facility] OR;  Service: Orthopedics;  Laterality: N/A;     Family History   Problem Relation Age of Onset    COPD Mother     Diabetes Father     Cancer Brother      Social History     Tobacco Use    Smoking status: Every Day     Packs/day: 1.00     Years: 40.00     Pack years: 40.00     Types: Cigarettes    Smokeless tobacco: Never   Substance Use Topics    Alcohol use: Not Currently    Drug use: Not Currently     Review of Systems   Constitutional:  Positive for chills and fever.   HENT:  Negative for sore throat.    Respiratory:  Positive for cough and shortness of breath.    Cardiovascular:  Negative for chest pain.   Gastrointestinal:  Negative for nausea.   Genitourinary:  Negative for dysuria.   Musculoskeletal:  Negative for back pain.   Skin:  Negative for rash.   Neurological:  Negative for weakness.   Hematological:  Does not bruise/bleed easily.   All other systems reviewed and are negative.    Physical Exam     Initial Vitals [09/14/22 1507]   BP Pulse Resp Temp SpO2   (!) 97/53 82 (!) 28 (!) 100.6 °F (38.1 °C) (!) 90 %      MAP       --         Physical Exam    Nursing note and vitals reviewed.  Constitutional: He appears well-developed and well-nourished. He is not diaphoretic. No distress.   HENT:   Head: Normocephalic and atraumatic.   Eyes: Conjunctivae and EOM are normal. Pupils are equal, round, and reactive to light. Right eye exhibits no discharge. Left eye exhibits no discharge. No scleral icterus.   Neck: Neck supple. No JVD present.   Normal range of motion.  Cardiovascular:  Normal rate, regular rhythm, normal heart sounds and intact distal pulses.           No murmur heard.  Pulmonary/Chest: No stridor. No respiratory distress. He has no wheezes. He has no rhonchi. He has rales. He exhibits no tenderness.   Abdominal: Abdomen is soft. Bowel sounds are normal. He  exhibits no distension and no mass. There is no abdominal tenderness. There is no rebound and no guarding.   Musculoskeletal:         General: No tenderness or edema. Normal range of motion.      Cervical back: Normal range of motion and neck supple.     Neurological: He is alert and oriented to person, place, and time. He has normal strength. GCS score is 15. GCS eye subscore is 4. GCS verbal subscore is 5. GCS motor subscore is 6.   Skin: Skin is warm and dry. Capillary refill takes less than 2 seconds.       ED Course   Critical Care    Date/Time: 9/14/2022 4:04 PM  Performed by: Pineda Gresham MD  Authorized by: Pineda Gersham MD   Direct patient critical care time: 10 minutes  Additional history critical care time: 10 minutes  Ordering / reviewing critical care time: 10 minutes  Documentation critical care time: 10 minutes  Consulting other physicians critical care time: 10 minutes  Consult with family critical care time: 10 minutes  Total critical care time (exclusive of procedural time) : 60 minutes  Critical care was necessary to treat or prevent imminent or life-threatening deterioration of the following conditions: sepsis.  Critical care was time spent personally by me on the following activities: review of old charts, re-evaluation of patient's condition, pulse oximetry, ordering and review of radiographic studies, ordering and review of laboratory studies, ordering and performing treatments and interventions, obtaining history from patient or surrogate, examination of patient, evaluation of patient's response to treatment, discussions with primary provider and development of treatment plan with patient or surrogate.      Labs Reviewed   CBC W/ AUTO DIFFERENTIAL - Abnormal; Notable for the following components:       Result Value    RBC 3.30 (*)     Hemoglobin 8.5 (*)     Hematocrit 26.4 (*)     MCV 80 (*)     MCH 25.8 (*)     RDW 17.6 (*)     Platelets 108 (*)     Lymph # 0.8 (*)     Gran % 76.1  "(*)     Lymph % 9.8 (*)     All other components within normal limits   COMPREHENSIVE METABOLIC PANEL - Abnormal; Notable for the following components:    Chloride 112 (*)     CO2 18 (*)     Glucose 143 (*)     BUN 37 (*)     Creatinine 2.3 (*)     Calcium 8.4 (*)     Total Protein 5.9 (*)     Albumin 2.3 (*)     Anion Gap 7 (*)     eGFR 31.3 (*)     All other components within normal limits   NT-PRO NATRIURETIC PEPTIDE - Abnormal; Notable for the following components:    NT-proBNP 67840 (*)     All other components within normal limits   POCT INFLUENZA A/B MOLECULAR - Abnormal; Notable for the following components:    POC Molecular Influenza B Ag Positive (*)     All other components within normal limits   CULTURE, BLOOD   CULTURE, BLOOD   LACTIC ACID, PLASMA   URINALYSIS, REFLEX TO URINE CULTURE   LACTIC ACID, PLASMA   SARS-COV-2 RDRP GENE     EKG Readings: (Independently Interpreted)   Initial Reading: No STEMI. Rhythm: Normal Sinus Rhythm. Heart Rate: 79. Ectopy: PACs. Conduction: Normal. ST Segments: Normal ST Segments. T Waves Flipped: V6. Axis: Normal. Q Waves: V1 and V2. Clinical Impression: Normal Sinus Rhythm with PACs     Imaging Results              X-Ray Chest AP Portable (In process)                      Medications   lactated ringers bolus 2,685 mL (2,685 mLs Intravenous New Bag 9/14/22 1529)   vancomycin 1750 mg in 0.9% sodium chloride 500 mL IVPB (has no administration in time range)   oseltamivir capsule 75 mg (has no administration in time range)   ibuprofen tablet 800 mg (800 mg Oral Given 9/14/22 1528)     Medical Decision Making:   Differential Diagnosis:   Fever, viral syndrome, COPD, pneumonia, sepsis  Clinical Tests:   Sepsis Perfusion Assessment: "I attest a sepsis perfusion exam was performed within 6 hours of sepsis, severe sepsis, or septic shock presentation, following fluid resuscitation."    Sepsis Perfusion Assessment Complete: 9/14/2022 3:55 PM             ED Course as of 09/14/22 " 1621   Wed Sep 14, 2022   1518 Chest x-ray with questionable infiltrate bilaterally [SD]   1551 Sepsis post fluid bolus initiation tissue perfusion exam:    See current vital signs.    Cardiopulmonary:   Heart: Regular rate and rhythm   Lungs: basilar rales    Capillary refill: < 3 seconds  Peripheral pulses: radial 2+ bilaterally  Skin: warm/dry/pink with good turgor      [SD]   1559 Discussed case with Trinity from Dr. Davila' office will admit for hypoxia, influenza B [SD]   1600 Patient is obese, he also has history of congestive heart failure, I am afraid he will get overloaded with a 30 mL/kg bolus, continue to monitor [SD]   1603 Lactic acid is only 1.6, therefore will give 1 L of fluid, and then switched to maintenance. [SD]   1613 H&H slightly lower than his baseline, will continue to monitor [SD]      ED Course User Index  [SD] Pineda Gresham MD                 Clinical Impression:   Final diagnoses:  [R50.9] Fever  [J10.1] Influenza B (Primary)  [A41.9] Sepsis, due to unspecified organism, unspecified whether acute organ dysfunction present        ED Disposition Condition    Admit Stable                Pineda Gresham MD  09/14/22 1611       Pineda Gresham MD  09/14/22 1614       Pineda Gresham MD  09/14/22 1620       Pineda Gresham MD  09/14/22 1621

## 2022-09-14 NOTE — ED NOTES
Pt missed the urinal when urinating, pt saturated in urine. Pt sheets, diaper, and gown changed by Kimberly NOONAN and Renetta DANIEL. Pt tolerated well and able to roll independently.

## 2022-09-14 NOTE — ED NOTES
Dr. Gresham does not want sepsis fluids due to results of lactic and hx of CHF will stop after 1 L

## 2022-09-15 PROBLEM — Z79.899 DVT PROPHYLAXIS: Status: ACTIVE | Noted: 2022-09-15

## 2022-09-15 PROBLEM — Z79.4 TYPE 2 DIABETES MELLITUS WITH NEUROLOGIC COMPLICATION, WITH LONG-TERM CURRENT USE OF INSULIN: Status: ACTIVE | Noted: 2022-09-15

## 2022-09-15 PROBLEM — J10.1 INFLUENZA B: Status: ACTIVE | Noted: 2022-09-15

## 2022-09-15 PROBLEM — J96.01 ACUTE HYPOXEMIC RESPIRATORY FAILURE: Status: ACTIVE | Noted: 2022-09-15

## 2022-09-15 PROBLEM — E11.49 TYPE 2 DIABETES MELLITUS WITH NEUROLOGIC COMPLICATION, WITH LONG-TERM CURRENT USE OF INSULIN: Status: ACTIVE | Noted: 2022-09-15

## 2022-09-15 LAB
ALBUMIN SERPL BCP-MCNC: 2.2 G/DL (ref 3.5–5.2)
ALP SERPL-CCNC: 70 U/L (ref 55–135)
ALT SERPL W/O P-5'-P-CCNC: 24 U/L (ref 10–44)
ANION GAP SERPL CALC-SCNC: 7 MMOL/L (ref 8–16)
AST SERPL-CCNC: 15 U/L (ref 10–40)
BASOPHILS # BLD AUTO: ABNORMAL K/UL (ref 0–0.2)
BASOPHILS NFR BLD: 0 % (ref 0–1.9)
BILIRUB SERPL-MCNC: 0.5 MG/DL (ref 0.1–1)
BUN SERPL-MCNC: 46 MG/DL (ref 8–23)
CALCIUM SERPL-MCNC: 8.3 MG/DL (ref 8.7–10.5)
CHLORIDE SERPL-SCNC: 113 MMOL/L (ref 95–110)
CO2 SERPL-SCNC: 18 MMOL/L (ref 23–29)
CREAT SERPL-MCNC: 2.3 MG/DL (ref 0.5–1.4)
DIFFERENTIAL METHOD: ABNORMAL
EOSINOPHIL # BLD AUTO: ABNORMAL K/UL (ref 0–0.5)
EOSINOPHIL NFR BLD: 0 % (ref 0–8)
ERYTHROCYTE [DISTWIDTH] IN BLOOD BY AUTOMATED COUNT: 17.5 % (ref 11.5–14.5)
EST. GFR  (NO RACE VARIABLE): 31.3 ML/MIN/1.73 M^2
GLUCOSE SERPL-MCNC: 277 MG/DL (ref 70–110)
HCT VFR BLD AUTO: 29.1 % (ref 40–54)
HGB BLD-MCNC: 9.2 G/DL (ref 14–18)
IMM GRANULOCYTES # BLD AUTO: ABNORMAL K/UL (ref 0–0.04)
IMM GRANULOCYTES NFR BLD AUTO: ABNORMAL % (ref 0–0.5)
LYMPHOCYTES # BLD AUTO: ABNORMAL K/UL (ref 1–4.8)
LYMPHOCYTES NFR BLD: 7 % (ref 18–48)
MCH RBC QN AUTO: 25.6 PG (ref 27–31)
MCHC RBC AUTO-ENTMCNC: 31.6 G/DL (ref 32–36)
MCV RBC AUTO: 81 FL (ref 82–98)
MONOCYTES # BLD AUTO: ABNORMAL K/UL (ref 0.3–1)
MONOCYTES NFR BLD: 3 % (ref 4–15)
NEUTROPHILS NFR BLD: 74 % (ref 38–73)
NEUTS BAND NFR BLD MANUAL: 16 %
NRBC BLD-RTO: 0 /100 WBC
PLATELET # BLD AUTO: 130 K/UL (ref 150–450)
PMV BLD AUTO: 11.1 FL (ref 9.2–12.9)
POCT GLUCOSE: 265 MG/DL (ref 70–110)
POCT GLUCOSE: 274 MG/DL (ref 70–110)
POCT GLUCOSE: 290 MG/DL (ref 70–110)
POCT GLUCOSE: 292 MG/DL (ref 70–110)
POTASSIUM SERPL-SCNC: 4.6 MMOL/L (ref 3.5–5.1)
PROT SERPL-MCNC: 5.9 G/DL (ref 6–8.4)
RBC # BLD AUTO: 3.6 M/UL (ref 4.6–6.2)
SODIUM SERPL-SCNC: 138 MMOL/L (ref 136–145)
WBC # BLD AUTO: 7.51 K/UL (ref 3.9–12.7)

## 2022-09-15 PROCEDURE — 99900035 HC TECH TIME PER 15 MIN (STAT)

## 2022-09-15 PROCEDURE — 25000003 PHARM REV CODE 250: Performed by: EMERGENCY MEDICINE

## 2022-09-15 PROCEDURE — 27000207 HC ISOLATION

## 2022-09-15 PROCEDURE — 99900031 HC PATIENT EDUCATION (STAT)

## 2022-09-15 PROCEDURE — 63600175 PHARM REV CODE 636 W HCPCS: Performed by: EMERGENCY MEDICINE

## 2022-09-15 PROCEDURE — 80053 COMPREHEN METABOLIC PANEL: CPT | Performed by: EMERGENCY MEDICINE

## 2022-09-15 PROCEDURE — 94761 N-INVAS EAR/PLS OXIMETRY MLT: CPT

## 2022-09-15 PROCEDURE — 11000001 HC ACUTE MED/SURG PRIVATE ROOM

## 2022-09-15 PROCEDURE — 25000003 PHARM REV CODE 250: Performed by: NURSE PRACTITIONER

## 2022-09-15 PROCEDURE — 25000242 PHARM REV CODE 250 ALT 637 W/ HCPCS: Performed by: INTERNAL MEDICINE

## 2022-09-15 PROCEDURE — 94640 AIRWAY INHALATION TREATMENT: CPT

## 2022-09-15 PROCEDURE — 36415 COLL VENOUS BLD VENIPUNCTURE: CPT | Performed by: EMERGENCY MEDICINE

## 2022-09-15 PROCEDURE — 85027 COMPLETE CBC AUTOMATED: CPT | Performed by: EMERGENCY MEDICINE

## 2022-09-15 PROCEDURE — 63600175 PHARM REV CODE 636 W HCPCS: Performed by: NURSE PRACTITIONER

## 2022-09-15 PROCEDURE — 27000221 HC OXYGEN, UP TO 24 HOURS

## 2022-09-15 PROCEDURE — 85007 BL SMEAR W/DIFF WBC COUNT: CPT | Performed by: EMERGENCY MEDICINE

## 2022-09-15 RX ORDER — DOXAZOSIN 2 MG/1
2 TABLET ORAL DAILY
Status: DISCONTINUED | OUTPATIENT
Start: 2022-09-15 | End: 2022-09-16 | Stop reason: HOSPADM

## 2022-09-15 RX ORDER — ENOXAPARIN SODIUM 100 MG/ML
40 INJECTION SUBCUTANEOUS EVERY 24 HOURS
Status: DISCONTINUED | OUTPATIENT
Start: 2022-09-15 | End: 2022-09-16 | Stop reason: HOSPADM

## 2022-09-15 RX ORDER — FLUVOXAMINE MALEATE 25 MG/1
25 TABLET ORAL NIGHTLY
Status: DISCONTINUED | OUTPATIENT
Start: 2022-09-15 | End: 2022-09-16 | Stop reason: HOSPADM

## 2022-09-15 RX ORDER — QUETIAPINE FUMARATE 50 MG/1
50 TABLET, FILM COATED ORAL 2 TIMES DAILY
Status: DISCONTINUED | OUTPATIENT
Start: 2022-09-15 | End: 2022-09-16 | Stop reason: HOSPADM

## 2022-09-15 RX ORDER — FUROSEMIDE 10 MG/ML
80 INJECTION INTRAMUSCULAR; INTRAVENOUS ONCE
Status: COMPLETED | OUTPATIENT
Start: 2022-09-15 | End: 2022-09-15

## 2022-09-15 RX ORDER — IPRATROPIUM BROMIDE 0.5 MG/2.5ML
0.5 SOLUTION RESPIRATORY (INHALATION) EVERY 6 HOURS
Status: DISCONTINUED | OUTPATIENT
Start: 2022-09-15 | End: 2022-09-16 | Stop reason: HOSPADM

## 2022-09-15 RX ADMIN — DOXAZOSIN 2 MG: 2 TABLET ORAL at 04:09

## 2022-09-15 RX ADMIN — PIPERACILLIN AND TAZOBACTAM 4.5 G: 4; .5 INJECTION, POWDER, LYOPHILIZED, FOR SOLUTION INTRAVENOUS; PARENTERAL at 06:09

## 2022-09-15 RX ADMIN — METHYLPREDNISOLONE SODIUM SUCCINATE 125 MG: 125 INJECTION, POWDER, FOR SOLUTION INTRAMUSCULAR; INTRAVENOUS at 12:09

## 2022-09-15 RX ADMIN — INSULIN ASPART 1 UNITS: 100 INJECTION, SOLUTION INTRAVENOUS; SUBCUTANEOUS at 09:09

## 2022-09-15 RX ADMIN — INSULIN ASPART 3 UNITS: 100 INJECTION, SOLUTION INTRAVENOUS; SUBCUTANEOUS at 06:09

## 2022-09-15 RX ADMIN — PIPERACILLIN AND TAZOBACTAM 4.5 G: 4; .5 INJECTION, POWDER, LYOPHILIZED, FOR SOLUTION INTRAVENOUS; PARENTERAL at 02:09

## 2022-09-15 RX ADMIN — METHYLPREDNISOLONE SODIUM SUCCINATE 125 MG: 125 INJECTION, POWDER, FOR SOLUTION INTRAMUSCULAR; INTRAVENOUS at 05:09

## 2022-09-15 RX ADMIN — OSELTAMIVIR PHOSPHATE 30 MG: 6 FOR SUSPENSION ORAL at 06:09

## 2022-09-15 RX ADMIN — FAMOTIDINE 20 MG: 20 TABLET ORAL at 08:09

## 2022-09-15 RX ADMIN — SODIUM CHLORIDE: 0.9 INJECTION, SOLUTION INTRAVENOUS at 05:09

## 2022-09-15 RX ADMIN — FLUVOXAMINE MALEATE 25 MG: 25 TABLET, COATED ORAL at 09:09

## 2022-09-15 RX ADMIN — LEVOTHYROXINE SODIUM 50 MCG: 0.05 TABLET ORAL at 08:09

## 2022-09-15 RX ADMIN — INSULIN ASPART 3 UNITS: 100 INJECTION, SOLUTION INTRAVENOUS; SUBCUTANEOUS at 04:09

## 2022-09-15 RX ADMIN — PIPERACILLIN AND TAZOBACTAM 4.5 G: 4; .5 INJECTION, POWDER, LYOPHILIZED, FOR SOLUTION INTRAVENOUS; PARENTERAL at 09:09

## 2022-09-15 RX ADMIN — VANCOMYCIN HYDROCHLORIDE 1000 MG: 1 INJECTION, POWDER, LYOPHILIZED, FOR SOLUTION INTRAVENOUS at 04:09

## 2022-09-15 RX ADMIN — IPRATROPIUM BROMIDE 0.5 MG: 0.5 SOLUTION RESPIRATORY (INHALATION) at 04:09

## 2022-09-15 RX ADMIN — METHYLPREDNISOLONE SODIUM SUCCINATE 125 MG: 125 INJECTION, POWDER, FOR SOLUTION INTRAMUSCULAR; INTRAVENOUS at 11:09

## 2022-09-15 RX ADMIN — METHYLPREDNISOLONE SODIUM SUCCINATE 125 MG: 125 INJECTION, POWDER, FOR SOLUTION INTRAMUSCULAR; INTRAVENOUS at 06:09

## 2022-09-15 RX ADMIN — INSULIN ASPART 3 UNITS: 100 INJECTION, SOLUTION INTRAVENOUS; SUBCUTANEOUS at 11:09

## 2022-09-15 RX ADMIN — FUROSEMIDE 80 MG: 10 INJECTION, SOLUTION INTRAMUSCULAR; INTRAVENOUS at 04:09

## 2022-09-15 RX ADMIN — QUETIAPINE FUMARATE 50 MG: 50 TABLET ORAL at 09:09

## 2022-09-15 RX ADMIN — INSULIN DETEMIR 10 UNITS: 100 INJECTION, SOLUTION SUBCUTANEOUS at 09:09

## 2022-09-15 RX ADMIN — OSELTAMIVIR PHOSPHATE 30 MG: 6 FOR SUSPENSION ORAL at 04:09

## 2022-09-15 RX ADMIN — ENOXAPARIN SODIUM 40 MG: 100 INJECTION SUBCUTANEOUS at 04:09

## 2022-09-15 RX ADMIN — ATORVASTATIN CALCIUM 40 MG: 40 TABLET, FILM COATED ORAL at 08:09

## 2022-09-15 NOTE — SUBJECTIVE & OBJECTIVE
Past Medical History:   Diagnosis Date    Anxiety     Arthritis     CHF (congestive heart failure)     Chronic kidney disease     COPD (chronic obstructive pulmonary disease)     Coronary artery disease     Diabetes mellitus     Erectile dysfunction     Hypertension     Necrotizing fasciitis of forearm     Peripheral neuropathy     Stroke     TIA    Thyroid disease        Past Surgical History:   Procedure Laterality Date    arm surgery Right     arthroscopy Right     knee    COLONOSCOPY N/A 7/3/2019    Procedure: COLONOSCOPY;  Surgeon: Jagdish Pollock MD;  Location: [facility] ENDO;  Service: Endoscopy;  Laterality: N/A;    ESOPHAGOGASTRODUODENOSCOPY N/A 7/3/2019    Procedure: ESOPHAGOGASTRODUODENOSCOPY (EGD);  Surgeon: Jagdish Pollock MD;  Location: [facility] ENDO;  Service: Endoscopy;  Laterality: N/A;    EYE SURGERY      FUSION, SPINE, POSTERIOR APPROACH N/A 7/29/2022    Procedure: FUSION,SPINE,POSTERIOR APPROACH, T9-L3;  Surgeon: Luis E Lawson MD;  Location: [facility] OR;  Service: Orthopedics;  Laterality: N/A;    ROTATOR CUFF REPAIR Right     TONSILLECTOMY      VERTEBRAL CORPECTOMY N/A 7/27/2022    Procedure: CORPECTOMY T12;  Surgeon: Luis E Lawson MD;  Location: [facility] OR;  Service: Orthopedics;  Laterality: N/A;       Review of patient's allergies indicates:   Allergen Reactions    Onion     Pork/porcine containing products     Shellfish containing products     Shrimp        No current facility-administered medications on file prior to encounter.     Current Outpatient Medications on File Prior to Encounter   Medication Sig    albuterol-ipratropium (DUO-NEB) 2.5 mg-0.5 mg/3 mL nebulizer solution Take 3 mLs by nebulization every 6 (six) hours while awake. Rescue    aspirin 81 MG Chew Take 1 tablet (81 mg total) by mouth once daily.    atorvastatin (LIPITOR) 40 MG tablet Take 1 tablet (40 mg total) by mouth once daily.    clonazePAM (KLONOPIN) 0.5 MG tablet Take 1 tablet (0.5 mg total) by mouth 3 (three) times  daily. (Patient taking differently: Take 0.5 mg by mouth 2 (two) times daily.)    doxazosin (CARDURA) 2 MG tablet Take 1 tablet (2 mg total) by mouth once daily.    ferrous gluconate (FERGON) 324 MG tablet Take 1 tablet (324 mg total) by mouth daily with breakfast.    fluticasone furoate-vilanteroL (BREO) 100-25 mcg/dose diskus inhaler Inhale 1 puff into the lungs once daily. Controller    fluvoxaMINE (LUVOX) 25 MG tablet Take 1 tablet (25 mg total) by mouth every evening.    insulin aspart U-100 (NOVOLOG) 100 unit/mL (3 mL) InPn pen Inject 1-10 Units into the skin before meals and at bedtime as needed (Hyperglycemia).    insulin detemir U-100 (LEVEMIR FLEXTOUCH U-100 INSULN) 100 unit/mL (3 mL) InPn pen Inject 10 Units into the skin every evening.    lactulose (CEPHULAC) 10 gram packet Take 1 packet (10 g total) by mouth 2 (two) times daily.    levothyroxine (SYNTHROID) 50 MCG tablet Take 1 tablet (50 mcg total) by mouth once daily.    metoprolol succinate (TOPROL-XL) 25 MG 24 hr tablet Take 1 tablet (25 mg total) by mouth once daily.    nicotine (NICODERM CQ) 14 mg/24 hr Place 1 patch onto the skin once daily.    NIFEdipine (PROCARDIA-XL) 90 MG (OSM) 24 hr tablet Take 1 tablet (90 mg total) by mouth once daily.    oxyCODONE-acetaminophen (PERCOCET) 5-325 mg per tablet Take 1 tablet by mouth every 8 (eight) hours as needed for Pain.    pantoprazole (PROTONIX) 40 MG tablet Take 1 tablet (40 mg total) by mouth once daily.    polyethylene glycol (GLYCOLAX) 17 gram PwPk Take 17 g by mouth once daily.    QUEtiapine (SEROQUEL) 50 MG tablet Take 1 tablet (50 mg total) by mouth 2 (two) times daily.    senna-docusate 8.6-50 mg (PERICOLACE) 8.6-50 mg per tablet Take 1 tablet by mouth once daily.    tamsulosin (FLOMAX) 0.4 mg Cap Take 1 capsule (0.4 mg total) by mouth once daily.    tiotropium bromide (SPIRIVA RESPIMAT) 2.5 mcg/actuation inhaler Inhale 2 puffs into the lungs once daily. Controller    [DISCONTINUED] albuterol  (PROVENTIL) 2.5 mg /3 mL (0.083 %) nebulizer solution USE 1 VIAL VIA NEBULIZER EVERY 6 HOURS AS NEEDED 7/9/21    [DISCONTINUED] amLODIPine (NORVASC) 10 MG tablet Take 1 tablet (10 mg total) by mouth once daily.    [DISCONTINUED] furosemide (LASIX) 80 MG tablet Take 1 tablet (80 mg total) by mouth 2 (two) times daily.    [DISCONTINUED] isosorbide-hydrALAZINE 20-37.5 mg (BIDIL) 20-37.5 mg Tab Take 1 tablet by mouth 2 (two) times daily.    [DISCONTINUED] metoprolol tartrate (LOPRESSOR) 50 MG tablet Take 1 tablet (50 mg total) by mouth 2 (two) times daily.    [DISCONTINUED] mirtazapine (REMERON) 30 MG tablet Take 30 mg by mouth every evening.    [DISCONTINUED] omeprazole (PRILOSEC) 40 MG capsule Take 1 capsule (40 mg total) by mouth once daily.    [DISCONTINUED] pioglitazone (ACTOS) 30 MG tablet Take 30 mg by mouth once daily.    [DISCONTINUED] rivastigmine (EXELON) 4.6 mg/24 hour PT24 Place 1 patch onto the skin once daily.    [DISCONTINUED] SITagliptin (JANUVIA) 100 MG Tab Take 100 mg by mouth once daily.    [DISCONTINUED] traZODone (DESYREL) 50 MG tablet Take 50 mg by mouth every evening.     Family History       Problem Relation (Age of Onset)    COPD Mother    Cancer Brother    Diabetes Father          Tobacco Use    Smoking status: Every Day     Packs/day: 1.00     Years: 40.00     Pack years: 40.00     Types: Cigarettes    Smokeless tobacco: Never   Substance and Sexual Activity    Alcohol use: Not Currently    Drug use: Not Currently    Sexual activity: Not on file     Review of Systems   Constitutional:  Positive for activity change, appetite change, diaphoresis, fatigue and fever.   HENT:  Positive for congestion.    Respiratory:  Positive for cough. Negative for chest tightness.    Cardiovascular:  Negative for chest pain and palpitations.   Gastrointestinal: Negative.    Genitourinary: Negative.    Musculoskeletal:  Positive for myalgias.   Neurological:  Positive for weakness. Negative for dizziness,  light-headedness and headaches.   Psychiatric/Behavioral: Negative.     Objective:     Vital Signs (Most Recent):  Temp: 97.7 °F (36.5 °C) (09/15/22 1147)  Pulse: 63 (09/15/22 1147)  Resp: (!) 22 (09/15/22 1145)  BP: 135/63 (09/15/22 1145)  SpO2: 97 % (09/15/22 1405)   Vital Signs (24h Range):  Temp:  [97.5 °F (36.4 °C)-100.6 °F (38.1 °C)] 97.7 °F (36.5 °C)  Pulse:  [55-82] 63  Resp:  [18-28] 22  SpO2:  [90 %-100 %] 97 %  BP: ()/(53-80) 135/63     Weight: 89.5 kg (197 lb 4.8 oz)  Body mass index is 29.14 kg/m².    Physical Exam  Vitals and nursing note reviewed.   Constitutional:       Appearance: He is ill-appearing.   HENT:      Head: Normocephalic.      Nose: Congestion present.      Mouth/Throat:      Mouth: Mucous membranes are moist.      Pharynx: Oropharynx is clear.   Eyes:      General: No scleral icterus.     Extraocular Movements: Extraocular movements intact.      Pupils: Pupils are equal, round, and reactive to light.   Cardiovascular:      Rate and Rhythm: Normal rate and regular rhythm.      Pulses: Normal pulses.      Heart sounds: Normal heart sounds.   Pulmonary:      Effort: Pulmonary effort is normal.      Breath sounds: Rales (left lung fields) present.      Comments: Oxygen per Ventimask; tachypnea  Abdominal:      General: Bowel sounds are normal. There is no distension.      Palpations: Abdomen is soft.      Tenderness: There is no abdominal tenderness. There is no guarding.   Musculoskeletal:         General: Swelling present. Normal range of motion.      Cervical back: Normal range of motion. No rigidity.   Skin:     General: Skin is warm and dry.      Capillary Refill: Capillary refill takes less than 2 seconds.   Neurological:      General: No focal deficit present.      Mental Status: He is alert and oriented to person, place, and time.   Psychiatric:         Mood and Affect: Mood normal.         Behavior: Behavior normal.         CRANIAL NERVES     CN III, IV, VI   Pupils are  equal, round, and reactive to light.     Significant Labs: All pertinent labs within the past 24 hours have been reviewed.  A1C:   Recent Labs   Lab 04/04/22  2143 07/26/22  0631 09/14/22  1526   HGBA1C 5.6 6.1* 6.1*     Blood Culture:   Recent Labs   Lab 09/14/22  1526   LABBLOO No Growth to date  No Growth to date     CBC:   Recent Labs   Lab 09/14/22  1526 09/15/22  0544   WBC 7.72 7.51   HGB 8.5* 9.2*   HCT 26.4* 29.1*   * 130*     CMP:   Recent Labs   Lab 09/14/22  1526 09/15/22  0544    138   K 3.8 4.6   * 113*   CO2 18* 18*   * 277*   BUN 37* 46*   CREATININE 2.3* 2.3*   CALCIUM 8.4* 8.3*   PROT 5.9* 5.9*   ALBUMIN 2.3* 2.2*   BILITOT 0.7 0.5   ALKPHOS 73 70   AST 19 15   ALT 28 24   ANIONGAP 7* 7*     Lactic Acid:   Recent Labs   Lab 09/14/22  1526 09/14/22  1923   LACTATE 1.6 1.6     POCT Glucose:   Recent Labs   Lab 09/14/22  2241 09/15/22  0542   POCTGLUCOSE 278* 274*     Urine Studies:   Recent Labs   Lab 09/14/22  1539   COLORU Yellow   APPEARANCEUA Cloudy*   PHUR 5.0   SPECGRAV 1.020   PROTEINUA 2+*   GLUCUA Negative   KETONESU Negative   BILIRUBINUA Negative   OCCULTUA Negative   NITRITE Negative   UROBILINOGEN 1.0   LEUKOCYTESUR Negative   RBCUA 4   WBCUA 3   BACTERIA Negative   SQUAMEPITHEL 2   HYALINECASTS 3.5*       Significant Imaging: I have reviewed all pertinent imaging results/findings within the past 24 hours.    X-Ray Chest AP Portable  Narrative: EXAMINATION:  XR CHEST AP PORTABLE    CLINICAL HISTORY:  Sepsis;    TECHNIQUE:  Frontal view obtained of the chest    COMPARISON:  08/25/2022    FINDINGS:  Stable cardiomediastinal contours.  Right lung is clear.  Developed left basilar opacity obscuring the left hemidiaphragm and blunting the left costophrenic angle.  Impression: Developed left basilar atelectasis versus consolidation.  A small amount of left pleural fluid may also be present.    Electronically signed by: Rl Ramos,  MD  Date:    09/14/2022  Time:    16:32

## 2022-09-15 NOTE — PLAN OF CARE
[facility]  Initial Discharge Assessment       Primary Care Provider: Jacob Zuniga MD    Admission Diagnosis: Influenza B [J10.1]  Fever [R50.9]  Sepsis, due to unspecified organism, unspecified whether acute organ dysfunction present [A41.9]    Admission Date: 9/14/2022  Expected Discharge Date:          Payor: Kash MEDICARE / Plan: [employer] HEALTH / Product Type: Medicare Advantage /     Extended Emergency Contact Information  Primary Emergency Contact: olivia conroy  Mobile Phone: [phone]  Relation: Son  Secondary Emergency Contact: annabella conroy  Mobile Phone: [phone]  Relation: Son    Discharge Plan A: Return to nursing home  Discharge Plan B: Return to Nursing Home      [id number] - [place] - [address] AT [facility]  [address]  [place] [address]  Phone: [phone] Fax: [phone]    [place] [facility] [address] [facility]  [address] [facility] [address]  Phone: [phone] Fax: [phone]      Initial Assessment (most recent)       Adult Discharge Assessment - 09/15/22 1000          Discharge Assessment    Assessment Type Discharge Planning Assessment     Source of Information patient;facility verbal report     Lives With facility resident     Facility Arrived From: [facility]     Do you expect to return to your current living situation? Yes     Do you have help at home or someone to help you manage your care at home? Yes     Who are your caregiver(s) and their phone number(s)? Nursing Staff from [facility]-[phone]     Prior to hospitilization cognitive status: Alert/Oriented     Current cognitive status: Alert/Oriented     Walking or Climbing Stairs Difficulty ambulation difficulty, requires equipment;ambulation difficulty, assistance 1 person     Mobility Management wheelchair     Dressing/Bathing Difficulty bathing difficulty, requires  "equipment;bathing difficulty, assistance 1 person     Dressing/Bathing Management shower chair     Home Accessibility wheelchair accessible     Home Layout Able to live on 1st floor     Equipment Currently Used at Home wheelchair;nebulizer;glucometer;shower chair   The patient gets breathing treatments. He has oxygen PRN    Readmission within 30 days? Yes     Patient currently being followed by outpatient case management? No     Do you take prescription medications? Yes     Do you have prescription coverage? Yes     Coverage [employer] - [employer]     Do you have any problems affording any of your prescribed medications? No     Is the patient taking medications as prescribed? yes     How do you get to doctors appointments? other (see comments)   [facility]    Are you on dialysis? No     Do you take coumadin? No     Discharge Plan A Return to nursing home     Discharge Plan B Return to Nursing Home     DME Needed Upon Discharge  other (see comments)   TBD    Discharge Plan discussed with: Patient        Physical Activity    On average, how many days per week do you engage in moderate to strenuous exercise (like a brisk walk)? 0 days   The patient uses his wheelchair around the facility    On average, how many minutes do you engage in exercise at this level? 0 min        Financial Resource Strain    How hard is it for you to pay for the very basics like food, housing, medical care, and heating? Not hard at all   The patient is a resident at [facility].       Stress    Do you feel stress - tense, restless, nervous, or anxious, or unable to sleep at night because your mind is troubled all the time - these days? To some extent   The patient misses his family and dogs.       Social Connections    In a typical week, how many times do you talk on the phone with family, friends, or neighbors? --   The patient talks to his son "every now and then."    How often do " "you get together with friends or relatives? Never     Do you belong to any clubs or organizations such as Confucianist groups, unions, fraternal or athletic groups, or school groups? No     Are you , , , , never , or living with a partner?                      Readmission Assessment (most recent)       Readmission Assessment - 09/15/22 1017          Readmission    Why were you hospitalized in the last 30 days? Hypoglycemia     Why were you readmitted? Alarmed about signs/symptoms     When you left the hospital how did you feel? The patient unable to veify. He stated "I don't know."     When you left the hospital where did you go? Nursing Home     Tell me about what happened between when you left the hospital and the day you returned. The patient unable to verify     When did you start not feeling well? The patient stated that she started not feeling well two days ago.     Did you try to manage your symptoms your self? No     Did you call anyone? --   The patient was sent to the hospital from [facility]                Initial discharge assessment is completed. The patient is a resident from [facility]. He stated that he will be returning back to the facility upon discharge. I was able to speak to the patient's nurse at the facility. He indicated that the patient does transfers to his wheelchair, and he his able to use his wheelchair at the facility. He does get breathing treatments, and he has an order for oxygen as needed. He also gets his glucose levels checked daily.     At this time, the patient does not require any assistance from case management. SW/CM will continue to monitor until the patient is d/c.             "

## 2022-09-15 NOTE — ASSESSMENT & PLAN NOTE
This patient does have evidence of infective focus  My overall impression is sepsis. Vital signs were reviewed and noted in progress note.  Antibiotics given-   Antibiotics (From admission, onward)    Start     Stop Route Frequency Ordered    09/15/22 1630  vancomycin in dextrose 5 % 1 gram/250 mL IVPB 1,000 mg         -- IV Every 24 hours (non-standard times) 09/14/22 1834    09/14/22 1815  piperacillin-tazobactam 4.5 g in dextrose 5 % 100 mL IVPB (ready to mix system)         -- IV Every 8 hours (non-standard times) 09/14/22 1804    09/14/22 1804  vancomycin - pharmacy to dose  (vancomycin IVPB)        See Hyperspace for full Linked Orders Report.    -- IV pharmacy to manage frequency 09/14/22 1804        Cultures were taken-   Microbiology Results (last 7 days)     Procedure Component Value Units Date/Time    Blood culture x two cultures. Draw prior to antibiotics. [[id number]] Collected: 09/14/22 1526    Order Status: Completed Specimen: Blood Updated: 09/15/22 0715     Blood Culture, Routine No Growth to date    Narrative:      Aerobic and anaerobic    Blood culture x two cultures. Draw prior to antibiotics. [[id number]] Collected: 09/14/22 1526    Order Status: Completed Specimen: Blood Updated: 09/15/22 0715     Blood Culture, Routine No Growth to date    Narrative:      Aerobic and anaerobic        Latest lactate reviewed, they are-  Recent Labs   Lab 09/14/22 1526 09/14/22  1923   LACTATE 1.6 1.6       Organ dysfunction indicated by Acute respiratory failure  Source- Influenza B    Source control Achieved by- Oxygen, IV fluids, IV antibiotics, IV steroid, Neb treatments

## 2022-09-15 NOTE — ASSESSMENT & PLAN NOTE
Patient's FSGs are controlled on current medication regimen.  Last A1c reviewed-   Lab Results   Component Value Date    HGBA1C 6.1 (H) 09/14/2022     Most recent fingerstick glucose reviewed-   Recent Labs   Lab 09/14/22  2241 09/15/22  0542   POCTGLUCOSE 278* 274*     Current correctional scale  Low  Maintain anti-hyperglycemic dose as follows-   Antihyperglycemics (From admission, onward)    Start     Stop Route Frequency Ordered    09/14/22 1804  insulin aspart U-100 pen 0-5 Units         -- SubQ Before meals & nightly PRN 09/14/22 1804        Hold Oral hypoglycemics while patient is in the hospital.

## 2022-09-15 NOTE — ASSESSMENT & PLAN NOTE
Patient with Hypoxic Respiratory failure which is Acute.  he is not on home oxygen. Supplemental oxygen was provided and noted- Oxygen Concentration (%):  [36-50] 40.   Signs/symptoms of respiratory failure include- tachypnea, increased work of breathing and use of accessory muscles. Contributing diagnoses includes - Influenza B Labs and images were reviewed. Patient Has not had a recent ABG. Will treat underlying causes and adjust management of respiratory failure as follows- Oxygen, IV steroids, neb treatments, IV abx therapy

## 2022-09-15 NOTE — H&P
[facility] Medicine  History & Physical    Patient Name: Pam Gray  MRN: [id number]  Patient Class: IP- Inpatient  Admission Date: 9/14/2022  Attending Physician: Bhanu Davila III, MD   Primary Care Provider: Jacob Zuniga MD         Patient information was obtained from patient, past medical records and ER records.     Subjective:     Principal Problem:Sepsis    Chief Complaint:   Chief Complaint   Patient presents with    Shortness of Breath     EMS reports resident from [facility] with c/o SOB, 102.7 Temp, Co-Vid (-) per staff, low Sats        HPI: Patient is a 62-year-old male, resident of [facility], with history of chronic kidney disease, congestive heart failure, COPD, diabetes, hypertension, hypothyroid, and prior CVA, presents with complaint of fever, low oxygen saturation, and shortness of breath x1 day.  His primary providers on call doctor was contacted the night before admit with regards of fever, shortness of breath, cough, and not feeling well.  She prescribed treatment; however, the symptoms worsened the following day prompting the patient to be taken to the emergency department.  Upon arrival patient found to be hypotensive, tachypneic, and hypoxic.  Patient is diagnosis sepsis with hypoxia secondary to influenza B.  Patient admitted for oxygen, IV fluids, IV antibiotic therapy, IV steroids, and neb treatments. This morning, the patient complains of feeling fatigued and malaise with subjective fever and cough.  He denies shortness of breath and requires supplemental oxygen.          Past Medical History:   Diagnosis Date    Anxiety     Arthritis     CHF (congestive heart failure)     Chronic kidney disease     COPD (chronic obstructive pulmonary disease)     Coronary artery disease     Diabetes mellitus     Erectile dysfunction     Hypertension     Necrotizing fasciitis of forearm     Peripheral neuropathy     Stroke     TIA    Thyroid disease         Past Surgical History:   Procedure Laterality Date    arm surgery Right     arthroscopy Right     knee    COLONOSCOPY N/A 7/3/2019    Procedure: COLONOSCOPY;  Surgeon: Jagdish Pollock MD;  Location: [facility] ENDO;  Service: Endoscopy;  Laterality: N/A;    ESOPHAGOGASTRODUODENOSCOPY N/A 7/3/2019    Procedure: ESOPHAGOGASTRODUODENOSCOPY (EGD);  Surgeon: Jagdish Pollock MD;  Location: [facility] ENDO;  Service: Endoscopy;  Laterality: N/A;    EYE SURGERY      FUSION, SPINE, POSTERIOR APPROACH N/A 7/29/2022    Procedure: FUSION,SPINE,POSTERIOR APPROACH, T9-L3;  Surgeon: Luis E Lawson MD;  Location: [facility] OR;  Service: Orthopedics;  Laterality: N/A;    ROTATOR CUFF REPAIR Right     TONSILLECTOMY      VERTEBRAL CORPECTOMY N/A 7/27/2022    Procedure: CORPECTOMY T12;  Surgeon: Luis E Lawson MD;  Location: [facility] OR;  Service: Orthopedics;  Laterality: N/A;       Review of patient's allergies indicates:   Allergen Reactions    Onion     Pork/porcine containing products     Shellfish containing products     Shrimp        No current facility-administered medications on file prior to encounter.     Current Outpatient Medications on File Prior to Encounter   Medication Sig    albuterol-ipratropium (DUO-NEB) 2.5 mg-0.5 mg/3 mL nebulizer solution Take 3 mLs by nebulization every 6 (six) hours while awake. Rescue    aspirin 81 MG Chew Take 1 tablet (81 mg total) by mouth once daily.    atorvastatin (LIPITOR) 40 MG tablet Take 1 tablet (40 mg total) by mouth once daily.    clonazePAM (KLONOPIN) 0.5 MG tablet Take 1 tablet (0.5 mg total) by mouth 3 (three) times daily. (Patient taking differently: Take 0.5 mg by mouth 2 (two) times daily.)    doxazosin (CARDURA) 2 MG tablet Take 1 tablet (2 mg total) by mouth once daily.    ferrous gluconate (FERGON) 324 MG tablet Take 1 tablet (324 mg total) by mouth daily with breakfast.    fluticasone furoate-vilanteroL (BREO) 100-25 mcg/dose diskus inhaler Inhale 1  puff into the lungs once daily. Controller    fluvoxaMINE (LUVOX) 25 MG tablet Take 1 tablet (25 mg total) by mouth every evening.    insulin aspart U-100 (NOVOLOG) 100 unit/mL (3 mL) InPn pen Inject 1-10 Units into the skin before meals and at bedtime as needed (Hyperglycemia).    insulin detemir U-100 (LEVEMIR FLEXTOUCH U-100 INSULN) 100 unit/mL (3 mL) InPn pen Inject 10 Units into the skin every evening.    lactulose (CEPHULAC) 10 gram packet Take 1 packet (10 g total) by mouth 2 (two) times daily.    levothyroxine (SYNTHROID) 50 MCG tablet Take 1 tablet (50 mcg total) by mouth once daily.    metoprolol succinate (TOPROL-XL) 25 MG 24 hr tablet Take 1 tablet (25 mg total) by mouth once daily.    nicotine (NICODERM CQ) 14 mg/24 hr Place 1 patch onto the skin once daily.    NIFEdipine (PROCARDIA-XL) 90 MG (OSM) 24 hr tablet Take 1 tablet (90 mg total) by mouth once daily.    oxyCODONE-acetaminophen (PERCOCET) 5-325 mg per tablet Take 1 tablet by mouth every 8 (eight) hours as needed for Pain.    pantoprazole (PROTONIX) 40 MG tablet Take 1 tablet (40 mg total) by mouth once daily.    polyethylene glycol (GLYCOLAX) 17 gram PwPk Take 17 g by mouth once daily.    QUEtiapine (SEROQUEL) 50 MG tablet Take 1 tablet (50 mg total) by mouth 2 (two) times daily.    senna-docusate 8.6-50 mg (PERICOLACE) 8.6-50 mg per tablet Take 1 tablet by mouth once daily.    tamsulosin (FLOMAX) 0.4 mg Cap Take 1 capsule (0.4 mg total) by mouth once daily.    tiotropium bromide (SPIRIVA RESPIMAT) 2.5 mcg/actuation inhaler Inhale 2 puffs into the lungs once daily. Controller    [DISCONTINUED] albuterol (PROVENTIL) 2.5 mg /3 mL (0.083 %) nebulizer solution USE 1 VIAL VIA NEBULIZER EVERY 6 HOURS AS NEEDED 7/9/21    [DISCONTINUED] amLODIPine (NORVASC) 10 MG tablet Take 1 tablet (10 mg total) by mouth once daily.    [DISCONTINUED] furosemide (LASIX) 80 MG tablet Take 1 tablet (80 mg total) by mouth 2 (two) times daily.     [DISCONTINUED] isosorbide-hydrALAZINE 20-37.5 mg (BIDIL) 20-37.5 mg Tab Take 1 tablet by mouth 2 (two) times daily.    [DISCONTINUED] metoprolol tartrate (LOPRESSOR) 50 MG tablet Take 1 tablet (50 mg total) by mouth 2 (two) times daily.    [DISCONTINUED] mirtazapine (REMERON) 30 MG tablet Take 30 mg by mouth every evening.    [DISCONTINUED] omeprazole (PRILOSEC) 40 MG capsule Take 1 capsule (40 mg total) by mouth once daily.    [DISCONTINUED] pioglitazone (ACTOS) 30 MG tablet Take 30 mg by mouth once daily.    [DISCONTINUED] rivastigmine (EXELON) 4.6 mg/24 hour PT24 Place 1 patch onto the skin once daily.    [DISCONTINUED] SITagliptin (JANUVIA) 100 MG Tab Take 100 mg by mouth once daily.    [DISCONTINUED] traZODone (DESYREL) 50 MG tablet Take 50 mg by mouth every evening.     Family History       Problem Relation (Age of Onset)    COPD Mother    Cancer Brother    Diabetes Father          Tobacco Use    Smoking status: Every Day     Packs/day: 1.00     Years: 40.00     Pack years: 40.00     Types: Cigarettes    Smokeless tobacco: Never   Substance and Sexual Activity    Alcohol use: Not Currently    Drug use: Not Currently    Sexual activity: Not on file     Review of Systems   Constitutional:  Positive for activity change, appetite change, diaphoresis, fatigue and fever.   HENT:  Positive for congestion.    Respiratory:  Positive for cough. Negative for chest tightness.    Cardiovascular:  Negative for chest pain and palpitations.   Gastrointestinal: Negative.    Genitourinary: Negative.    Musculoskeletal:  Positive for myalgias.   Neurological:  Positive for weakness. Negative for dizziness, light-headedness and headaches.   Psychiatric/Behavioral: Negative.     Objective:     Vital Signs (Most Recent):  Temp: 97.7 °F (36.5 °C) (09/15/22 1147)  Pulse: 63 (09/15/22 1147)  Resp: (!) 22 (09/15/22 1145)  BP: 135/63 (09/15/22 1145)  SpO2: 97 % (09/15/22 1405)   Vital Signs (24h Range):  Temp:  [97.5 °F  (36.4 °C)-100.6 °F (38.1 °C)] 97.7 °F (36.5 °C)  Pulse:  [55-82] 63  Resp:  [18-28] 22  SpO2:  [90 %-100 %] 97 %  BP: ()/(53-80) 135/63     Weight: 89.5 kg (197 lb 4.8 oz)  Body mass index is 29.14 kg/m².    Physical Exam  Vitals and nursing note reviewed.   Constitutional:       Appearance: He is ill-appearing.   HENT:      Head: Normocephalic.      Nose: Congestion present.      Mouth/Throat:      Mouth: Mucous membranes are moist.      Pharynx: Oropharynx is clear.   Eyes:      General: No scleral icterus.     Extraocular Movements: Extraocular movements intact.      Pupils: Pupils are equal, round, and reactive to light.   Cardiovascular:      Rate and Rhythm: Normal rate and regular rhythm.      Pulses: Normal pulses.      Heart sounds: Normal heart sounds.   Pulmonary:      Effort: Pulmonary effort is normal.      Breath sounds: Rales (left lung fields) present.      Comments: Oxygen per Ventimask; tachypnea  Abdominal:      General: Bowel sounds are normal. There is no distension.      Palpations: Abdomen is soft.      Tenderness: There is no abdominal tenderness. There is no guarding.   Musculoskeletal:         General: Swelling present. Normal range of motion.      Cervical back: Normal range of motion. No rigidity.   Skin:     General: Skin is warm and dry.      Capillary Refill: Capillary refill takes less than 2 seconds.   Neurological:      General: No focal deficit present.      Mental Status: He is alert and oriented to person, place, and time.   Psychiatric:         Mood and Affect: Mood normal.         Behavior: Behavior normal.         CRANIAL NERVES     CN III, IV, VI   Pupils are equal, round, and reactive to light.     Significant Labs: All pertinent labs within the past 24 hours have been reviewed.  A1C:   Recent Labs   Lab 04/04/22  2143 07/26/22  0631 09/14/22  1526   HGBA1C 5.6 6.1* 6.1*     Blood Culture:   Recent Labs   Lab 09/14/22  1526   LABBLOO No Growth to date  No Growth to  date     CBC:   Recent Labs   Lab 09/14/22  1526 09/15/22  0544   WBC 7.72 7.51   HGB 8.5* 9.2*   HCT 26.4* 29.1*   * 130*     CMP:   Recent Labs   Lab 09/14/22  1526 09/15/22  0544    138   K 3.8 4.6   * 113*   CO2 18* 18*   * 277*   BUN 37* 46*   CREATININE 2.3* 2.3*   CALCIUM 8.4* 8.3*   PROT 5.9* 5.9*   ALBUMIN 2.3* 2.2*   BILITOT 0.7 0.5   ALKPHOS 73 70   AST 19 15   ALT 28 24   ANIONGAP 7* 7*     Lactic Acid:   Recent Labs   Lab 09/14/22  1526 09/14/22  1923   LACTATE 1.6 1.6     POCT Glucose:   Recent Labs   Lab 09/14/22  2241 09/15/22  0542   POCTGLUCOSE 278* 274*     Urine Studies:   Recent Labs   Lab 09/14/22  1539   COLORU Yellow   APPEARANCEUA Cloudy*   PHUR 5.0   SPECGRAV 1.020   PROTEINUA 2+*   GLUCUA Negative   KETONESU Negative   BILIRUBINUA Negative   OCCULTUA Negative   NITRITE Negative   UROBILINOGEN 1.0   LEUKOCYTESUR Negative   RBCUA 4   WBCUA 3   BACTERIA Negative   SQUAMEPITHEL 2   HYALINECASTS 3.5*       Significant Imaging: I have reviewed all pertinent imaging results/findings within the past 24 hours.    X-Ray Chest AP Portable  Narrative: EXAMINATION:  XR CHEST AP PORTABLE    CLINICAL HISTORY:  Sepsis;    TECHNIQUE:  Frontal view obtained of the chest    COMPARISON:  08/25/2022    FINDINGS:  Stable cardiomediastinal contours.  Right lung is clear.  Developed left basilar opacity obscuring the left hemidiaphragm and blunting the left costophrenic angle.  Impression: Developed left basilar atelectasis versus consolidation.  A small amount of left pleural fluid may also be present.    Electronically signed by: Rl Ramos MD  Date:    09/14/2022  Time:    16:32    Assessment/Plan:     * Sepsis  This patient does have evidence of infective focus  My overall impression is sepsis. Vital signs were reviewed and noted in progress note.  Antibiotics given-   Antibiotics (From admission, onward)    Start     Stop Route Frequency Ordered    09/15/22 1630  vancomycin in  dextrose 5 % 1 gram/250 mL IVPB 1,000 mg         -- IV Every 24 hours (non-standard times) 09/14/22 1834    09/14/22 1815  piperacillin-tazobactam 4.5 g in dextrose 5 % 100 mL IVPB (ready to mix system)         -- IV Every 8 hours (non-standard times) 09/14/22 1804    09/14/22 1804  vancomycin - pharmacy to dose  (vancomycin IVPB)        See Hyperspace for full Linked Orders Report.    -- IV pharmacy to manage frequency 09/14/22 1804        Cultures were taken-   Microbiology Results (last 7 days)     Procedure Component Value Units Date/Time    Blood culture x two cultures. Draw prior to antibiotics. [[id number]] Collected: 09/14/22 1526    Order Status: Completed Specimen: Blood Updated: 09/15/22 0715     Blood Culture, Routine No Growth to date    Narrative:      Aerobic and anaerobic    Blood culture x two cultures. Draw prior to antibiotics. [[id number]] Collected: 09/14/22 1526    Order Status: Completed Specimen: Blood Updated: 09/15/22 0715     Blood Culture, Routine No Growth to date    Narrative:      Aerobic and anaerobic        Latest lactate reviewed, they are-  Recent Labs   Lab 09/14/22 1526 09/14/22 1923   LACTATE 1.6 1.6       Organ dysfunction indicated by Acute respiratory failure  Source- Influenza B    Source control Achieved by- Oxygen, IV fluids, IV antibiotics, IV steroid, Neb treatments      Acute hypoxemic respiratory failure  Patient with Hypoxic Respiratory failure which is Acute.  he is not on home oxygen. Supplemental oxygen was provided and noted- Oxygen Concentration (%):  [36-50] 40.   Signs/symptoms of respiratory failure include- tachypnea, increased work of breathing and use of accessory muscles. Contributing diagnoses includes - Influenza B Labs and images were reviewed. Patient Has not had a recent ABG. Will treat underlying causes and adjust management of respiratory failure as follows- Oxygen, IV steroids, neb treatments, IV abx therapy    Influenza B  Tamaflu      CKD  (chronic kidney disease), stage IV        Type 2 diabetes mellitus with neurologic complication, with long-term current use of insulin  Patient's FSGs are controlled on current medication regimen.  Last A1c reviewed-   Lab Results   Component Value Date    HGBA1C 6.1 (H) 09/14/2022     Most recent fingerstick glucose reviewed-   Recent Labs   Lab 09/14/22  2241 09/15/22  0542   POCTGLUCOSE 278* 274*     Current correctional scale  Low  Maintain anti-hyperglycemic dose as follows-   Antihyperglycemics (From admission, onward)    Start     Stop Route Frequency Ordered    09/14/22 1804  insulin aspart U-100 pen 0-5 Units         -- SubQ Before meals & nightly PRN 09/14/22 1804        Hold Oral hypoglycemics while patient is in the hospital.    Hypertension  Holding all blood pressure medications S/T hypotension.  Monitor and resume PRN.    Hypothyroid  Continue home dose Synthroid      DVT prophylaxis  SCDs, RADHA hose, Lovenox        VTE Risk Mitigation (From admission, onward)         Ordered     enoxaparin injection 40 mg  Daily         09/15/22 1451     IP VTE HIGH RISK PATIENT  Once         09/14/22 1804     Place sequential compression device  Until discontinued         09/14/22 1804     Place RADHA hose  Until discontinued         09/14/22 1804                   Trinity Grady NP  Department of Hospital Medicine   [facility]

## 2022-09-15 NOTE — HPI
Patient is a 62-year-old male, resident of [facility], with history of chronic kidney disease, congestive heart failure, COPD, diabetes, hypertension, hypothyroid, and prior CVA, presents with complaint of fever, low oxygen saturation, and shortness of breath x1 day.  His primary providers on call doctor was contacted the night before admit with regards of fever, shortness of breath, cough, and not feeling well.  She prescribed treatment; however, the symptoms worsened the following day prompting the patient to be taken to the emergency department.  Upon arrival patient found to be hypotensive, tachypneic, and hypoxic.  Patient is diagnosis sepsis with hypoxia secondary to influenza B.  Patient admitted for oxygen, IV fluids, IV antibiotic therapy, IV steroids, and neb treatments. This morning, the patient complains of feeling fatigued and malaise with subjective fever and cough.  He denies shortness of breath and requires supplemental oxygen.

## 2022-09-16 VITALS
TEMPERATURE: 98 F | RESPIRATION RATE: 22 BRPM | WEIGHT: 197.31 LBS | OXYGEN SATURATION: 98 % | DIASTOLIC BLOOD PRESSURE: 68 MMHG | HEIGHT: 69 IN | HEART RATE: 101 BPM | SYSTOLIC BLOOD PRESSURE: 96 MMHG | BODY MASS INDEX: 29.22 KG/M2

## 2022-09-16 LAB
ALBUMIN SERPL BCP-MCNC: 2.2 G/DL (ref 3.5–5.2)
ALP SERPL-CCNC: 72 U/L (ref 55–135)
ALT SERPL W/O P-5'-P-CCNC: 23 U/L (ref 10–44)
ANION GAP SERPL CALC-SCNC: 9 MMOL/L (ref 8–16)
AST SERPL-CCNC: 19 U/L (ref 10–40)
BASOPHILS # BLD AUTO: ABNORMAL K/UL (ref 0–0.2)
BASOPHILS NFR BLD: 0 % (ref 0–1.9)
BILIRUB SERPL-MCNC: 0.4 MG/DL (ref 0.1–1)
BUN SERPL-MCNC: 49 MG/DL (ref 8–23)
CALCIUM SERPL-MCNC: 8.4 MG/DL (ref 8.7–10.5)
CHLORIDE SERPL-SCNC: 110 MMOL/L (ref 95–110)
CO2 SERPL-SCNC: 19 MMOL/L (ref 23–29)
CREAT SERPL-MCNC: 2.5 MG/DL (ref 0.5–1.4)
DIFFERENTIAL METHOD: ABNORMAL
EOSINOPHIL # BLD AUTO: ABNORMAL K/UL (ref 0–0.5)
EOSINOPHIL NFR BLD: 0 % (ref 0–8)
ERYTHROCYTE [DISTWIDTH] IN BLOOD BY AUTOMATED COUNT: 17.2 % (ref 11.5–14.5)
EST. GFR  (NO RACE VARIABLE): 28.3 ML/MIN/1.73 M^2
GLUCOSE SERPL-MCNC: 302 MG/DL (ref 70–110)
HCT VFR BLD AUTO: 27.5 % (ref 40–54)
HGB BLD-MCNC: 8.8 G/DL (ref 14–18)
IMM GRANULOCYTES # BLD AUTO: ABNORMAL K/UL (ref 0–0.04)
IMM GRANULOCYTES NFR BLD AUTO: ABNORMAL % (ref 0–0.5)
LYMPHOCYTES # BLD AUTO: ABNORMAL K/UL (ref 1–4.8)
LYMPHOCYTES NFR BLD: 5 % (ref 18–48)
MAGNESIUM SERPL-MCNC: 1.9 MG/DL (ref 1.6–2.6)
MCH RBC QN AUTO: 25.2 PG (ref 27–31)
MCHC RBC AUTO-ENTMCNC: 32 G/DL (ref 32–36)
MCV RBC AUTO: 79 FL (ref 82–98)
MONOCYTES # BLD AUTO: ABNORMAL K/UL (ref 0.3–1)
MONOCYTES NFR BLD: 2 % (ref 4–15)
NEUTROPHILS NFR BLD: 81 % (ref 38–73)
NEUTS BAND NFR BLD MANUAL: 12 %
NRBC BLD-RTO: 0 /100 WBC
PLATELET # BLD AUTO: 143 K/UL (ref 150–450)
PMV BLD AUTO: 11 FL (ref 9.2–12.9)
POCT GLUCOSE: 310 MG/DL (ref 70–110)
POCT GLUCOSE: 434 MG/DL (ref 70–110)
POTASSIUM SERPL-SCNC: 3.5 MMOL/L (ref 3.5–5.1)
PROT SERPL-MCNC: 6 G/DL (ref 6–8.4)
RBC # BLD AUTO: 3.49 M/UL (ref 4.6–6.2)
SODIUM SERPL-SCNC: 138 MMOL/L (ref 136–145)
WBC # BLD AUTO: 6.38 K/UL (ref 3.9–12.7)

## 2022-09-16 PROCEDURE — 85027 COMPLETE CBC AUTOMATED: CPT | Performed by: NURSE PRACTITIONER

## 2022-09-16 PROCEDURE — 99900035 HC TECH TIME PER 15 MIN (STAT)

## 2022-09-16 PROCEDURE — 85007 BL SMEAR W/DIFF WBC COUNT: CPT | Performed by: NURSE PRACTITIONER

## 2022-09-16 PROCEDURE — 25000003 PHARM REV CODE 250: Performed by: NURSE PRACTITIONER

## 2022-09-16 PROCEDURE — 63600175 PHARM REV CODE 636 W HCPCS: Performed by: NURSE PRACTITIONER

## 2022-09-16 PROCEDURE — 94761 N-INVAS EAR/PLS OXIMETRY MLT: CPT

## 2022-09-16 PROCEDURE — 94640 AIRWAY INHALATION TREATMENT: CPT

## 2022-09-16 PROCEDURE — 25000242 PHARM REV CODE 250 ALT 637 W/ HCPCS: Performed by: INTERNAL MEDICINE

## 2022-09-16 PROCEDURE — 36415 COLL VENOUS BLD VENIPUNCTURE: CPT | Performed by: NURSE PRACTITIONER

## 2022-09-16 PROCEDURE — 80053 COMPREHEN METABOLIC PANEL: CPT | Performed by: NURSE PRACTITIONER

## 2022-09-16 PROCEDURE — 99900031 HC PATIENT EDUCATION (STAT)

## 2022-09-16 PROCEDURE — 27000221 HC OXYGEN, UP TO 24 HOURS

## 2022-09-16 PROCEDURE — 83735 ASSAY OF MAGNESIUM: CPT | Performed by: NURSE PRACTITIONER

## 2022-09-16 RX ORDER — METOPROLOL SUCCINATE 25 MG/1
25 TABLET, EXTENDED RELEASE ORAL DAILY
Status: DISCONTINUED | OUTPATIENT
Start: 2022-09-16 | End: 2022-09-16 | Stop reason: HOSPADM

## 2022-09-16 RX ORDER — LEVOFLOXACIN 750 MG/1
750 TABLET ORAL
Qty: 5 TABLET | Refills: 0 | Status: SHIPPED | OUTPATIENT
Start: 2022-09-16 | End: 2022-09-25

## 2022-09-16 RX ORDER — OSELTAMIVIR PHOSPHATE 6 MG/ML
30 FOR SUSPENSION ORAL 2 TIMES DAILY
Qty: 25 ML | Refills: 0 | Status: SHIPPED | OUTPATIENT
Start: 2022-09-16 | End: 2022-09-18

## 2022-09-16 RX ORDER — PREDNISONE 20 MG/1
TABLET ORAL
Qty: 10 TABLET | Refills: 0 | Status: SHIPPED | OUTPATIENT
Start: 2022-09-16 | End: 2022-09-23

## 2022-09-16 RX ADMIN — IPRATROPIUM BROMIDE 0.5 MG: 0.5 SOLUTION RESPIRATORY (INHALATION) at 07:09

## 2022-09-16 RX ADMIN — LEVOTHYROXINE SODIUM 50 MCG: 0.05 TABLET ORAL at 08:09

## 2022-09-16 RX ADMIN — METHYLPREDNISOLONE SODIUM SUCCINATE 125 MG: 125 INJECTION, POWDER, FOR SOLUTION INTRAMUSCULAR; INTRAVENOUS at 12:09

## 2022-09-16 RX ADMIN — OSELTAMIVIR PHOSPHATE 30 MG: 6 FOR SUSPENSION ORAL at 06:09

## 2022-09-16 RX ADMIN — IPRATROPIUM BROMIDE 0.5 MG: 0.5 SOLUTION RESPIRATORY (INHALATION) at 12:09

## 2022-09-16 RX ADMIN — DOXAZOSIN 2 MG: 2 TABLET ORAL at 08:09

## 2022-09-16 RX ADMIN — ATORVASTATIN CALCIUM 40 MG: 40 TABLET, FILM COATED ORAL at 08:09

## 2022-09-16 RX ADMIN — METHYLPREDNISOLONE SODIUM SUCCINATE 125 MG: 125 INJECTION, POWDER, FOR SOLUTION INTRAMUSCULAR; INTRAVENOUS at 06:09

## 2022-09-16 RX ADMIN — PIPERACILLIN AND TAZOBACTAM 4.5 G: 4; .5 INJECTION, POWDER, LYOPHILIZED, FOR SOLUTION INTRAVENOUS; PARENTERAL at 03:09

## 2022-09-16 RX ADMIN — QUETIAPINE FUMARATE 50 MG: 50 TABLET ORAL at 08:09

## 2022-09-16 RX ADMIN — INSULIN ASPART 4 UNITS: 100 INJECTION, SOLUTION INTRAVENOUS; SUBCUTANEOUS at 06:09

## 2022-09-16 RX ADMIN — FAMOTIDINE 20 MG: 20 TABLET ORAL at 08:09

## 2022-09-16 RX ADMIN — METOPROLOL SUCCINATE 25 MG: 25 TABLET, EXTENDED RELEASE ORAL at 08:09

## 2022-09-16 RX ADMIN — IPRATROPIUM BROMIDE 0.5 MG: 0.5 SOLUTION RESPIRATORY (INHALATION) at 01:09

## 2022-09-16 RX ADMIN — PIPERACILLIN AND TAZOBACTAM 4.5 G: 4; .5 INJECTION, POWDER, LYOPHILIZED, FOR SOLUTION INTRAVENOUS; PARENTERAL at 09:09

## 2022-09-16 NOTE — PLAN OF CARE
Pt being DC to [facility]. Called report to JOSE GUADALUPE Baez. Per Vergi  they will .

## 2022-09-16 NOTE — HOSPITAL COURSE
9/16 SD: Pt's condition improved. He is feeling better. Resumed beta blocker to better control heart rate. Pt requesting to go home. I feel discharge is appropriate at this time to skilled facility with close outpatient follow-up.

## 2022-09-16 NOTE — ASSESSMENT & PLAN NOTE
This patient does have evidence of infective focus  My overall impression is sepsis. Vital signs were reviewed and noted in progress note.  Antibiotics given-   Antibiotics (From admission, onward)    Start     Stop Route Frequency Ordered    09/15/22 1630  vancomycin in dextrose 5 % 1 gram/250 mL IVPB 1,000 mg         -- IV Every 24 hours (non-standard times) 09/14/22 1834    09/14/22 1815  piperacillin-tazobactam 4.5 g in dextrose 5 % 100 mL IVPB (ready to mix system)         -- IV Every 8 hours (non-standard times) 09/14/22 1804    09/14/22 1804  vancomycin - pharmacy to dose  (vancomycin IVPB)        See Hyperspace for full Linked Orders Report.    -- IV pharmacy to manage frequency 09/14/22 1804        Cultures were taken-   Microbiology Results (last 7 days)     Procedure Component Value Units Date/Time    Blood culture x two cultures. Draw prior to antibiotics. [[id number]] Collected: 09/14/22 1526    Order Status: Completed Specimen: Blood Updated: 09/16/22 0613     Blood Culture, Routine No Growth to date      No Growth to date    Narrative:      Aerobic and anaerobic    Blood culture x two cultures. Draw prior to antibiotics. [[id number]] Collected: 09/14/22 1526    Order Status: Completed Specimen: Blood Updated: 09/16/22 0613     Blood Culture, Routine No Growth to date      No Growth to date    Narrative:      Aerobic and anaerobic        Latest lactate reviewed, they are-  Recent Labs   Lab 09/14/22  1526 09/14/22  1923   LACTATE 1.6 1.6       Organ dysfunction indicated by Acute respiratory failure  Source- Influenza B    Source control Achieved by- Oxygen, IV fluids, IV antibiotics, IV steroid, Neb treatments    Discharge home with oxygen, PO antibiotics, PO steroids, and neb treatments

## 2022-09-16 NOTE — DISCHARGE INSTRUCTIONS
Dr. Davila's office will follow-up with you at [facility].    Resume home medications as you were taking prior to hospital admit.    Add Tamiflu tonight and then BID for the next two days.    Add Prednisone 40mg daily x3 days then 20mg daily x4 days.    Add Levaquin every 48 hours x5 doses today.    Discharging on home oxygen 2L NC. Wean as tolerated.    Repeat labs one week after discharge. These have been ordered at the hospital.    If signs and symptoms worsen or new signs and symptoms develop, call your doctor or go to the ED.

## 2022-09-16 NOTE — DISCHARGE SUMMARY
[facility]  Discharge Summary      Patient Name: Pam Gray  MRN: [id number]  Patient Class: IP- Inpatient  Admission Date: 9/14/2022  Hospital Length of Stay: 2 days  Discharge Date and Time:  09/16/2022 9:41 AM  Attending Physician: Bhanu Davila III, MD   Discharging Provider: Trinity Drummond NP  Primary Care Provider: Jacob Zuniga MD      HPI:   Patient is a 62-year-old male, resident of [facility], with history of chronic kidney disease, congestive heart failure, COPD, diabetes, hypertension, hypothyroid, and prior CVA, presents with complaint of fever, low oxygen saturation, and shortness of breath x1 day.  His primary providers on call doctor was contacted the night before admit with regards of fever, shortness of breath, cough, and not feeling well.  She prescribed treatment; however, the symptoms worsened the following day prompting the patient to be taken to the emergency department.  Upon arrival patient found to be hypotensive, tachypneic, and hypoxic.  Patient is diagnosis sepsis with hypoxia secondary to influenza B.  Patient admitted for oxygen, IV fluids, IV antibiotic therapy, IV steroids, and neb treatments. This morning, the patient complains of feeling fatigued and malaise with subjective fever and cough.  He denies shortness of breath and requires supplemental oxygen.          * No surgery found *      Hospital Course:   9/16 SD: Pt's condition improved. He is feeling better. Resumed beta blocker to better control heart rate. Pt requesting to go home. I feel discharge is appropriate at this time to skilled facility with close outpatient follow-up.       Goals of Care Treatment Preferences:  Code Status: Full Code      Consults:   Consults (From admission, onward)        Status Ordering Provider     IP consult to case management  Once        Provider:  (Not yet assigned)    Acknowledged TRINITY DRUMMOND     Pharmacy to dose Vancomycin  consult  Once        Provider:  (Not yet assigned)   See Hyperspace for full Linked Orders Report.    Acknowledged CARA DRUMMOND          * Sepsis  This patient does have evidence of infective focus  My overall impression is sepsis. Vital signs were reviewed and noted in progress note.  Antibiotics given-   Antibiotics (From admission, onward)    Start     Stop Route Frequency Ordered    09/15/22 1630  vancomycin in dextrose 5 % 1 gram/250 mL IVPB 1,000 mg         -- IV Every 24 hours (non-standard times) 09/14/22 1834    09/14/22 1815  piperacillin-tazobactam 4.5 g in dextrose 5 % 100 mL IVPB (ready to mix system)         -- IV Every 8 hours (non-standard times) 09/14/22 1804    09/14/22 1804  vancomycin - pharmacy to dose  (vancomycin IVPB)        See Hyperspace for full Linked Orders Report.    -- IV pharmacy to manage frequency 09/14/22 1804        Cultures were taken-   Microbiology Results (last 7 days)     Procedure Component Value Units Date/Time    Blood culture x two cultures. Draw prior to antibiotics. [[id number]] Collected: 09/14/22 1526    Order Status: Completed Specimen: Blood Updated: 09/16/22 0613     Blood Culture, Routine No Growth to date      No Growth to date    Narrative:      Aerobic and anaerobic    Blood culture x two cultures. Draw prior to antibiotics. [[id number]] Collected: 09/14/22 1526    Order Status: Completed Specimen: Blood Updated: 09/16/22 0613     Blood Culture, Routine No Growth to date      No Growth to date    Narrative:      Aerobic and anaerobic        Latest lactate reviewed, they are-  Recent Labs   Lab 09/14/22  1526 09/14/22  1923   LACTATE 1.6 1.6       Organ dysfunction indicated by Acute respiratory failure  Source- Influenza B    Source control Achieved by- Oxygen, IV fluids, IV antibiotics, IV steroid, Neb treatments    Discharge home with oxygen, PO antibiotics, PO steroids, and neb treatments      Acute hypoxemic respiratory failure  Patient with Hypoxic  Respiratory failure which is Acute.  he is not on home oxygen. Supplemental oxygen was provided and noted- Oxygen Concentration (%):  [32-36] 32.   Signs/symptoms of respiratory failure include- tachypnea, increased work of breathing and use of accessory muscles. Contributing diagnoses includes - Influenza B Labs and images were reviewed. Patient Has not had a recent ABG. Will treat underlying causes and adjust management of respiratory failure as follows- Oxygen, IV steroids, neb treatments, IV abx therapy.    Improved. Discharge with home oxygen, wean as tolerated.    Influenza B  Tamaflu      CKD (chronic kidney disease), stage IV  At baseline      Type 2 diabetes mellitus with neurologic complication, with long-term current use of insulin  Patient's FSGs are controlled on current medication regimen.  Last A1c reviewed-   Lab Results   Component Value Date    HGBA1C 6.1 (H) 09/14/2022     Most recent fingerstick glucose reviewed-   Recent Labs   Lab 09/15/22  1139 09/15/22  1557 09/15/22  2001 09/16/22  0638   POCTGLUCOSE 292* 265* 290* 310*     Current correctional scale  Low  Maintain anti-hyperglycemic dose as follows-   Antihyperglycemics (From admission, onward)    Start     Stop Route Frequency Ordered    09/15/22 2100  insulin detemir U-100 pen 10 Units         -- SubQ Nightly 09/15/22 1451    09/14/22 1804  insulin aspart U-100 pen 0-5 Units         -- SubQ Before meals & nightly PRN 09/14/22 1804        Hold Oral hypoglycemics while patient is in the hospital.    Resume home medications after discharge.    Hypertension  Resume home blood pressure meds    Hypothyroid  Continue home dose Synthroid      DVT prophylaxis  SCDs, RADHA hose, Lovenox while inpatient        Final Active Diagnoses:    Diagnosis Date Noted POA    PRINCIPAL PROBLEM:  Sepsis [A41.9] 04/05/2022 Yes    Acute hypoxemic respiratory failure [J96.01] 09/15/2022 Yes    Influenza B [J10.1] 09/15/2022 Yes    CKD (chronic kidney disease),  stage IV [N18.4] 07/09/2021 Yes    Type 2 diabetes mellitus with neurologic complication, with long-term current use of insulin [E11.49, Z79.4] 09/15/2022 Not Applicable    Hypertension [I10]  Yes    Hypothyroid [E03.9] 04/10/2022 Yes    DVT prophylaxis [Z29.9] 09/15/2022 Not Applicable      Problems Resolved During this Admission:       Discharged Condition: fair    Disposition: Skilled Nursing Facility    Follow Up:   Follow-up Information     Bhanu Davila III, MD Follow up.    Specialty: Internal Medicine  Contact information:  [address] [place] [id number] [phone]                       Patient Instructions:      Comprehensive metabolic panel   Standing Status: Future Standing Exp. Date: 10/16/22     CBC auto differential   Standing Status: Future Standing Exp. Date: 10/16/22     Magnesium   Standing Status: Future Standing Exp. Date: 10/16/22     Diet diabetic     Activity as tolerated       Significant Diagnostic Studies: Labs: All labs within the past 24 hours have been reviewed    Pending Diagnostic Studies:     None         Medications:  Reconciled Home Medications:      Medication List      START taking these medications    levoFLOXacin 750 MG tablet  Commonly known as: LEVAQUIN  Take 1 tablet (750 mg total) by mouth every 48 hours. for 5 doses     oseltamivir 6 mg/mL Susr  Commonly known as: TAMIFLU  Take 5 mLs (30 mg total) by mouth 2 (two) times daily. for 5 doses     predniSONE 20 MG tablet  Commonly known as: DELTASONE  Take 2 tablets (40 mg total) by mouth once daily for 3 days, THEN 1 tablet (20 mg total) once daily for 4 days.  Start taking on: September 16, 2022        CHANGE how you take these medications    clonazePAM 0.5 MG tablet  Commonly known as: KlonoPIN  Take 1 tablet (0.5 mg total) by mouth 3 (three) times daily.  What changed: when to take this        CONTINUE taking these medications    albuterol-ipratropium 2.5 mg-0.5 mg/3 mL nebulizer solution  Commonly known  as: DUO-NEB  Take 3 mLs by nebulization every 6 (six) hours while awake. Rescue     aspirin 81 MG Chew  Take 1 tablet (81 mg total) by mouth once daily.     atorvastatin 40 MG tablet  Commonly known as: LIPITOR  Take 1 tablet (40 mg total) by mouth once daily.     doxazosin 2 MG tablet  Commonly known as: CARDURA  Take 1 tablet (2 mg total) by mouth once daily.     ferrous gluconate 324 MG tablet  Commonly known as: FERGON  Take 1 tablet (324 mg total) by mouth daily with breakfast.     fluticasone furoate-vilanteroL 100-25 mcg/dose diskus inhaler  Commonly known as: BREO  Inhale 1 puff into the lungs once daily. Controller     fluvoxaMINE 25 MG tablet  Commonly known as: LUVOX  Take 1 tablet (25 mg total) by mouth every evening.     insulin aspart U-100 100 unit/mL (3 mL) Inpn pen  Commonly known as: NovoLOG  Inject 1-10 Units into the skin before meals and at bedtime as needed (Hyperglycemia).     lactulose 10 gram packet  Commonly known as: CEPHULAC  Take 1 packet (10 g total) by mouth 2 (two) times daily.     LEVEMIR FLEXTOUCH U-100 INSULN 100 unit/mL (3 mL) Inpn pen  Generic drug: insulin detemir U-100  Inject 10 Units into the skin every evening.     levothyroxine 50 MCG tablet  Commonly known as: SYNTHROID  Take 1 tablet (50 mcg total) by mouth once daily.     metoprolol succinate 25 MG 24 hr tablet  Commonly known as: TOPROL-XL  Take 1 tablet (25 mg total) by mouth once daily.     nicotine 14 mg/24 hr  Commonly known as: NICODERM CQ  Place 1 patch onto the skin once daily.     NIFEdipine 90 MG (OSM) 24 hr tablet  Commonly known as: PROCARDIA-XL  Take 1 tablet (90 mg total) by mouth once daily.     oxyCODONE-acetaminophen 5-325 mg per tablet  Commonly known as: PERCOCET  Take 1 tablet by mouth every 8 (eight) hours as needed for Pain.     pantoprazole 40 MG tablet  Commonly known as: PROTONIX  Take 1 tablet (40 mg total) by mouth once daily.     polyethylene glycol 17 gram Pwpk  Commonly known as:  GLYCOLAX  Take 17 g by mouth once daily.     QUEtiapine 50 MG tablet  Commonly known as: SEROQUEL  Take 1 tablet (50 mg total) by mouth 2 (two) times daily.     senna-docusate 8.6-50 mg 8.6-50 mg per tablet  Commonly known as: PERICOLACE  Take 1 tablet by mouth once daily.     tamsulosin 0.4 mg Cap  Commonly known as: FLOMAX  Take 1 capsule (0.4 mg total) by mouth once daily.     tiotropium bromide 2.5 mcg/actuation inhaler  Commonly known as: SPIRIVA RESPIMAT  Inhale 2 puffs into the lungs once daily. Controller        STOP taking these medications    albuterol 2.5 mg /3 mL (0.083 %) nebulizer solution  Commonly known as: PROVENTIL     amLODIPine 10 MG tablet  Commonly known as: NORVASC     furosemide 80 MG tablet  Commonly known as: LASIX     isosorbide-hydrALAZINE 20-37.5 mg 20-37.5 mg Tab  Commonly known as: BIDIL     metoprolol tartrate 50 MG tablet  Commonly known as: LOPRESSOR     mirtazapine 30 MG tablet  Commonly known as: REMERON     omeprazole 40 MG capsule  Commonly known as: PRILOSEC     pioglitazone 30 MG tablet  Commonly known as: ACTOS     rivastigmine 4.6 mg/24 hour Pt24  Commonly known as: EXELON     SITagliptin 100 MG Tab  Commonly known as: JANUVIA     traZODone 50 MG tablet  Commonly known as: DESYREL            Indwelling Lines/Drains at time of discharge:   Lines/Drains/Airways     None                 Time spent on the discharge of patient: >30 minutes         Trinity Grady NP  Department of Hospital Medicine  [facility]

## 2022-09-16 NOTE — ASSESSMENT & PLAN NOTE
Patient's FSGs are controlled on current medication regimen.  Last A1c reviewed-   Lab Results   Component Value Date    HGBA1C 6.1 (H) 09/14/2022     Most recent fingerstick glucose reviewed-   Recent Labs   Lab 09/15/22  1139 09/15/22  1557 09/15/22  2001 09/16/22  0638   POCTGLUCOSE 292* 265* 290* 310*     Current correctional scale  Low  Maintain anti-hyperglycemic dose as follows-   Antihyperglycemics (From admission, onward)    Start     Stop Route Frequency Ordered    09/15/22 2100  insulin detemir U-100 pen 10 Units         -- SubQ Nightly 09/15/22 1451    09/14/22 1804  insulin aspart U-100 pen 0-5 Units         -- SubQ Before meals & nightly PRN 09/14/22 1804        Hold Oral hypoglycemics while patient is in the hospital.    Resume home medications after discharge.

## 2022-09-16 NOTE — ASSESSMENT & PLAN NOTE
Patient with Hypoxic Respiratory failure which is Acute.  he is not on home oxygen. Supplemental oxygen was provided and noted- Oxygen Concentration (%):  [32-36] 32.   Signs/symptoms of respiratory failure include- tachypnea, increased work of breathing and use of accessory muscles. Contributing diagnoses includes - Influenza B Labs and images were reviewed. Patient Has not had a recent ABG. Will treat underlying causes and adjust management of respiratory failure as follows- Oxygen, IV steroids, neb treatments, IV abx therapy.    Improved. Discharge with home oxygen, wean as tolerated.

## 2022-09-20 LAB
BACTERIA BLD CULT: NORMAL
BACTERIA BLD CULT: NORMAL

## 2022-12-19 PROBLEM — J96.01 ACUTE HYPOXEMIC RESPIRATORY FAILURE: Status: RESOLVED | Noted: 2022-09-15 | Resolved: 2022-12-19

## 2022-12-19 PROBLEM — A41.9 SEPSIS: Status: RESOLVED | Noted: 2022-04-05 | Resolved: 2022-12-19

## 2023-01-16 ENCOUNTER — CLINICAL SUPPORT (OUTPATIENT)
Dept: CARDIOLOGY | Facility: HOSPITAL | Age: 63
DRG: 291 | End: 2023-01-16
Payer: MEDICARE

## 2023-01-16 ENCOUNTER — HOSPITAL ENCOUNTER (INPATIENT)
Facility: HOSPITAL | Age: 63
LOS: 5 days | Discharge: PSYCHIATRIC OR SUBSTANCE USE FACILITY | DRG: 291 | End: 2023-01-24
Attending: EMERGENCY MEDICINE | Admitting: INTERNAL MEDICINE
Payer: MEDICARE

## 2023-01-16 VITALS — BODY MASS INDEX: 29.62 KG/M2 | WEIGHT: 200 LBS | HEIGHT: 69 IN

## 2023-01-16 DIAGNOSIS — R09.02 HYPOXIA: ICD-10-CM

## 2023-01-16 DIAGNOSIS — I50.43: ICD-10-CM

## 2023-01-16 DIAGNOSIS — R07.9 CHEST PAIN: ICD-10-CM

## 2023-01-16 DIAGNOSIS — J96.01 ACUTE RESPIRATORY FAILURE WITH HYPOXIA: ICD-10-CM

## 2023-01-16 DIAGNOSIS — R79.89 ELEVATED TROPONIN: ICD-10-CM

## 2023-01-16 DIAGNOSIS — N17.9 AKI (ACUTE KIDNEY INJURY): ICD-10-CM

## 2023-01-16 DIAGNOSIS — I50.9 CONGESTIVE HEART FAILURE, UNSPECIFIED HF CHRONICITY, UNSPECIFIED HEART FAILURE TYPE: Primary | ICD-10-CM

## 2023-01-16 PROBLEM — E87.1 HYPONATREMIA: Status: ACTIVE | Noted: 2023-01-16

## 2023-01-16 LAB
ALBUMIN SERPL BCP-MCNC: 3.2 G/DL (ref 3.5–5.2)
ALBUMIN SERPL BCP-MCNC: 3.2 G/DL (ref 3.5–5.2)
ALLENS TEST: ABNORMAL
ALP SERPL-CCNC: 63 U/L (ref 55–135)
ALT SERPL W/O P-5'-P-CCNC: 22 U/L (ref 10–44)
AMPHET+METHAMPHET UR QL: ABNORMAL
ANION GAP SERPL CALC-SCNC: 12 MMOL/L (ref 8–16)
ANION GAP SERPL CALC-SCNC: 9 MMOL/L (ref 8–16)
ANION GAP SERPL CALC-SCNC: 9 MMOL/L (ref 8–16)
AST SERPL-CCNC: 21 U/L (ref 10–40)
AV INDEX (PROSTH): 0.28
AV MEAN GRADIENT: 45 MMHG
AV PEAK GRADIENT: 71 MMHG
AV REGURGITATION PRESSURE HALF TIME: 276.02 MS
AV VALVE AREA: 0.98 CM2
AV VELOCITY RATIO: 0.29
BACTERIA #/AREA URNS HPF: NEGATIVE /HPF
BARBITURATES UR QL SCN>200 NG/ML: NEGATIVE
BASOPHILS # BLD AUTO: 0.02 K/UL (ref 0–0.2)
BASOPHILS # BLD AUTO: 0.03 K/UL (ref 0–0.2)
BASOPHILS NFR BLD: 0.3 % (ref 0–1.9)
BASOPHILS NFR BLD: 0.4 % (ref 0–1.9)
BENZODIAZ UR QL SCN>200 NG/ML: NEGATIVE
BILIRUB SERPL-MCNC: 0.7 MG/DL (ref 0.1–1)
BILIRUB UR QL STRIP: NEGATIVE
BNP SERPL-MCNC: 1118 PG/ML (ref 0–99)
BNP SERPL-MCNC: 1225 PG/ML (ref 0–99)
BSA FOR ECHO PROCEDURE: 2.1 M2
BUN SERPL-MCNC: 61 MG/DL (ref 8–23)
BUN SERPL-MCNC: 62 MG/DL (ref 8–23)
BUN SERPL-MCNC: 65 MG/DL (ref 8–23)
BZE UR QL SCN: NEGATIVE
CALCIUM SERPL-MCNC: 8.4 MG/DL (ref 8.7–10.5)
CALCIUM SERPL-MCNC: 8.4 MG/DL (ref 8.7–10.5)
CALCIUM SERPL-MCNC: 8.8 MG/DL (ref 8.7–10.5)
CANNABINOIDS UR QL SCN: NEGATIVE
CHLORIDE SERPL-SCNC: 101 MMOL/L (ref 95–110)
CHLORIDE SERPL-SCNC: 101 MMOL/L (ref 95–110)
CHLORIDE SERPL-SCNC: 102 MMOL/L (ref 95–110)
CK SERPL-CCNC: 43 U/L (ref 20–200)
CLARITY UR: CLEAR
CO2 SERPL-SCNC: 21 MMOL/L (ref 23–29)
CO2 SERPL-SCNC: 22 MMOL/L (ref 23–29)
CO2 SERPL-SCNC: 23 MMOL/L (ref 23–29)
COLOR UR: YELLOW
CREAT SERPL-MCNC: 3.1 MG/DL (ref 0.5–1.4)
CREAT SERPL-MCNC: 3.2 MG/DL (ref 0.5–1.4)
CREAT SERPL-MCNC: 3.2 MG/DL (ref 0.5–1.4)
CREAT UR-MCNC: 74 MG/DL (ref 23–375)
CV ECHO LV RWT: 0.7 CM
DELSYS: ABNORMAL
DIFFERENTIAL METHOD: ABNORMAL
DIFFERENTIAL METHOD: ABNORMAL
DOP CALC AO PEAK VEL: 4.2 M/S
DOP CALC AO VTI: 95.3 CM
DOP CALC LVOT AREA: 3.5 CM2
DOP CALC LVOT DIAMETER: 2.1 CM
DOP CALC LVOT PEAK VEL: 1.23 M/S
DOP CALC LVOT STROKE VOLUME: 93.12 CM3
DOP CALCLVOT PEAK VEL VTI: 26.9 CM
E WAVE DECELERATION TIME: 256.18 MSEC
E/A RATIO: 1.84
E/E' RATIO: 23.43 M/S
ECHO LV POSTERIOR WALL: 1.79 CM (ref 0.6–1.1)
EJECTION FRACTION: 60 %
EOSINOPHIL # BLD AUTO: 0.1 K/UL (ref 0–0.5)
EOSINOPHIL # BLD AUTO: 0.1 K/UL (ref 0–0.5)
EOSINOPHIL NFR BLD: 0.9 % (ref 0–8)
EOSINOPHIL NFR BLD: 1 % (ref 0–8)
ERYTHROCYTE [DISTWIDTH] IN BLOOD BY AUTOMATED COUNT: 16.1 % (ref 11.5–14.5)
ERYTHROCYTE [DISTWIDTH] IN BLOOD BY AUTOMATED COUNT: 16.2 % (ref 11.5–14.5)
EST. GFR  (NO RACE VARIABLE): 21.1 ML/MIN/1.73 M^2
EST. GFR  (NO RACE VARIABLE): 21.1 ML/MIN/1.73 M^2
EST. GFR  (NO RACE VARIABLE): 21.9 ML/MIN/1.73 M^2
FLOW: 5
FRACTIONAL SHORTENING: 30 % (ref 28–44)
GLUCOSE SERPL-MCNC: 115 MG/DL (ref 70–110)
GLUCOSE SERPL-MCNC: 126 MG/DL (ref 70–110)
GLUCOSE SERPL-MCNC: 135 MG/DL (ref 70–110)
GLUCOSE UR QL STRIP: NEGATIVE
HCO3 UR-SCNC: 20.2 MMOL/L (ref 24–28)
HCT VFR BLD AUTO: 31.9 % (ref 40–54)
HCT VFR BLD AUTO: 34 % (ref 40–54)
HGB BLD-MCNC: 10.4 G/DL (ref 14–18)
HGB BLD-MCNC: 9.7 G/DL (ref 14–18)
HGB UR QL STRIP: NEGATIVE
HR MV ECHO: 76 BPM
HYALINE CASTS #/AREA URNS LPF: 5 /LPF
IMM GRANULOCYTES # BLD AUTO: 0.03 K/UL (ref 0–0.04)
IMM GRANULOCYTES # BLD AUTO: 0.05 K/UL (ref 0–0.04)
IMM GRANULOCYTES NFR BLD AUTO: 0.4 % (ref 0–0.5)
IMM GRANULOCYTES NFR BLD AUTO: 0.6 % (ref 0–0.5)
INFLUENZA A, MOLECULAR: NEGATIVE
INFLUENZA B, MOLECULAR: NEGATIVE
INTERVENTRICULAR SEPTUM: 1.35 CM (ref 0.6–1.1)
KETONES UR QL STRIP: NEGATIVE
LACTATE SERPL-SCNC: 1.3 MMOL/L (ref 0.5–1.9)
LEFT ATRIUM SIZE: 4.35 CM
LEFT ATRIUM VOLUME INDEX MOD: 45.4 ML/M2
LEFT ATRIUM VOLUME MOD: 93.96 CM3
LEFT INTERNAL DIMENSION IN SYSTOLE: 3.58 CM (ref 2.1–4)
LEFT VENTRICLE DIASTOLIC VOLUME INDEX: 60.29 ML/M2
LEFT VENTRICLE DIASTOLIC VOLUME: 124.8 ML
LEFT VENTRICLE MASS INDEX: 173 G/M2
LEFT VENTRICLE SYSTOLIC VOLUME INDEX: 26 ML/M2
LEFT VENTRICLE SYSTOLIC VOLUME: 53.77 ML
LEFT VENTRICULAR INTERNAL DIMENSION IN DIASTOLE: 5.12 CM (ref 3.5–6)
LEFT VENTRICULAR MASS: 357.81 G
LEUKOCYTE ESTERASE UR QL STRIP: ABNORMAL
LV LATERAL E/E' RATIO: 18.22 M/S
LV SEPTAL E/E' RATIO: 32.8 M/S
LVOT MG: 3.25 MMHG
LVOT MV: 0.84 CM/S
LYMPHOCYTES # BLD AUTO: 0.8 K/UL (ref 1–4.8)
LYMPHOCYTES # BLD AUTO: 1.2 K/UL (ref 1–4.8)
LYMPHOCYTES NFR BLD: 11.6 % (ref 18–48)
LYMPHOCYTES NFR BLD: 14.3 % (ref 18–48)
MAGNESIUM SERPL-MCNC: 2.6 MG/DL (ref 1.6–2.6)
MCH RBC QN AUTO: 23.8 PG (ref 27–31)
MCH RBC QN AUTO: 24.1 PG (ref 27–31)
MCHC RBC AUTO-ENTMCNC: 30.4 G/DL (ref 32–36)
MCHC RBC AUTO-ENTMCNC: 30.6 G/DL (ref 32–36)
MCV RBC AUTO: 78 FL (ref 82–98)
MCV RBC AUTO: 79 FL (ref 82–98)
MICROSCOPIC COMMENT: ABNORMAL
MODE: ABNORMAL
MONOCYTES # BLD AUTO: 0.7 K/UL (ref 0.3–1)
MONOCYTES # BLD AUTO: 0.8 K/UL (ref 0.3–1)
MONOCYTES NFR BLD: 9.7 % (ref 4–15)
MONOCYTES NFR BLD: 9.8 % (ref 4–15)
MV MEAN GRADIENT: 6 MMHG
MV PEAK A VEL: 0.89 M/S
MV PEAK E VEL: 1.64 M/S
MV STENOSIS PRESSURE HALF TIME: 74.29 MS
MV VALVE AREA P 1/2 METHOD: 2.96 CM2
NEUTROPHILS # BLD AUTO: 5.5 K/UL (ref 1.8–7.7)
NEUTROPHILS # BLD AUTO: 6 K/UL (ref 1.8–7.7)
NEUTROPHILS NFR BLD: 74.1 % (ref 38–73)
NEUTROPHILS NFR BLD: 76.9 % (ref 38–73)
NITRITE UR QL STRIP: NEGATIVE
NRBC BLD-RTO: 0 /100 WBC
NRBC BLD-RTO: 0 /100 WBC
OPIATES UR QL SCN: NEGATIVE
PCO2 BLDA: 38.9 MMHG (ref 35–45)
PCP UR QL SCN>25 NG/ML: NEGATIVE
PH SMN: 7.32 [PH] (ref 7.35–7.45)
PH UR STRIP: 6 [PH] (ref 5–8)
PHOSPHATE SERPL-MCNC: 4.6 MG/DL (ref 2.7–4.5)
PISA MRMAX VEL: 4.86 M/S
PISA TR MAX VEL: 1.82 M/S
PLATELET # BLD AUTO: 127 K/UL (ref 150–450)
PLATELET # BLD AUTO: 166 K/UL (ref 150–450)
PMV BLD AUTO: 10.4 FL (ref 9.2–12.9)
PMV BLD AUTO: 10.9 FL (ref 9.2–12.9)
PO2 BLDA: 60 MMHG (ref 80–100)
POC BE: -6 MMOL/L
POC SATURATED O2: 89 % (ref 95–100)
POC TCO2: 21 MMOL/L (ref 23–27)
POTASSIUM SERPL-SCNC: 4.6 MMOL/L (ref 3.5–5.1)
POTASSIUM SERPL-SCNC: 5.7 MMOL/L (ref 3.5–5.1)
POTASSIUM SERPL-SCNC: 5.7 MMOL/L (ref 3.5–5.1)
PROT SERPL-MCNC: 6.5 G/DL (ref 6–8.4)
PROT UR QL STRIP: ABNORMAL
PV MV: 0.79 M/S
PV PEAK VELOCITY: 1.09 CM/S
RA PRESSURE: 3 MMHG
RBC # BLD AUTO: 4.08 M/UL (ref 4.6–6.2)
RBC # BLD AUTO: 4.31 M/UL (ref 4.6–6.2)
RBC #/AREA URNS HPF: 1 /HPF (ref 0–4)
RV TISSUE DOPPLER FREE WALL SYSTOLIC VELOCITY 1 (APICAL 4 CHAMBER VIEW): 0.01 CM/S
SAMPLE: ABNORMAL
SARS-COV-2 RDRP RESP QL NAA+PROBE: NEGATIVE
SITE: ABNORMAL
SODIUM SERPL-SCNC: 132 MMOL/L (ref 136–145)
SODIUM SERPL-SCNC: 132 MMOL/L (ref 136–145)
SODIUM SERPL-SCNC: 136 MMOL/L (ref 136–145)
SP GR UR STRIP: 1.01 (ref 1–1.03)
SPECIMEN SOURCE: NORMAL
SQUAMOUS #/AREA URNS HPF: 2 /HPF
STJ: 1.71 CM
TDI LATERAL: 0.09 M/S
TDI SEPTAL: 0.05 M/S
TDI: 0.07 M/S
TOXICOLOGY INFORMATION: ABNORMAL
TR MAX PG: 13 MMHG
TRICUSPID ANNULAR PLANE SYSTOLIC EXCURSION: 2.25 CM
TROPONIN I SERPL HS-MCNC: 112.1 PG/ML (ref 0–14.9)
TROPONIN I SERPL HS-MCNC: 143.1 PG/ML (ref 0–14.9)
TROPONIN I SERPL HS-MCNC: 73.7 PG/ML (ref 0–14.9)
TV REST PULMONARY ARTERY PRESSURE: 16 MMHG
URN SPEC COLLECT METH UR: ABNORMAL
UROBILINOGEN UR STRIP-ACNC: NEGATIVE EU/DL
WBC # BLD AUTO: 7.16 K/UL (ref 3.9–12.7)
WBC # BLD AUTO: 8.03 K/UL (ref 3.9–12.7)
WBC #/AREA URNS HPF: 14 /HPF (ref 0–5)

## 2023-01-16 PROCEDURE — 93010 ELECTROCARDIOGRAM REPORT: CPT | Mod: 76,,, | Performed by: SPECIALIST

## 2023-01-16 PROCEDURE — 81001 URINALYSIS AUTO W/SCOPE: CPT | Performed by: INTERNAL MEDICINE

## 2023-01-16 PROCEDURE — 94660 CPAP INITIATION&MGMT: CPT

## 2023-01-16 PROCEDURE — 93005 ELECTROCARDIOGRAM TRACING: CPT | Performed by: SPECIALIST

## 2023-01-16 PROCEDURE — U0002 COVID-19 LAB TEST NON-CDC: HCPCS | Performed by: EMERGENCY MEDICINE

## 2023-01-16 PROCEDURE — 93010 ELECTROCARDIOGRAM REPORT: CPT | Mod: ,,, | Performed by: SPECIALIST

## 2023-01-16 PROCEDURE — 25000242 PHARM REV CODE 250 ALT 637 W/ HCPCS: Performed by: EMERGENCY MEDICINE

## 2023-01-16 PROCEDURE — 85025 COMPLETE CBC W/AUTO DIFF WBC: CPT | Mod: 91 | Performed by: STUDENT IN AN ORGANIZED HEALTH CARE EDUCATION/TRAINING PROGRAM

## 2023-01-16 PROCEDURE — 96365 THER/PROPH/DIAG IV INF INIT: CPT

## 2023-01-16 PROCEDURE — 82962 GLUCOSE BLOOD TEST: CPT

## 2023-01-16 PROCEDURE — 80069 RENAL FUNCTION PANEL: CPT | Performed by: EMERGENCY MEDICINE

## 2023-01-16 PROCEDURE — 83605 ASSAY OF LACTIC ACID: CPT | Performed by: NURSE PRACTITIONER

## 2023-01-16 PROCEDURE — G0378 HOSPITAL OBSERVATION PER HR: HCPCS

## 2023-01-16 PROCEDURE — 82803 BLOOD GASES ANY COMBINATION: CPT

## 2023-01-16 PROCEDURE — 93306 ECHO (CUPID ONLY): ICD-10-PCS | Mod: 26,,, | Performed by: INTERNAL MEDICINE

## 2023-01-16 PROCEDURE — 83880 ASSAY OF NATRIURETIC PEPTIDE: CPT | Performed by: EMERGENCY MEDICINE

## 2023-01-16 PROCEDURE — 36415 COLL VENOUS BLD VENIPUNCTURE: CPT | Performed by: NURSE PRACTITIONER

## 2023-01-16 PROCEDURE — 99285 EMERGENCY DEPT VISIT HI MDM: CPT | Mod: 25

## 2023-01-16 PROCEDURE — 99900035 HC TECH TIME PER 15 MIN (STAT)

## 2023-01-16 PROCEDURE — 93306 TTE W/DOPPLER COMPLETE: CPT

## 2023-01-16 PROCEDURE — 84484 ASSAY OF TROPONIN QUANT: CPT | Performed by: EMERGENCY MEDICINE

## 2023-01-16 PROCEDURE — 84484 ASSAY OF TROPONIN QUANT: CPT | Mod: 91 | Performed by: NURSE PRACTITIONER

## 2023-01-16 PROCEDURE — 80307 DRUG TEST PRSMV CHEM ANLYZR: CPT | Performed by: STUDENT IN AN ORGANIZED HEALTH CARE EDUCATION/TRAINING PROGRAM

## 2023-01-16 PROCEDURE — 36600 WITHDRAWAL OF ARTERIAL BLOOD: CPT

## 2023-01-16 PROCEDURE — 27000221 HC OXYGEN, UP TO 24 HOURS

## 2023-01-16 PROCEDURE — 87502 INFLUENZA DNA AMP PROBE: CPT | Performed by: EMERGENCY MEDICINE

## 2023-01-16 PROCEDURE — 96366 THER/PROPH/DIAG IV INF ADDON: CPT

## 2023-01-16 PROCEDURE — 25000242 PHARM REV CODE 250 ALT 637 W/ HCPCS: Performed by: STUDENT IN AN ORGANIZED HEALTH CARE EDUCATION/TRAINING PROGRAM

## 2023-01-16 PROCEDURE — 80048 BASIC METABOLIC PNL TOTAL CA: CPT | Performed by: NURSE PRACTITIONER

## 2023-01-16 PROCEDURE — 25000003 PHARM REV CODE 250: Performed by: EMERGENCY MEDICINE

## 2023-01-16 PROCEDURE — 80053 COMPREHEN METABOLIC PANEL: CPT | Performed by: EMERGENCY MEDICINE

## 2023-01-16 PROCEDURE — 83036 HEMOGLOBIN GLYCOSYLATED A1C: CPT | Performed by: STUDENT IN AN ORGANIZED HEALTH CARE EDUCATION/TRAINING PROGRAM

## 2023-01-16 PROCEDURE — 94761 N-INVAS EAR/PLS OXIMETRY MLT: CPT

## 2023-01-16 PROCEDURE — 85025 COMPLETE CBC W/AUTO DIFF WBC: CPT | Performed by: EMERGENCY MEDICINE

## 2023-01-16 PROCEDURE — 93306 TTE W/DOPPLER COMPLETE: CPT | Mod: 26,,, | Performed by: INTERNAL MEDICINE

## 2023-01-16 PROCEDURE — 94640 AIRWAY INHALATION TREATMENT: CPT

## 2023-01-16 PROCEDURE — 36415 COLL VENOUS BLD VENIPUNCTURE: CPT | Performed by: EMERGENCY MEDICINE

## 2023-01-16 PROCEDURE — 83735 ASSAY OF MAGNESIUM: CPT | Performed by: NURSE PRACTITIONER

## 2023-01-16 PROCEDURE — 83880 ASSAY OF NATRIURETIC PEPTIDE: CPT | Mod: 91 | Performed by: NURSE PRACTITIONER

## 2023-01-16 PROCEDURE — 93010 EKG 12-LEAD: ICD-10-PCS | Mod: 76,,, | Performed by: SPECIALIST

## 2023-01-16 PROCEDURE — 82550 ASSAY OF CK (CPK): CPT | Performed by: NURSE PRACTITIONER

## 2023-01-16 PROCEDURE — 96375 TX/PRO/DX INJ NEW DRUG ADDON: CPT

## 2023-01-16 PROCEDURE — 96376 TX/PRO/DX INJ SAME DRUG ADON: CPT

## 2023-01-16 PROCEDURE — 36415 COLL VENOUS BLD VENIPUNCTURE: CPT | Performed by: STUDENT IN AN ORGANIZED HEALTH CARE EDUCATION/TRAINING PROGRAM

## 2023-01-16 PROCEDURE — 63600175 PHARM REV CODE 636 W HCPCS: Performed by: INTERNAL MEDICINE

## 2023-01-16 RX ORDER — FUROSEMIDE 10 MG/ML
60 INJECTION INTRAMUSCULAR; INTRAVENOUS ONCE
Status: COMPLETED | OUTPATIENT
Start: 2023-01-16 | End: 2023-01-16

## 2023-01-16 RX ORDER — IPRATROPIUM BROMIDE 0.5 MG/2.5ML
0.5 SOLUTION RESPIRATORY (INHALATION) EVERY 6 HOURS
Status: DISCONTINUED | OUTPATIENT
Start: 2023-01-16 | End: 2023-01-17

## 2023-01-16 RX ORDER — CLONAZEPAM 0.5 MG/1
0.5 TABLET ORAL 2 TIMES DAILY PRN
Status: DISCONTINUED | OUTPATIENT
Start: 2023-01-16 | End: 2023-01-24 | Stop reason: HOSPADM

## 2023-01-16 RX ORDER — DOXAZOSIN 1 MG/1
2 TABLET ORAL DAILY
Status: DISCONTINUED | OUTPATIENT
Start: 2023-01-16 | End: 2023-01-24 | Stop reason: HOSPADM

## 2023-01-16 RX ORDER — FUROSEMIDE 10 MG/ML
40 INJECTION INTRAMUSCULAR; INTRAVENOUS ONCE
Status: COMPLETED | OUTPATIENT
Start: 2023-01-16 | End: 2023-01-16

## 2023-01-16 RX ORDER — GLUCAGON 1 MG
1 KIT INJECTION
Status: DISCONTINUED | OUTPATIENT
Start: 2023-01-16 | End: 2023-01-24 | Stop reason: HOSPADM

## 2023-01-16 RX ORDER — LEVOTHYROXINE SODIUM 25 UG/1
50 TABLET ORAL
Status: DISCONTINUED | OUTPATIENT
Start: 2023-01-17 | End: 2023-01-24 | Stop reason: HOSPADM

## 2023-01-16 RX ORDER — GABAPENTIN 300 MG/1
1 CAPSULE ORAL DAILY
COMMUNITY
Start: 2023-01-11 | End: 2023-07-25 | Stop reason: SDUPTHER

## 2023-01-16 RX ORDER — NAPROXEN SODIUM 220 MG/1
81 TABLET, FILM COATED ORAL DAILY
Status: DISCONTINUED | OUTPATIENT
Start: 2023-01-16 | End: 2023-01-24 | Stop reason: HOSPADM

## 2023-01-16 RX ORDER — NIFEDIPINE 30 MG/1
90 TABLET, EXTENDED RELEASE ORAL DAILY
Status: DISCONTINUED | OUTPATIENT
Start: 2023-01-16 | End: 2023-01-24 | Stop reason: HOSPADM

## 2023-01-16 RX ORDER — TAMSULOSIN HYDROCHLORIDE 0.4 MG/1
0.4 CAPSULE ORAL DAILY
Status: DISCONTINUED | OUTPATIENT
Start: 2023-01-16 | End: 2023-01-24 | Stop reason: HOSPADM

## 2023-01-16 RX ORDER — FLUVOXAMINE MALEATE 25 MG/1
25 TABLET ORAL NIGHTLY
Status: DISCONTINUED | OUTPATIENT
Start: 2023-01-16 | End: 2023-01-24

## 2023-01-16 RX ORDER — QUETIAPINE FUMARATE 25 MG/1
50 TABLET, FILM COATED ORAL 2 TIMES DAILY
Status: DISCONTINUED | OUTPATIENT
Start: 2023-01-16 | End: 2023-01-24

## 2023-01-16 RX ORDER — ATORVASTATIN CALCIUM 40 MG/1
40 TABLET, FILM COATED ORAL DAILY
Status: DISCONTINUED | OUTPATIENT
Start: 2023-01-16 | End: 2023-01-24 | Stop reason: HOSPADM

## 2023-01-16 RX ORDER — IPRATROPIUM BROMIDE AND ALBUTEROL SULFATE 2.5; .5 MG/3ML; MG/3ML
3 SOLUTION RESPIRATORY (INHALATION)
Status: COMPLETED | OUTPATIENT
Start: 2023-01-16 | End: 2023-01-16

## 2023-01-16 RX ORDER — NALOXONE HCL 0.4 MG/ML
0.02 VIAL (ML) INJECTION
Status: DISCONTINUED | OUTPATIENT
Start: 2023-01-16 | End: 2023-01-24 | Stop reason: HOSPADM

## 2023-01-16 RX ORDER — FLUTICASONE FUROATE AND VILANTEROL 100; 25 UG/1; UG/1
1 POWDER RESPIRATORY (INHALATION) DAILY
Status: DISCONTINUED | OUTPATIENT
Start: 2023-01-16 | End: 2023-01-16

## 2023-01-16 RX ORDER — CALCIUM GLUCONATE 20 MG/ML
1 INJECTION, SOLUTION INTRAVENOUS
Status: COMPLETED | OUTPATIENT
Start: 2023-01-16 | End: 2023-01-16

## 2023-01-16 RX ORDER — SODIUM BICARBONATE 1 MEQ/ML
50 SYRINGE (ML) INTRAVENOUS
Status: COMPLETED | OUTPATIENT
Start: 2023-01-16 | End: 2023-01-16

## 2023-01-16 RX ORDER — ARFORMOTEROL TARTRATE 15 UG/2ML
15 SOLUTION RESPIRATORY (INHALATION) 2 TIMES DAILY
Status: DISCONTINUED | OUTPATIENT
Start: 2023-01-16 | End: 2023-01-17

## 2023-01-16 RX ORDER — POLYETHYLENE GLYCOL 3350 17 G/17G
17 POWDER, FOR SOLUTION ORAL DAILY
Status: DISCONTINUED | OUTPATIENT
Start: 2023-01-16 | End: 2023-01-24 | Stop reason: HOSPADM

## 2023-01-16 RX ORDER — CLONAZEPAM 0.5 MG/1
0.5 TABLET ORAL 2 TIMES DAILY PRN
COMMUNITY
End: 2023-07-28 | Stop reason: ALTCHOICE

## 2023-01-16 RX ORDER — METOPROLOL SUCCINATE 25 MG/1
25 TABLET, EXTENDED RELEASE ORAL DAILY
Status: DISCONTINUED | OUTPATIENT
Start: 2023-01-16 | End: 2023-01-24 | Stop reason: HOSPADM

## 2023-01-16 RX ORDER — SODIUM CHLORIDE 0.9 % (FLUSH) 0.9 %
10 SYRINGE (ML) INJECTION EVERY 12 HOURS PRN
Status: DISCONTINUED | OUTPATIENT
Start: 2023-01-16 | End: 2023-01-24 | Stop reason: HOSPADM

## 2023-01-16 RX ORDER — IBUPROFEN 200 MG
24 TABLET ORAL
Status: DISCONTINUED | OUTPATIENT
Start: 2023-01-16 | End: 2023-01-24 | Stop reason: HOSPADM

## 2023-01-16 RX ORDER — PANTOPRAZOLE SODIUM 40 MG/1
40 TABLET, DELAYED RELEASE ORAL
Status: DISCONTINUED | OUTPATIENT
Start: 2023-01-17 | End: 2023-01-24 | Stop reason: HOSPADM

## 2023-01-16 RX ORDER — IBUPROFEN 200 MG
16 TABLET ORAL
Status: DISCONTINUED | OUTPATIENT
Start: 2023-01-16 | End: 2023-01-24 | Stop reason: HOSPADM

## 2023-01-16 RX ORDER — INSULIN ASPART 100 [IU]/ML
0-5 INJECTION, SOLUTION INTRAVENOUS; SUBCUTANEOUS
Status: DISCONTINUED | OUTPATIENT
Start: 2023-01-16 | End: 2023-01-24 | Stop reason: HOSPADM

## 2023-01-16 RX ORDER — BUDESONIDE 0.5 MG/2ML
0.5 INHALANT ORAL EVERY 12 HOURS
Status: DISCONTINUED | OUTPATIENT
Start: 2023-01-16 | End: 2023-01-17

## 2023-01-16 RX ADMIN — IPRATROPIUM BROMIDE 0.5 MG: 0.5 SOLUTION RESPIRATORY (INHALATION) at 07:01

## 2023-01-16 RX ADMIN — FUROSEMIDE 40 MG: 10 INJECTION, SOLUTION INTRAMUSCULAR; INTRAVENOUS at 01:01

## 2023-01-16 RX ADMIN — BUDESONIDE 0.5 MG: 0.5 INHALANT RESPIRATORY (INHALATION) at 07:01

## 2023-01-16 RX ADMIN — IPRATROPIUM BROMIDE AND ALBUTEROL SULFATE 3 ML: 2.5; .5 SOLUTION RESPIRATORY (INHALATION) at 01:01

## 2023-01-16 RX ADMIN — ARFORMOTEROL TARTRATE 15 MCG: 15 SOLUTION RESPIRATORY (INHALATION) at 08:01

## 2023-01-16 RX ADMIN — SODIUM BICARBONATE 50 MEQ: 84 INJECTION, SOLUTION INTRAVENOUS at 01:01

## 2023-01-16 RX ADMIN — SODIUM ZIRCONIUM CYCLOSILICATE 5 G: 5 POWDER, FOR SUSPENSION ORAL at 01:01

## 2023-01-16 RX ADMIN — CALCIUM GLUCONATE 1 G: 20 INJECTION, SOLUTION INTRAVENOUS at 01:01

## 2023-01-16 RX ADMIN — FUROSEMIDE 60 MG: 10 INJECTION, SOLUTION INTRAMUSCULAR; INTRAVENOUS at 07:01

## 2023-01-16 NOTE — CONSULTS
Nephrology Consult Note        Patient Name: Pam Gray  MRN: [id number]    Patient Class: Emergency   Admission Date: 1/16/2023  Length of Stay: 0 days  Date of Service: 1/16/2023    Attending Physician: Alpesh Feng MD  Primary Care Provider: Jacob Zuniga MD    Reason for Consult: sob/brea/hyperkalemia/hyponatremia/acidosis/chf/anemia/thn    SUBJECTIVE:     HPI: 62M with past medical history of anxiety, arthritis, CHF, CKD stage 3, COPD, CAD, diabetes mellitus, peripheral neuropathy, CVA presented initially on 1/10 to the emergency department at [facility] in [place], [place] via ambulance with complaints of SI and depression. He was sent PECed to [facility] and now brought to [facility] with SOB. Labs show BREA, hyperkalemia. ABG with slight acidosis.    Past Medical History:   Diagnosis Date    Anxiety     Arthritis     CHF (congestive heart failure)     Chronic kidney disease     COPD (chronic obstructive pulmonary disease)     Coronary artery disease     Diabetes mellitus     Erectile dysfunction     Hypertension     Necrotizing fasciitis of forearm     Peripheral neuropathy     Stroke     TIA    Thyroid disease      Past Surgical History:   Procedure Laterality Date    arm surgery Right     arthroscopy Right     knee    COLONOSCOPY N/A 7/3/2019    Procedure: COLONOSCOPY;  Surgeon: Jagdish Pollock MD;  Location: [facility] ENDO;  Service: Endoscopy;  Laterality: N/A;    ESOPHAGOGASTRODUODENOSCOPY N/A 7/3/2019    Procedure: ESOPHAGOGASTRODUODENOSCOPY (EGD);  Surgeon: Jagdish Pollock MD;  Location: [facility] ENDO;  Service: Endoscopy;  Laterality: N/A;    EYE SURGERY      FUSION, SPINE, POSTERIOR APPROACH N/A 7/29/2022    Procedure: FUSION,SPINE,POSTERIOR APPROACH, T9-L3;  Surgeon: Luis E Lawson MD;  Location: [facility] OR;  Service: Orthopedics;  Laterality: N/A;    ROTATOR CUFF REPAIR Right     TONSILLECTOMY      VERTEBRAL CORPECTOMY N/A 7/27/2022    Procedure: CORPECTOMY T12;  Surgeon: Luis E  MD Lulu;  Location: [facility] OR;  Service: Orthopedics;  Laterality: N/A;     Family History   Problem Relation Age of Onset    COPD Mother     Diabetes Father     Cancer Brother      Social History     Tobacco Use    Smoking status: Every Day     Packs/day: 1.00     Years: 40.00     Pack years: 40.00     Types: Cigarettes    Smokeless tobacco: Never   Substance Use Topics    Alcohol use: Not Currently    Drug use: Not Currently       Review of patient's allergies indicates:   Allergen Reactions    Onion     Pork/porcine containing products     Shellfish containing products     Shrimp        Outpatient meds:  No current facility-administered medications on file prior to encounter.     Current Outpatient Medications on File Prior to Encounter   Medication Sig Dispense Refill    albuterol-ipratropium (DUO-NEB) 2.5 mg-0.5 mg/3 mL nebulizer solution Take 3 mLs by nebulization every 6 (six) hours while awake. Rescue 270 mL 0    aspirin 81 MG Chew Take 1 tablet (81 mg total) by mouth once daily. 30 tablet 0    atorvastatin (LIPITOR) 40 MG tablet Take 1 tablet (40 mg total) by mouth once daily. 30 tablet 0    clonazePAM (KLONOPIN) 0.5 MG tablet Take 0.5 mg by mouth 2 (two) times daily as needed for Anxiety.      doxazosin (CARDURA) 2 MG tablet Take 1 tablet (2 mg total) by mouth once daily. 30 tablet 0    fluticasone furoate-vilanteroL (BREO) 100-25 mcg/dose diskus inhaler Inhale 1 puff into the lungs once daily. Controller 30 each 0    fluvoxaMINE (LUVOX) 25 MG tablet Take 1 tablet (25 mg total) by mouth every evening. 90 tablet 1    gabapentin (NEURONTIN) 300 MG capsule Take 1 capsule by mouth once daily.      insulin aspart U-100 (NOVOLOG) 100 unit/mL (3 mL) InPn pen Inject 1-10 Units into the skin before meals and at bedtime as needed (Hyperglycemia).  0    insulin detemir U-100 (LEVEMIR FLEXTOUCH U-100 INSULN) 100 unit/mL (3 mL) InPn pen Inject 10 Units into the skin every evening.  0    levothyroxine (SYNTHROID)  50 MCG tablet Take 1 tablet (50 mcg total) by mouth once daily.      metoprolol succinate (TOPROL-XL) 25 MG 24 hr tablet Take 1 tablet (25 mg total) by mouth once daily. 30 tablet 0    NIFEdipine (PROCARDIA-XL) 90 MG (OSM) 24 hr tablet Take 1 tablet (90 mg total) by mouth once daily. 30 tablet 0    pantoprazole (PROTONIX) 40 MG tablet Take 1 tablet (40 mg total) by mouth once daily. 30 tablet 0    QUEtiapine (SEROQUEL) 50 MG tablet Take 1 tablet (50 mg total) by mouth 2 (two) times daily. 60 tablet 11    tamsulosin (FLOMAX) 0.4 mg Cap Take 1 capsule (0.4 mg total) by mouth once daily.      tiotropium bromide (SPIRIVA RESPIMAT) 2.5 mcg/actuation inhaler Inhale 2 puffs into the lungs once daily. Controller 4 g 0    [DISCONTINUED] albuterol (PROVENTIL) 2.5 mg /3 mL (0.083 %) nebulizer solution USE 1 VIAL VIA NEBULIZER EVERY 6 HOURS AS NEEDED 7/9/21      [DISCONTINUED] amLODIPine (NORVASC) 10 MG tablet Take 1 tablet (10 mg total) by mouth once daily. 30 tablet 11    [DISCONTINUED] furosemide (LASIX) 80 MG tablet Take 1 tablet (80 mg total) by mouth 2 (two) times daily. 60 tablet 0    [DISCONTINUED] isosorbide-hydrALAZINE 20-37.5 mg (BIDIL) 20-37.5 mg Tab Take 1 tablet by mouth 2 (two) times daily. 60 tablet 0    [DISCONTINUED] metoprolol tartrate (LOPRESSOR) 50 MG tablet Take 1 tablet (50 mg total) by mouth 2 (two) times daily. 60 tablet 11    [DISCONTINUED] mirtazapine (REMERON) 30 MG tablet Take 30 mg by mouth every evening.      [DISCONTINUED] omeprazole (PRILOSEC) 40 MG capsule Take 1 capsule (40 mg total) by mouth once daily. 30 capsule 11    [DISCONTINUED] pioglitazone (ACTOS) 30 MG tablet Take 30 mg by mouth once daily.      [DISCONTINUED] rivastigmine (EXELON) 4.6 mg/24 hour PT24 Place 1 patch onto the skin once daily. 30 patch 11    [DISCONTINUED] SITagliptin (JANUVIA) 100 MG Tab Take 100 mg by mouth once daily.      [DISCONTINUED] traZODone (DESYREL) 50 MG tablet Take 50 mg by mouth every evening.          Scheduled meds:   albuterol-ipratropium  3 mL Nebulization ED 1 Time    calcium gluconate IVPB  1 g Intravenous ED 1 Time    sodium bicarbonate  50 mEq Intravenous ED 1 Time    sodium zirconium cyclosilicate  5 g Oral ED 1 Time       Infusions:      PRN meds:      Review of Systems:  Constitutional:  Negative for chills, fever, malaise/fatigue and weight loss.   HENT:  Negative for hearing loss and nosebleeds.    Eyes:  Negative for blurred vision, double vision and photophobia.   Respiratory:  Negative for cough, shortness of breath and wheezing.    Cardiovascular:  Negative for chest pain, palpitations and leg swelling.   Gastrointestinal:  Negative for abdominal pain, constipation, diarrhea, heartburn, nausea and vomiting.   Genitourinary:  Negative for dysuria, frequency and urgency.   Musculoskeletal:  Negative for falls, joint pain and myalgias.   Skin:  Negative for itching and rash.   Neurological:  Negative for dizziness, speech change, focal weakness, loss of consciousness and headaches.   Endo/Heme/Allergies:  Does not bruise/bleed easily.   Psychiatric/Behavioral:  Negative for depression and substance abuse. The patient is not nervous/anxious.      OBJECTIVE:     Vital Signs and IO:  Temp:  [98.8 °F (37.1 °C)]   Pulse:  [66-73]   Resp:  [18]   BP: (125)/(58)   SpO2:  [88 %-92 %]   No intake/output data recorded.  Wt Readings from Last 5 Encounters:   01/16/23 90.7 kg (200 lb)   01/10/23 89.4 kg (197 lb)   09/14/22 89.5 kg (197 lb 4.8 oz)   08/25/22 91.2 kg (201 lb)   08/25/22 91.2 kg (201 lb)     Body mass index is 29.53 kg/m².    Physical Exam  Constitutional:       General: She is not in acute distress.     Appearance: She is well-developed. She is not diaphoretic.   HENT:      Head: Normocephalic and atraumatic.      Mouth/Throat:      Mouth: Mucous membranes are moist.   Eyes:      General: No scleral icterus.     Pupils: Pupils are equal, round, and reactive to light.   Cardiovascular:       Rate and Rhythm: Normal rate and regular rhythm.   Pulmonary:      Effort: Pulmonary effort is normal. No respiratory distress.      Breath sounds: No stridor.   Abdominal:      General: There is no distension.      Palpations: Abdomen is soft.   Musculoskeletal:         General: No deformity. Normal range of motion.      Cervical back: Neck supple.   Skin:     General: Skin is warm and dry.      Findings: No rash present. No erythema.   Neurological:      Mental Status: She is alert and oriented to person, place, and time.      Cranial Nerves: No cranial nerve deficit.   Psychiatric:         Behavior: Behavior normal.     Laboratory:  Recent Labs   Lab 01/10/23  0849 01/16/23  1015    132*   K 4.4 5.7*    101   CO2 20* 22*   BUN 35* 61*   CREATININE 2.1* 3.2*    126*       Recent Labs   Lab 01/10/23  0849 01/16/23  1015   CALCIUM 10.0 8.4*   ALBUMIN 3.8 3.2*       Recent Labs   Lab 07/10/21  1030 07/11/21  0625   PTH, Intact 92.2 H 129.7 H       No results for input(s): POCTGLUCOSE in the last 168 hours.    Recent Labs   Lab 04/04/22  2143 07/26/22  0631 09/14/22  1526   Hemoglobin A1C 5.6 6.1 H 6.1 H       Recent Labs   Lab 01/10/23  0849 01/16/23  1015   WBC 6.21 8.03   HGB 13.0* 10.4*   HCT 43.4 34.0*    166   MCV 78* 79*   MCHC 30.0* 30.6*   MONO 7.4  0.5 9.7  0.8       Recent Labs   Lab 01/10/23  0849 01/16/23  1015   BILITOT 0.6 0.7   PROT 7.9 6.5   ALBUMIN 3.8 3.2*   ALKPHOS 85 63   ALT 41 22   AST 37 21       Recent Labs   Lab 04/04/22  2155 07/25/22  1726 09/14/22  1539 01/10/23  1035   Color, UA Yellow Yellow Yellow Yellow   Appearance, UA Hazy A Clear Cloudy A Clear   pH, UA 5.5 6.0 5.0 6.0   Specific [place], UA 1.025 1.020 1.020 >=1.030 A   Protein, UA 3+ A 3+ A 2+ A 3+ A   Glucose, UA Trace A 1+ A Negative Negative   Ketones, UA Negative Negative Negative Negative   Urobilinogen, UA 2.0-3.0 A Negative 1.0  --    Bilirubin (UA) 1+ A Negative Negative 1+ A   Occult Blood UA  2+ A Trace A Negative Trace A   Nitrite, UA Negative Negative Negative Negative   RBC, UA 2 0 4 1   WBC, UA 4 4 3 6 H   Bacteria Rare Rare Negative None   Hyaline Casts, UA 11 A 13 A 3.5 A 1       Recent Labs   Lab 04/06/22  0953 09/14/22  1550 01/16/23  1047   POC PH 7.365 7.409 7.323 L   POC PCO2 39.9 25.8 L 38.9   POC HCO3 22.3 L 18.5 20.2 L   POC PO2 60.9 L 58.8 LL 60 L   POC SATURATED O2  --  93.9 89 L   POC BE  --   --  -6   Sample  --   --  ARTERIAL       Microbiology Results (last 7 days)       ** No results found for the last 168 hours. **            ASSESSMENT/PLAN:     BREA  Hyperkalemia  Acidosis  Hyponatremia  CKD stage 3, baseline sCr around 2  BPH  No NSAIDs, ACEI/ARB, IV contrast or other nephrotoxins.  Keep MAP > 60, SBP > 100.  Dose meds for GFR < 30 ml/min.  Renal diet - low K, low phos.  Add Lokelma.  Obtain renal US.  Agree with diuretics.  Repeat labs later today.    Anemia of CKD  Hgb and HCT are acceptable. Monitor for now.  Will provide JUANITA and/or IV iron PRN.    HTN  BP seem controlled.   Tolerate asymptomatic HTN up to -160.  Continue home meds.  Low sodium diet.    Thank you for allowing us to participate in the care of your patient!   We will follow the patient and provide recommendations as needed.    Patient care time was spent personally by me on the following activities:     Obtaining a history.  Examination of patient.  Providing medical care at the patients bedside.  Developing a treatment plan with patient or surrogate and bedside caregivers.  Ordering and reviewing laboratory studies, radiographic studies, pulse oximetry.  Ordering and performing treatments and interventions.  Evaluation of patient's response to treatment.  Discussions with consultants while on the unit and immediately available to the patient.  Re-evaluation of the patient's condition.  Documentation in the medical record.     Dave Harmon MD    [facility]  [address] [place] [address]  TAMERA Martinez  [id number]    [phone] - tel  [phone] - fax    1/16/2023

## 2023-01-16 NOTE — PROGRESS NOTES
Automatic Inhaler to Nebulizer Interchange    Tiotropium (Spiriva Respimat) 2.5 mcg changed to Ipratropium 0.5 mg every 6 hoursper [facility] Automatic Therapeutic Substitutions Protocol.    Please contact pharmacy at extension [phone] with any questions.     Thank you,   Daly Meade

## 2023-01-16 NOTE — HPI
Pam Gray is a 62 y.o. male with a history as  has a past medical history of Anxiety, Arthritis, CHF (congestive heart failure), Chronic kidney disease, COPD (chronic obstructive pulmonary disease), Coronary artery disease, Diabetes mellitus, Erectile dysfunction, Hypertension, Necrotizing fasciitis of forearm, Peripheral neuropathy, Stroke, and Thyroid disease. who presented to the ED with a Shortness of Breath (Pt coming from [facility] complaining of SOB. Pt was 79% on room air. Pt states that he is suppose to be on oxygen at all times. Received duo nebulizer en route to ed.)    Patient seen today in the ED observe on BiPAP on a 40% of oxygen able to carry on conversation report loss wife 2 months ago. He was recently admitted  to [facility] approximately a week ago and states he was without his oxygen, however endorses oxygen dependent (on home oxygen at 2 L per nasal cannula) for COPD. Report low energy.  He also states he did feel short of breath but was told his oxygen levels were low today.  Patient also reports smoking some type of synthetic (illegal drug). UDS +amphetamines.    Denies fever, chills, diaphoresis, dizziness, HA, chest pain, palpitations, NVD, recent trauma or any other associated symptoms.  Does smoke cigarettes, drink or do illegal drugs.     Lab and imaging obtained and reviewed CBC showed WBC 8.0, H/H 10.4/34.0, Cl 1 MCH 24.1,RDW 16.1 .  Chemistry profile shows sodium 132,  potassium 5.7, BUN 61, creatinine 3.2, eGFR 21.1 . BNP 1,225 with Troponin High sensitivity 73.7 EKG shows NSR on admit . 98.8 respiratory 18, b/p 125/58 SpO2 88%    CXR  IMPRESSION  Right lung base airspace opacities, potentially atelectasis, or pneumonia in the appropriate clinical setting.     Echo 7/25/22  1. The study quality is average.   2. Global left ventricular systolic function is normal. The left   ventricular ejection fraction is 50-55%.   3. Severe aortic valve  stenosis is present. Aortic valve area   continuity equation is 0.9 cm?.     4. Moderate calcification of the aortic valve is noted.  Moderate   mitral annular calcification is noted.     5. The left atrium is mildly enlarged. Left atrial diameter is 4.6   cms.     6. Moderate (2+) aortic regurgitation. Mild (1+) mitral regurgitation.   Trace tricuspid regurgitation.     7. The pulmonary artery appears normal.     Per ED provider patient presented to the ED with a complaint of shortness of breath history of CKD CHF COPD with O2 at home. patient recently have a suicide ideation was admitted in the [facility] for 5 to 7days w/o oxygen . In the ER Spo2 in the 70's on room air was put on 5 L oxygen trend up , currently BiPAP. Elevated BUN/CR 61/ 33 with baseline Cr 2. acute on chronic kidney disease nephrology was consulted.

## 2023-01-16 NOTE — ED PROVIDER NOTES
Encounter Date: 1/16/2023       History     Chief Complaint   Patient presents with    Shortness of Breath     Pt coming from [facility] complaining of SOB. Pt was 79% on room air. Pt states that he is suppose to be on oxygen at all times. Received duo nebulizer en route to ed.     Patient presents complaining of shortness of breath.  Patient comes from [facility] under [facility] for suicidal ideation.  Patient has history of chronic kidney disease, CHF, COPD.  Patient is on home oxygen although he has not been on oxygen at [facility].  Patient having worsening shortness of breath today.    Review of patient's allergies indicates:   Allergen Reactions    Onion Other (See Comments)     THROAT SWELLS    Pork/porcine containing products      Lutheran PREFERENCE    Shellfish containing products      Lutheran PREFERENCE      Shrimp      Lutheran PREFERENCE       Past Medical History:   Diagnosis Date    Anxiety     Arthritis     CHF (congestive heart failure)     Chronic kidney disease     COPD (chronic obstructive pulmonary disease)     Coronary artery disease     Diabetes mellitus     Erectile dysfunction     Hypertension     Necrotizing fasciitis of forearm     Peripheral neuropathy     Stroke     TIA    Thyroid disease      Past Surgical History:   Procedure Laterality Date    arm surgery Right     arthroscopy Right     knee    COLONOSCOPY N/A 7/3/2019    Procedure: COLONOSCOPY;  Surgeon: Jagdish Pollock MD;  Location: [facility] ENDO;  Service: Endoscopy;  Laterality: N/A;    ESOPHAGOGASTRODUODENOSCOPY N/A 7/3/2019    Procedure: ESOPHAGOGASTRODUODENOSCOPY (EGD);  Surgeon: Jagdish Pollock MD;  Location: [facility] ENDO;  Service: Endoscopy;  Laterality: N/A;    EYE SURGERY      FUSION, SPINE, POSTERIOR APPROACH N/A 7/29/2022    Procedure: FUSION,SPINE,POSTERIOR APPROACH, T9-L3;  Surgeon: Luis E Lawson MD;  Location: [facility] OR;  Service: Orthopedics;  Laterality: N/A;    ROTATOR CUFF REPAIR Right      TONSILLECTOMY      VERTEBRAL CORPECTOMY N/A 7/27/2022    Procedure: CORPECTOMY T12;  Surgeon: Luis E Lawson MD;  Location[facility] OR;  Service: Orthopedics;  Laterality: N/A;     Family History   Problem Relation Age of Onset    COPD Mother     Diabetes Father     Cancer Brother      Social History     Tobacco Use    Smoking status: Every Day     Packs/day: 1.00     Years: 40.00     Pack years: 40.00     Types: Cigarettes    Smokeless tobacco: Never   Substance Use Topics    Alcohol use: Not Currently    Drug use: Not Currently     Review of Systems   All other systems reviewed and are negative.    Physical Exam     Initial Vitals [01/16/23 1011]   BP Pulse Resp Temp SpO2   (!) 125/58 73 18 98.8 °F (37.1 °C) (!) 88 %      MAP       --         Physical Exam    Nursing note and vitals reviewed.  Constitutional: He appears well-developed and well-nourished. He is not diaphoretic. No distress.   Pleasant, polite.   HENT:   Head: Normocephalic and atraumatic.   Mouth/Throat: Oropharynx is clear and moist.   Eyes: EOM are normal.   Neck: Neck supple.   Normal range of motion.  Cardiovascular:  Normal rate, regular rhythm, normal heart sounds and intact distal pulses.           Pulmonary/Chest: Breath sounds normal. No respiratory distress.   Abdominal: Abdomen is soft.   Musculoskeletal:         General: Normal range of motion.      Cervical back: Normal range of motion and neck supple.     Neurological: He is alert and oriented to person, place, and time. He has normal strength.   Skin: Skin is warm and dry.   Psychiatric: He has a normal mood and affect. His behavior is normal. Judgment and thought content normal.       ED Course   Procedures  Labs Reviewed   CBC W/ AUTO DIFFERENTIAL - Abnormal; Notable for the following components:       Result Value    RBC 4.31 (*)     Hemoglobin 10.4 (*)     Hematocrit 34.0 (*)     MCV 79 (*)     MCH 24.1 (*)     MCHC 30.6 (*)     RDW 16.1 (*)     Immature Granulocytes 0.6 (*)      Immature Grans (Abs) 0.05 (*)     Gran % 74.1 (*)     Lymph % 14.3 (*)     All other components within normal limits   COMPREHENSIVE METABOLIC PANEL - Abnormal; Notable for the following components:    Sodium 132 (*)     Potassium 5.7 (*)     CO2 22 (*)     Glucose 126 (*)     BUN 61 (*)     Creatinine 3.2 (*)     Calcium 8.4 (*)     Albumin 3.2 (*)     eGFR 21.1 (*)     All other components within normal limits   TROPONIN I HIGH SENSITIVITY - Abnormal; Notable for the following components:    Troponin I High Sensitivity 73.7 (*)     All other components within normal limits    Narrative:     Trop critical result(s) repeated. Called and verbal readback obtained   from Gina Dumont RN/ed by [facility] 01/16/2023 12:07   B-TYPE NATRIURETIC PEPTIDE - Abnormal; Notable for the following components:    BNP 1,225 (*)     All other components within normal limits   TROPONIN I HIGH SENSITIVITY - Abnormal; Notable for the following components:    Troponin I High Sensitivity 112.1 (*)     All other components within normal limits    Narrative:     Trop critical result(s) repeated. Called and verbal readback obtained   from Liane Pena RN/ed by [facility] 01/16/2023 14:18   URINALYSIS - Abnormal; Notable for the following components:    Protein, UA 1+ (*)     Leukocytes, UA 1+ (*)     All other components within normal limits    Narrative:     Collection Type->Urine, Clean Catch   TROPONIN I HIGH SENSITIVITY - Abnormal; Notable for the following components:    Troponin I High Sensitivity 206.1 (*)     All other components within normal limits    Narrative:     STAT, if not done in ED, then at 2nd and 6th hour from  initial draw.     Troponin critical result(s) called and verbal readback obtained   from Surinder Maharaj RN ER by [facility] 01/17/2023 05:49   B-TYPE NATRIURETIC PEPTIDE - Abnormal; Notable for the following components:    BNP 1,118 (*)     All other components within normal limits   TROPONIN I HIGH SENSITIVITY - Abnormal; Notable for the  following components:    Troponin I High Sensitivity 143.1 (*)     All other components within normal limits    Narrative:     In order to access the algorithm for troponin high  sensitivity orders access the address below:  [email or web address]  Trop critical result(s) repeated. Called and verbal readback obtained   from Blanca De La O RN/ed by [facility] 01/16/2023 17:00   URINALYSIS MICROSCOPIC - Abnormal; Notable for the following components:    WBC, UA 14 (*)     Hyaline Casts, UA 5 (*)     All other components within normal limits    Narrative:     Collection Type->Urine, Clean Catch   RENAL FUNCTION PANEL - Abnormal; Notable for the following components:    Glucose 115 (*)     Sodium 132 (*)     Potassium 5.7 (*)     CO2 21 (*)     BUN 65 (*)     Calcium 8.4 (*)     Creatinine 3.2 (*)     Albumin 3.2 (*)     Phosphorus 4.6 (*)     eGFR 21.1 (*)     All other components within normal limits   DRUG SCREEN PANEL, URINE EMERGENCY - Abnormal; Notable for the following components:    Amphetamine Screen, Ur Presumptive Positive (*)     All other components within normal limits   TROPONIN I HIGH SENSITIVITY - Abnormal; Notable for the following components:    Troponin I High Sensitivity 206.1 (*)     All other components within normal limits    Narrative:     STAT, if not done in ED, then at 2nd and 6th hour from  initial draw.     Troponin critical result(s) called and verbal readback obtained   from Surinder Maharaj RN ER by [facility] 01/17/2023 05:49   HEMOGLOBIN A1C - Abnormal; Notable for the following components:    Hemoglobin A1C 6.6 (*)     Estimated Avg Glucose 143 (*)     All other components within normal limits   CBC W/ AUTO DIFFERENTIAL - Abnormal; Notable for the following components:    RBC 4.08 (*)     Hemoglobin 9.7 (*)     Hematocrit 31.9 (*)     MCV 78 (*)     MCH 23.8 (*)     MCHC 30.4 (*)     RDW 16.2 (*)     Platelets 127 (*)     Lymph # 0.8 (*)     Gran % 76.9  (*)     Lymph % 11.6 (*)     All other components within normal limits   BASIC METABOLIC PANEL - Abnormal; Notable for the following components:    Glucose 135 (*)     BUN 62 (*)     Creatinine 3.1 (*)     eGFR 21.9 (*)     All other components within normal limits   BASIC METABOLIC PANEL - Abnormal; Notable for the following components:    BUN 67 (*)     Creatinine 3.2 (*)     Calcium 8.4 (*)     eGFR 21.1 (*)     All other components within normal limits   COMPREHENSIVE METABOLIC PANEL - Abnormal; Notable for the following components:    CO2 22 (*)     BUN 68 (*)     Creatinine 3.2 (*)     Calcium 8.4 (*)     Albumin 2.9 (*)     Total Bilirubin 1.1 (*)     Alkaline Phosphatase 53 (*)     eGFR 21.1 (*)     All other components within normal limits   ISTAT PROCEDURE - Abnormal; Notable for the following components:    POC PH 7.323 (*)     POC PO2 60 (*)     POC HCO3 20.2 (*)     POC SATURATED O2 89 (*)     POC TCO2 21 (*)     All other components within normal limits   POCT GLUCOSE - Abnormal; Notable for the following components:    POC Glucose 129 (*)     All other components within normal limits   POCT GLUCOSE - Abnormal; Notable for the following components:    POC Glucose 150 (*)     All other components within normal limits   POCT GLUCOSE - Abnormal; Notable for the following components:    POC Glucose 186 (*)     All other components within normal limits   SARS-COV-2 RNA AMPLIFICATION, QUAL   INFLUENZA A AND B ANTIGEN    Narrative:     Specimen Source->Nasopharyngeal Swab   RENAL FUNCTION PANEL   LACTIC ACID, PLASMA   MAGNESIUM   HEMOGLOBIN A1C   CK   CBC WITHOUT DIFFERENTIAL   CK   MAGNESIUM   DRUG SCREEN PANEL, URINE EMERGENCY   TROPONIN I HIGH SENSITIVITY   POCT GLUCOSE, HAND-HELD DEVICE   POCT GLUCOSE, HAND-HELD DEVICE   POCT GLUCOSE, HAND-HELD DEVICE   POCT GLUCOSE, HAND-HELD DEVICE   POCT GLUCOSE, HAND-HELD DEVICE        ECG Results              EKG 12-lead (In process)  Result time 01/16/23 15:57:10       In process by Interface, Lab In [facility] (01/16/23 15:57:10)                   Narrative:    Test Reason : R77.8,    Vent. Rate : 074 BPM     Atrial Rate : 074 BPM     P-R Int : 154 ms          QRS Dur : 112 ms      QT Int : 396 ms       P-R-T Axes : 056 -10 108 degrees     QTc Int : 439 ms    Sinus rhythm with Premature atrial complexes  Septal infarct (cited on or before 16-JAN-2023)  T wave abnormality, consider lateral ischemia  Abnormal ECG  When compared with ECG of 16-JAN-2023 10:25,  Premature atrial complexes are now Present  Serial changes of Septal infarct Present    Referred By: [email or web address]   SELF           Confirmed By:                                      EKG 12-lead (In process)  Result time 01/16/23 10:56:57      In process by Interface, Lab In [facility] (01/16/23 10:56:57)                   Narrative:    Test Reason : R07.9,    Vent. Rate : 070 BPM     Atrial Rate : 070 BPM     P-R Int : 160 ms          QRS Dur : 110 ms      QT Int : 420 ms       P-R-T Axes : 063 -08 096 degrees     QTc Int : 453 ms    Normal sinus rhythm  LVH with repolarization abnormality ( Aman product )  Cannot rule out Septal infarct (cited on or before 16-JAN-2023)  Abnormal ECG  When compared with ECG of 14-SEP-2022 15:33,  No significant change was found    Referred By: [email or web address]   SELF           Confirmed By:                                   Imaging Results              US Retroperitoneal Complete (Final result)  Result time 01/16/23 14:05:10      Final result by Chyu Patton IV, MD (01/16/23 14:05:10)                   Narrative:    Renal ultrasound    HISTORY: Abnormal renal function.    The right kidney measures 10.6 cm in sagittal dimension. The left kidney measures 9.7 cm. Left upper pole renal cortical cysts measure up to 3.2 cm. There are no solid masses or hydronephrosis is observed.    A trace amount of perinephric fluid is seen about the right kidney. The midline retroperitoneum is predominantly obscured.  Incidental observation is made of a small right-sided pleural effusion.    The urinary bladder is distended but otherwise demonstrates no abnormality.    IMPRESSION:    Left renal cysts.    Trace amount of right-sided perinephric free fluid    Distention of the urinary bladder.    Obscuration of the midline retroperitoneum.    Electronically signed by:  Chuy aPtton MD  1/16/2023 2:05 PM CST Workstation: [id number]                                     X-Ray Chest AP Portable (Final result)  Result time 01/16/23 12:03:42      Final result by Damon Chaves MD (01/16/23 12:03:42)                   Narrative:    HISTORY: Chest pain,  dyspnea and hypoxia.    FINDINGS: Portable chest radiograph at 1110 hours compared to prior exams including 09/14/2022 shows the cardiomediastinal silhouette and pulmonary vasculature are within normal limits. There are aortic vascular calcifications.    The lungs are normally expanded, with right lung base airspace opacities medially, nonspecific. There is no lobar consolidation, large pleural effusion, evidence of pulmonary edema, or pneumothorax. No acute fractures or destructive osseous lesions.    IMPRESSION: Right lung base airspace opacities, potentially atelectasis, or pneumonia in the appropriate clinical setting.    Electronically signed by:  Damon Chaves MD  1/16/2023 12:03 PM CST Workstation: [id number]                                     Medications   aspirin chewable tablet 81 mg (81 mg Oral Given 1/17/23 1000)   atorvastatin tablet 40 mg (40 mg Oral Given 1/17/23 1000)   clonazePAM tablet 0.5 mg (has no administration in time range)   doxazosin tablet 2 mg (2 mg Oral Given 1/17/23 1000)   fluvoxaMINE tablet 25 mg (25 mg Oral Not Given 1/17/23 2100)   levothyroxine tablet 50 mcg (50 mcg Oral Given 1/17/23 1000)   metoprolol succinate (TOPROL-XL) 24 hr tablet 25 mg (25 mg Oral Given 1/17/23 1000)   NIFEdipine 24 hr tablet 90 mg (90 mg Oral Given 1/17/23 1000)   pantoprazole EC  tablet 40 mg (40 mg Oral Given 1/17/23 1000)   QUEtiapine tablet 50 mg (50 mg Oral Given 1/17/23 2008)   tamsulosin 24 hr capsule 0.4 mg (0.4 mg Oral Given 1/17/23 1000)   sodium chloride 0.9% flush 10 mL (has no administration in time range)   naloxone 0.4 mg/mL injection 0.02 mg (has no administration in time range)   glucose chewable tablet 16 g (has no administration in time range)   glucose chewable tablet 24 g (has no administration in time range)   glucagon (human recombinant) injection 1 mg (has no administration in time range)   dextrose 10% bolus 125 mL 125 mL (has no administration in time range)   dextrose 10% bolus 250 mL 250 mL (has no administration in time range)   polyethylene glycol packet 17 g (17 g Oral Given 1/17/23 1000)   insulin aspart U-100 pen 0-5 Units (has no administration in time range)   insulin detemir U-100 pen 5 Units (5 Units Subcutaneous Given 1/17/23 2100)   furosemide injection 40 mg (40 mg Intravenous Given 1/17/23 1759)   ipratropium 0.02 % nebulizer solution 0.5 mg (has no administration in time range)   calcium gluconate 1 g in NS IVPB (premixed) (0 g Intravenous Stopped 1/16/23 1529)   sodium bicarbonate 8.4 % (1 mEq/mL) injection 50 mEq (50 mEq Intravenous Given 1/16/23 1323)   sodium zirconium cyclosilicate packet 5 g (5 g Oral Given 1/16/23 1328)   albuterol-ipratropium 2.5 mg-0.5 mg/3 mL nebulizer solution 3 mL (3 mLs Nebulization Given 1/16/23 1336)   furosemide injection 40 mg (40 mg Intravenous Given 1/16/23 1327)   furosemide injection 60 mg (60 mg Intravenous Given 1/16/23 1939)     Medical Decision Making:   Initial Assessment:   Patient in no apparent distress  Differential Diagnosis:   Considerations include but are not limited to congestive heart failure, pneumonia, COPD exacerbation, electrolyte abnormalities  Clinical Tests:   Lab Tests: Reviewed and Ordered  Radiological Study: Ordered and Reviewed  Medical Tests: Reviewed and Ordered  ED  Management:  Patient's arterial blood gas shows PO2 of 60 on 5 L.  Patient BNP is elevated greater than 1200.  Patient has acute on chronic kidney failure with potassium 5.7 creatinine greater than 3.  In the emergency department patient received potassium shifting medication including calcium IV, sodium bicarb IV.  Patient be consulted to Hospital Medicine for congestive heart failure and kidney failure.  Patient remains stable.    Attending Critical Care:   Critical Care Times:   Direct Patient Care (initial evaluation, reassessments, and time considering the case)..............................................................5 minutes.   Additional History from reviewing old medical records or taking additional history from the family, EMS, PCP, etc......................5 minutes.   Ordering, Reviewing, and Interpreting Diagnostic Studies..............................................................................................................5 minutes.   Documentation..................................................................................................................................................................................5 minutes.   ==============================================================  · Total Critical Care Time - exclusive of procedural time: 20 minutes.  ==============================================================  Critical care was necessary to treat or prevent imminent or life-threatening deterioration of the following conditions:  Hyperkalemia.   Critical care was time spent personally by me on the following activities: obtaining history from patient or relative, examination of patient, review of x-rays / CT sent with the patient, ordering lab, x-rays, and/or EKG, development of treatment plan with patient or relative, ordering and performing treatments and interventions, interpretation of cardiac measurements and re-evaluation of patient's condition.   Critical Care  Condition: potentially life-threatening              ED Course as of 01/18/23 0005   Mon Jan 16, 2023   1204 POC PH(!): 7.323 [AP]   1204 POC PO2(!): 60 [AP]   1204 BNP(!): 1,225 [AP]   1204 Potassium(!): 5.7 [AP]   1204 CO2(!): 22 [AP]   1204 Glucose(!): 126 [AP]   1204 BUN(!): 61 [AP]   1204 Creatinine(!): 3.2 [AP]   1204 Calcium(!): 8.4 [AP]   1208 Troponin I High Sensitivity(!!): 73.7 [AP]      ED Course User Index  [AP] Alpesh Feng MD                 Clinical Impression:   Final diagnoses:  [R07.9] Chest pain  [I50.9] Congestive heart failure, unspecified HF chronicity, unspecified heart failure type (Primary)  [R09.02] Hypoxia  [N17.9] BREA (acute kidney injury)        ED Disposition Condition    Observation                 Alpesh Feng MD  01/16/23 1207       Alpesh Feng MD  01/18/23 0006

## 2023-01-16 NOTE — CARE UPDATE
01/16/23 1106   Patient Assessment/Suction   Level of Consciousness (AVPU) alert   Respiratory Effort Unlabored;Normal   Expansion/Accessory Muscles/Retractions expansion symmetric   All Lung Fields Breath Sounds clear;diminished   Rhythm/Pattern, Respiratory unlabored   PRE-TX-O2   Device (Oxygen Therapy) BIPAP   Oxygen Concentration (%) 40   SpO2 (!) 92 %   Pulse 66   Ready to Wean/Extubation Screen   FIO2<=50 (chart decimal) 0.4   Preset CPAP/BiPAP Settings   Mode Of Delivery BiPAP   $ CPAP/BiPAP Daily Charge BiPAP/CPAP Daily   $ Initial CPAP/BiPAP Setup? Yes   $ Is patient using? Yes   Size of Mask Medium/Large   Sized Appropriately? Yes   Equipment Type V60   Airway Device Type large full face mask   Humidifier not applicable   Ipap 10   EPAP (cm H2O) 5   Pressure Support (cm H2O) 5   Set Rate (Breaths/Min) 12   ITime (sec) 0.9   Rise Time (sec) 3   Patient CPAP/BiPAP Settings   RR Total (Breaths/Min) 28   Tidal Volume (mL) 793   VE Minute Ventilation (L/min) 22.5 L/min   Peak Inspiratory Pressure (cm H2O) 11   TiTOT (%) 32   Total Leak (L/Min) 43   Patient Trigger - ST Mode Only (%) 89   Respiratory Evaluation   $ Care Plan Tech Time 15 min

## 2023-01-16 NOTE — H&P
[facility] Emergency Dept  Hospital Medicine  History & Physical    Patient Name: Pam Gray  MRN: [id number]  Patient Class: OP- Observation  Admission Date: 1/16/2023  Attending Physician: Jeremy Campa MD   Primary Care Provider: Jacob Zuniga MD         Patient information was obtained from patient and ER records.     Subjective:     Principal Problem:Acute on chronic combined systolic and diastolic CHF, NYHA class 1    Chief Complaint:   Chief Complaint   Patient presents with    Shortness of Breath     Pt coming from [facility] complaining of SOB. Pt was 79% on room air. Pt states that he is suppose to be on oxygen at all times. Received duo nebulizer en route to ed.        HPI: Pam Gray is a 62 y.o. male with a history as  has a past medical history of Anxiety, Arthritis, CHF (congestive heart failure), Chronic kidney disease, COPD (chronic obstructive pulmonary disease), Coronary artery disease, Diabetes mellitus, Erectile dysfunction, Hypertension, Necrotizing fasciitis of forearm, Peripheral neuropathy, Stroke, and Thyroid disease. who presented to the ED with a Shortness of Breath (Pt coming from [facility] complaining of SOB. Pt was 79% on room air. Pt states that he is suppose to be on oxygen at all times. Received duo nebulizer en route to ed.)    Patient seen today in the ED observe on BiPAP on a 40% of oxygen able to carry on conversation report loss wife 2 months ago. He was recently admitted  to [facility] approximately a week ago and states he was without his oxygen, however endorses oxygen dependent (on home oxygen at 2 L per nasal cannula) for COPD. Report low energy.  He also states he did feel short of breath but was told his oxygen levels were low today.  Patient also reports smoking some type of synthetic (illegal drug).     Denies fever, chills, diaphoresis, dizziness, HA, chest pain, palpitations, NVD, recent  trauma or any other associated symptoms.  Does smoke cigarettes, drink or do illegal drugs.     Lab and imaging obtained and reviewed CBC showed WBC 8.0, H/H 10.4/34.0, Cl 1 MCH 24.1,RDW 16.1 .  Chemistry profile shows sodium 132,  potassium 5.7, BUN 61, creatinine 3.2, eGFR 21.1 . BNP 1,225 with Troponin High sensitivity 73.7 EKG shows NSR on admit . 98.8 respiratory 18, b/p 125/58 SpO2 88%    CXR  IMPRESSION  Right lung base airspace opacities, potentially atelectasis, or pneumonia in the appropriate clinical setting.     Echo 7/25/22  1. The study quality is average.   2. Global left ventricular systolic function is normal. The left   ventricular ejection fraction is 50-55%.   3. Severe aortic valve stenosis is present. Aortic valve area   continuity equation is 0.9 cm?.     4. Moderate calcification of the aortic valve is noted.  Moderate   mitral annular calcification is noted.     5. The left atrium is mildly enlarged. Left atrial diameter is 4.6   cms.     6. Moderate (2+) aortic regurgitation. Mild (1+) mitral regurgitation.   Trace tricuspid regurgitation.     7. The pulmonary artery appears normal.     Per ED provider patient presented to the ED with a complaint of shortness of breath history of CKD CHF COPD with O2 at home. patient recently have a suicide ideation was admitted in the [facility] for 5 to 7days w/o oxygen . In the ER Spo2 in the 70's on room air was put on 5 L oxygen trend up , currently BiPAP. Elevated BUN/CR 61/ 33 with baseline Cr 2. acute on chronic kidney disease nephrology was consulted.                     Past Medical History:   Diagnosis Date    Anxiety     Arthritis     CHF (congestive heart failure)     Chronic kidney disease     COPD (chronic obstructive pulmonary disease)     Coronary artery disease     Diabetes mellitus     Erectile dysfunction     Hypertension     Necrotizing fasciitis of forearm     Peripheral neuropathy     Stroke     TIA    Thyroid  disease        Past Surgical History:   Procedure Laterality Date    arm surgery Right     arthroscopy Right     knee    COLONOSCOPY N/A 7/3/2019    Procedure: COLONOSCOPY;  Surgeon: Jagdish Pollock MD;  Location: [facility] ENDO;  Service: Endoscopy;  Laterality: N/A;    ESOPHAGOGASTRODUODENOSCOPY N/A 7/3/2019    Procedure: ESOPHAGOGASTRODUODENOSCOPY (EGD);  Surgeon: Jagdish Pollock MD;  Location: [facility] ENDO;  Service: Endoscopy;  Laterality: N/A;    EYE SURGERY      FUSION, SPINE, POSTERIOR APPROACH N/A 7/29/2022    Procedure: FUSION,SPINE,POSTERIOR APPROACH, T9-L3;  Surgeon: Luis E Lawson MD;  Location: [facility] OR;  Service: Orthopedics;  Laterality: N/A;    ROTATOR CUFF REPAIR Right     TONSILLECTOMY      VERTEBRAL CORPECTOMY N/A 7/27/2022    Procedure: CORPECTOMY T12;  Surgeon: Lui sE Lawson MD;  Location: [facility] OR;  Service: Orthopedics;  Laterality: N/A;       Review of patient's allergies indicates:   Allergen Reactions    Onion     Pork/porcine containing products     Shellfish containing products     Shrimp        No current facility-administered medications on file prior to encounter.     Current Outpatient Medications on File Prior to Encounter   Medication Sig    albuterol-ipratropium (DUO-NEB) 2.5 mg-0.5 mg/3 mL nebulizer solution Take 3 mLs by nebulization every 6 (six) hours while awake. Rescue    aspirin 81 MG Chew Take 1 tablet (81 mg total) by mouth once daily.    atorvastatin (LIPITOR) 40 MG tablet Take 1 tablet (40 mg total) by mouth once daily.    clonazePAM (KLONOPIN) 0.5 MG tablet Take 0.5 mg by mouth 2 (two) times daily as needed for Anxiety.    doxazosin (CARDURA) 2 MG tablet Take 1 tablet (2 mg total) by mouth once daily.    fluticasone furoate-vilanteroL (BREO) 100-25 mcg/dose diskus inhaler Inhale 1 puff into the lungs once daily. Controller    fluvoxaMINE (LUVOX) 25 MG tablet Take 1 tablet (25 mg total) by mouth every evening.    gabapentin (NEURONTIN) 300 MG capsule Take 1 capsule  by mouth once daily.    insulin aspart U-100 (NOVOLOG) 100 unit/mL (3 mL) InPn pen Inject 1-10 Units into the skin before meals and at bedtime as needed (Hyperglycemia).    insulin detemir U-100 (LEVEMIR FLEXTOUCH U-100 INSULN) 100 unit/mL (3 mL) InPn pen Inject 10 Units into the skin every evening.    levothyroxine (SYNTHROID) 50 MCG tablet Take 1 tablet (50 mcg total) by mouth once daily.    metoprolol succinate (TOPROL-XL) 25 MG 24 hr tablet Take 1 tablet (25 mg total) by mouth once daily.    NIFEdipine (PROCARDIA-XL) 90 MG (OSM) 24 hr tablet Take 1 tablet (90 mg total) by mouth once daily.    pantoprazole (PROTONIX) 40 MG tablet Take 1 tablet (40 mg total) by mouth once daily.    QUEtiapine (SEROQUEL) 50 MG tablet Take 1 tablet (50 mg total) by mouth 2 (two) times daily.    tamsulosin (FLOMAX) 0.4 mg Cap Take 1 capsule (0.4 mg total) by mouth once daily.    tiotropium bromide (SPIRIVA RESPIMAT) 2.5 mcg/actuation inhaler Inhale 2 puffs into the lungs once daily. Controller    [DISCONTINUED] albuterol (PROVENTIL) 2.5 mg /3 mL (0.083 %) nebulizer solution USE 1 VIAL VIA NEBULIZER EVERY 6 HOURS AS NEEDED 7/9/21    [DISCONTINUED] amLODIPine (NORVASC) 10 MG tablet Take 1 tablet (10 mg total) by mouth once daily.    [DISCONTINUED] furosemide (LASIX) 80 MG tablet Take 1 tablet (80 mg total) by mouth 2 (two) times daily.    [DISCONTINUED] isosorbide-hydrALAZINE 20-37.5 mg (BIDIL) 20-37.5 mg Tab Take 1 tablet by mouth 2 (two) times daily.    [DISCONTINUED] metoprolol tartrate (LOPRESSOR) 50 MG tablet Take 1 tablet (50 mg total) by mouth 2 (two) times daily.    [DISCONTINUED] mirtazapine (REMERON) 30 MG tablet Take 30 mg by mouth every evening.    [DISCONTINUED] omeprazole (PRILOSEC) 40 MG capsule Take 1 capsule (40 mg total) by mouth once daily.    [DISCONTINUED] pioglitazone (ACTOS) 30 MG tablet Take 30 mg by mouth once daily.    [DISCONTINUED] rivastigmine (EXELON) 4.6 mg/24 hour PT24 Place 1 patch onto the skin once  daily.    [DISCONTINUED] SITagliptin (JANUVIA) 100 MG Tab Take 100 mg by mouth once daily.    [DISCONTINUED] traZODone (DESYREL) 50 MG tablet Take 50 mg by mouth every evening.     Family History       Problem Relation (Age of Onset)    COPD Mother    Cancer Brother    Diabetes Father          Tobacco Use    Smoking status: Every Day     Packs/day: 1.00     Years: 40.00     Pack years: 40.00     Types: Cigarettes    Smokeless tobacco: Never   Substance and Sexual Activity    Alcohol use: Not Currently    Drug use: Not Currently    Sexual activity: Not on file     Review of Systems   Constitutional:  Positive for fatigue. Negative for chills, diaphoresis and fever.   HENT:  Negative for congestion, postnasal drip, sinus pressure and sore throat.    Eyes:  Negative for visual disturbance.   Respiratory:  Positive for shortness of breath. Negative for cough, chest tightness and wheezing.    Cardiovascular:  Negative for chest pain, palpitations and leg swelling.   Gastrointestinal:  Negative for abdominal distention, abdominal pain, blood in stool, constipation, diarrhea, nausea and vomiting.   Endocrine: Negative.    Genitourinary:  Negative for dysuria.   Musculoskeletal: Negative.    Skin: Negative.    Allergic/Immunologic: Negative.    Neurological:  Negative for dizziness, weakness, numbness and headaches.   Hematological: Negative.    Psychiatric/Behavioral:  Negative for suicidal ideas.         Recently lost wife 2 months    Objective:     Vital Signs (Most Recent):  Temp: 98.8 °F (37.1 °C) (01/16/23 1011)  Pulse: 66 (01/16/23 1106)  Resp: 18 (01/16/23 1011)  BP: (!) 125/58 (01/16/23 1011)  SpO2: (!) 92 % (01/16/23 1106)   Vital Signs (24h Range):  Temp:  [98.8 °F (37.1 °C)] 98.8 °F (37.1 °C)  Pulse:  [66-73] 66  Resp:  [18] 18  SpO2:  [88 %-92 %] 92 %  BP: (125)/(58) 125/58     Weight: 90.7 kg (200 lb)  Body mass index is 29.53 kg/m².    Physical Exam  Constitutional:       General: He is not in acute  distress.     Appearance: Normal appearance. He is well-developed. He is not toxic-appearing or diaphoretic.      Comments: Bipap   HENT:      Head: Normocephalic and atraumatic.   Eyes:      General: Lids are normal.      Conjunctiva/sclera: Conjunctivae normal.      Pupils: Pupils are equal, round, and reactive to light.   Neck:      Thyroid: No thyroid mass or thyromegaly.      Vascular: Normal carotid pulses. No JVD.      Trachea: Trachea normal. No tracheal deviation.   Cardiovascular:      Rate and Rhythm: Normal rate and regular rhythm.      Pulses: Normal pulses.      Heart sounds: Normal heart sounds, S1 normal and S2 normal.   Pulmonary:      Effort: Pulmonary effort is normal.      Breath sounds: Normal breath sounds. Decreased air movement present. No stridor.   Abdominal:      General: Bowel sounds are normal.      Palpations: Abdomen is soft.      Tenderness: There is no abdominal tenderness.   Musculoskeletal:         General: Normal range of motion.      Cervical back: Full passive range of motion without pain, normal range of motion and neck supple.      Right lower le+ Pitting Edema present.      Left lower le+ Pitting Edema present.   Skin:     General: Skin is warm and dry.      Nails: There is no clubbing.   Neurological:      Mental Status: He is alert and oriented to person, place, and time.      Cranial Nerves: No cranial nerve deficit.      Sensory: No sensory deficit.   Psychiatric:         Speech: Speech normal.         Behavior: Behavior normal. Behavior is cooperative.         Thought Content: Thought content normal.         Judgment: Judgment normal.      Comments: Patient  very tearful from recent loss         CRANIAL NERVES     CN III, IV, VI   Pupils are equal, round, and reactive to light.     Significant Labs: All pertinent labs within the past 24 hours have been reviewed.  A1C:   Recent Labs   Lab 22  0631 22  1526   HGBA1C 6.1* 6.1*     ABGs:   Recent Labs    Lab 01/16/23  1047   PH 7.323*   PCO2 38.9   HCO3 20.2*   POCSATURATED 89*   BE -6   PO2 60*     Bilirubin:   Recent Labs   Lab 01/10/23  0849 01/16/23  1015   BILITOT 0.6 0.7     BMP:   Recent Labs   Lab 01/16/23  1015   *   *   K 5.7*      CO2 22*   BUN 61*   CREATININE 3.2*   CALCIUM 8.4*     CBC:   Recent Labs   Lab 01/16/23  1015   WBC 8.03   HGB 10.4*   HCT 34.0*        CMP:   Recent Labs   Lab 01/16/23  1015   *   K 5.7*      CO2 22*   *   BUN 61*   CREATININE 3.2*   CALCIUM 8.4*   PROT 6.5   ALBUMIN 3.2*   BILITOT 0.7   ALKPHOS 63   AST 21   ALT 22   ANIONGAP 9     Cardiac Markers:   Recent Labs   Lab 01/16/23  1015   BNP 1,225*       Troponin:   Recent Labs   Lab 01/16/23  1015   TROPONINIHS 73.7*     TSH:   Recent Labs   Lab 01/10/23  0849   TSH 2.703         Significant Imaging: I have reviewed all pertinent imaging results/findings within the past 24 hours.  X-Ray Chest AP Portable    Result Date: 1/16/2023  HISTORY: Chest pain,  dyspnea and hypoxia. FINDINGS: Portable chest radiograph at 1110 hours compared to prior exams including 09/14/2022 shows the cardiomediastinal silhouette and pulmonary vasculature are within normal limits. There are aortic vascular calcifications. The lungs are normally expanded, with right lung base airspace opacities medially, nonspecific. There is no lobar consolidation, large pleural effusion, evidence of pulmonary edema, or pneumothorax. No acute fractures or destructive osseous lesions. IMPRESSION: Right lung base airspace opacities, potentially atelectasis, or pneumonia in the appropriate clinical setting. Electronically signed by:  Damon Chaves MD  1/16/2023 12:03 PM CST Workstation: [id number]         Assessment/Plan:     Active Hospital Problems    Diagnosis  POA    *Acute on chronic combined systolic and diastolic CHF, NYHA class 1 [I50.43]  Yes     Priority: 1 - High    Hyponatremia [E87.1]  Unknown    Type 2 diabetes  mellitus with neurologic complication, with long-term current use of insulin [E11.49, Z79.4]  Not Applicable    Steroid-induced hyperglycemia [R73.9, T38.0X5A]  Yes    MDD (major depressive disorder), recurrent episode, moderate [F33.1]  Yes    Microcytic anemia [D50.9]  Yes    Vascular dementia with delirium [F01.50, F05]  Yes    Methamphetamine / cocaine dependence [F15.20]  Yes    Hypothyroid [E03.9]  Yes    CAD (coronary artery disease) [I25.10]  Yes    Hyperkalemia [E87.5]  Yes    Stage 3 severe COPD by GOLD classification [J44.9]  Yes    Hypertension [I10]  Yes    AYDEE on CPAP [G47.33, Z99.89]  Not Applicable      Resolved Hospital Problems   No resolved problems to display.     62-year-old male from [facility] with history as above presented to the emergency room for evaluation of low oxygen saturations with shortness of breadth and fatigue    Plan:  Admit to observation - ICU step-down, acute on chronic heart failure, COPD w/acute respiratory failure with hypoxia, acute on chronic CKD, hyperkalemia  Continue BiPAP for now   Repeat BMP 2/2 hyperkalemia, treated in ED  Continuous cardiac monitor  Pulse oximetry O2 PRN - keep sats >93%, pulse ox q4 with vital signs  Diuresing as per daily assessment  Echocardiogram  Nephrology consult appreciated - acute on chronic CKD  Renal ultrasound  Strict I/O and daily weight  DUO-nebs q4 hours PRN  Daily labs  Electrolytes sliding scale repletion  Continue chronic home medications  Further plan as per clinical course      VTE Risk Mitigation (From admission, onward)           Ordered     Reason for No Pharmacological VTE Prophylaxis  Once        Question:  Reasons:  Answer:  Physician Provided (leave comment)    01/16/23 1353     Place RADHA hose  Until discontinued         01/16/23 1353     IP VTE HIGH RISK PATIENT  Once         01/16/23 1353     Place sequential compression device  Until discontinued         01/16/23 1353                       Libia VASQUES  [facility]  Department of Hospital Medicine   Critical access hospital - Emergency Dept

## 2023-01-16 NOTE — SUBJECTIVE & OBJECTIVE
Past Medical History:   Diagnosis Date    Anxiety     Arthritis     CHF (congestive heart failure)     Chronic kidney disease     COPD (chronic obstructive pulmonary disease)     Coronary artery disease     Diabetes mellitus     Erectile dysfunction     Hypertension     Necrotizing fasciitis of forearm     Peripheral neuropathy     Stroke     TIA    Thyroid disease        Past Surgical History:   Procedure Laterality Date    arm surgery Right     arthroscopy Right     knee    COLONOSCOPY N/A 7/3/2019    Procedure: COLONOSCOPY;  Surgeon: Jagdish Pollock MD;  Location: [facility] ENDO;  Service: Endoscopy;  Laterality: N/A;    ESOPHAGOGASTRODUODENOSCOPY N/A 7/3/2019    Procedure: ESOPHAGOGASTRODUODENOSCOPY (EGD);  Surgeon: Jagdish Pollock MD;  Location: [facility] ENDO;  Service: Endoscopy;  Laterality: N/A;    EYE SURGERY      FUSION, SPINE, POSTERIOR APPROACH N/A 7/29/2022    Procedure: FUSION,SPINE,POSTERIOR APPROACH, T9-L3;  Surgeon: Luis E Lawson MD;  Location: [facility] OR;  Service: Orthopedics;  Laterality: N/A;    ROTATOR CUFF REPAIR Right     TONSILLECTOMY      VERTEBRAL CORPECTOMY N/A 7/27/2022    Procedure: CORPECTOMY T12;  Surgeon: Luis E Lawson MD;  Location: [facility] OR;  Service: Orthopedics;  Laterality: N/A;       Review of patient's allergies indicates:   Allergen Reactions    Onion     Pork/porcine containing products     Shellfish containing products     Shrimp        No current facility-administered medications on file prior to encounter.     Current Outpatient Medications on File Prior to Encounter   Medication Sig    albuterol-ipratropium (DUO-NEB) 2.5 mg-0.5 mg/3 mL nebulizer solution Take 3 mLs by nebulization every 6 (six) hours while awake. Rescue    aspirin 81 MG Chew Take 1 tablet (81 mg total) by mouth once daily.    atorvastatin (LIPITOR) 40 MG tablet Take 1 tablet (40 mg total) by mouth once daily.    clonazePAM (KLONOPIN) 0.5 MG tablet Take 0.5 mg by mouth 2 (two) times daily as needed for  Anxiety.    doxazosin (CARDURA) 2 MG tablet Take 1 tablet (2 mg total) by mouth once daily.    fluticasone furoate-vilanteroL (BREO) 100-25 mcg/dose diskus inhaler Inhale 1 puff into the lungs once daily. Controller    fluvoxaMINE (LUVOX) 25 MG tablet Take 1 tablet (25 mg total) by mouth every evening.    gabapentin (NEURONTIN) 300 MG capsule Take 1 capsule by mouth once daily.    insulin aspart U-100 (NOVOLOG) 100 unit/mL (3 mL) InPn pen Inject 1-10 Units into the skin before meals and at bedtime as needed (Hyperglycemia).    insulin detemir U-100 (LEVEMIR FLEXTOUCH U-100 INSULN) 100 unit/mL (3 mL) InPn pen Inject 10 Units into the skin every evening.    levothyroxine (SYNTHROID) 50 MCG tablet Take 1 tablet (50 mcg total) by mouth once daily.    metoprolol succinate (TOPROL-XL) 25 MG 24 hr tablet Take 1 tablet (25 mg total) by mouth once daily.    NIFEdipine (PROCARDIA-XL) 90 MG (OSM) 24 hr tablet Take 1 tablet (90 mg total) by mouth once daily.    pantoprazole (PROTONIX) 40 MG tablet Take 1 tablet (40 mg total) by mouth once daily.    QUEtiapine (SEROQUEL) 50 MG tablet Take 1 tablet (50 mg total) by mouth 2 (two) times daily.    tamsulosin (FLOMAX) 0.4 mg Cap Take 1 capsule (0.4 mg total) by mouth once daily.    tiotropium bromide (SPIRIVA RESPIMAT) 2.5 mcg/actuation inhaler Inhale 2 puffs into the lungs once daily. Controller    [DISCONTINUED] albuterol (PROVENTIL) 2.5 mg /3 mL (0.083 %) nebulizer solution USE 1 VIAL VIA NEBULIZER EVERY 6 HOURS AS NEEDED 7/9/21    [DISCONTINUED] amLODIPine (NORVASC) 10 MG tablet Take 1 tablet (10 mg total) by mouth once daily.    [DISCONTINUED] furosemide (LASIX) 80 MG tablet Take 1 tablet (80 mg total) by mouth 2 (two) times daily.    [DISCONTINUED] isosorbide-hydrALAZINE 20-37.5 mg (BIDIL) 20-37.5 mg Tab Take 1 tablet by mouth 2 (two) times daily.    [DISCONTINUED] metoprolol tartrate (LOPRESSOR) 50 MG tablet Take 1 tablet (50 mg total) by mouth 2 (two) times daily.     [DISCONTINUED] mirtazapine (REMERON) 30 MG tablet Take 30 mg by mouth every evening.    [DISCONTINUED] omeprazole (PRILOSEC) 40 MG capsule Take 1 capsule (40 mg total) by mouth once daily.    [DISCONTINUED] pioglitazone (ACTOS) 30 MG tablet Take 30 mg by mouth once daily.    [DISCONTINUED] rivastigmine (EXELON) 4.6 mg/24 hour PT24 Place 1 patch onto the skin once daily.    [DISCONTINUED] SITagliptin (JANUVIA) 100 MG Tab Take 100 mg by mouth once daily.    [DISCONTINUED] traZODone (DESYREL) 50 MG tablet Take 50 mg by mouth every evening.     Family History       Problem Relation (Age of Onset)    COPD Mother    Cancer Brother    Diabetes Father          Tobacco Use    Smoking status: Every Day     Packs/day: 1.00     Years: 40.00     Pack years: 40.00     Types: Cigarettes    Smokeless tobacco: Never   Substance and Sexual Activity    Alcohol use: Not Currently    Drug use: Not Currently    Sexual activity: Not on file     Review of Systems   Constitutional:  Positive for fatigue. Negative for chills, diaphoresis and fever.   HENT:  Negative for congestion, postnasal drip, sinus pressure and sore throat.    Eyes:  Negative for visual disturbance.   Respiratory:  Positive for shortness of breath. Negative for cough, chest tightness and wheezing.    Cardiovascular:  Negative for chest pain, palpitations and leg swelling.   Gastrointestinal:  Negative for abdominal distention, abdominal pain, blood in stool, constipation, diarrhea, nausea and vomiting.   Endocrine: Negative.    Genitourinary:  Negative for dysuria.   Musculoskeletal: Negative.    Skin: Negative.    Allergic/Immunologic: Negative.    Neurological:  Negative for dizziness, weakness, numbness and headaches.   Hematological: Negative.    Psychiatric/Behavioral:  Negative for suicidal ideas.         Recently lost wife 2 months    Objective:     Vital Signs (Most Recent):  Temp: 98.8 °F (37.1 °C) (01/16/23 1011)  Pulse: 66 (01/16/23 1106)  Resp: 18 (01/16/23  1011)  BP: (!) 125/58 (23 1011)  SpO2: (!) 92 % (23 1106)   Vital Signs (24h Range):  Temp:  [98.8 °F (37.1 °C)] 98.8 °F (37.1 °C)  Pulse:  [66-73] 66  Resp:  [18] 18  SpO2:  [88 %-92 %] 92 %  BP: (125)/(58) 125/58     Weight: 90.7 kg (200 lb)  Body mass index is 29.53 kg/m².    Physical Exam  Constitutional:       General: He is not in acute distress.     Appearance: Normal appearance. He is well-developed. He is not toxic-appearing or diaphoretic.      Comments: Bipap   HENT:      Head: Normocephalic and atraumatic.   Eyes:      General: Lids are normal.      Conjunctiva/sclera: Conjunctivae normal.      Pupils: Pupils are equal, round, and reactive to light.   Neck:      Thyroid: No thyroid mass or thyromegaly.      Vascular: Normal carotid pulses. No JVD.      Trachea: Trachea normal. No tracheal deviation.   Cardiovascular:      Rate and Rhythm: Normal rate and regular rhythm.      Pulses: Normal pulses.      Heart sounds: Normal heart sounds, S1 normal and S2 normal.   Pulmonary:      Effort: Pulmonary effort is normal.      Breath sounds: Normal breath sounds. Decreased air movement present. No stridor.   Abdominal:      General: Bowel sounds are normal.      Palpations: Abdomen is soft.      Tenderness: There is no abdominal tenderness.   Musculoskeletal:         General: Normal range of motion.      Cervical back: Full passive range of motion without pain, normal range of motion and neck supple.      Right lower le+ Pitting Edema present.      Left lower le+ Pitting Edema present.   Skin:     General: Skin is warm and dry.      Nails: There is no clubbing.   Neurological:      Mental Status: He is alert and oriented to person, place, and time.      Cranial Nerves: No cranial nerve deficit.      Sensory: No sensory deficit.   Psychiatric:         Speech: Speech normal.         Behavior: Behavior normal. Behavior is cooperative.         Thought Content: Thought content normal.          Judgment: Judgment normal.      Comments: Patient  very tearful from recent loss         CRANIAL NERVES     CN III, IV, VI   Pupils are equal, round, and reactive to light.     Significant Labs: All pertinent labs within the past 24 hours have been reviewed.  A1C:   Recent Labs   Lab 07/26/22  0631 09/14/22  1526   HGBA1C 6.1* 6.1*     ABGs:   Recent Labs   Lab 01/16/23  1047   PH 7.323*   PCO2 38.9   HCO3 20.2*   POCSATURATED 89*   BE -6   PO2 60*     Bilirubin:   Recent Labs   Lab 01/10/23  0849 01/16/23  1015   BILITOT 0.6 0.7     BMP:   Recent Labs   Lab 01/16/23  1015   *   *   K 5.7*      CO2 22*   BUN 61*   CREATININE 3.2*   CALCIUM 8.4*     CBC:   Recent Labs   Lab 01/16/23  1015   WBC 8.03   HGB 10.4*   HCT 34.0*        CMP:   Recent Labs   Lab 01/16/23  1015   *   K 5.7*      CO2 22*   *   BUN 61*   CREATININE 3.2*   CALCIUM 8.4*   PROT 6.5   ALBUMIN 3.2*   BILITOT 0.7   ALKPHOS 63   AST 21   ALT 22   ANIONGAP 9     Cardiac Markers:   Recent Labs   Lab 01/16/23  1015   BNP 1,225*       Troponin:   Recent Labs   Lab 01/16/23  1015   TROPONINIHS 73.7*     TSH:   Recent Labs   Lab 01/10/23  0849   TSH 2.703         Significant Imaging: I have reviewed all pertinent imaging results/findings within the past 24 hours.  X-Ray Chest AP Portable    Result Date: 1/16/2023  HISTORY: Chest pain,  dyspnea and hypoxia. FINDINGS: Portable chest radiograph at 1110 hours compared to prior exams including 09/14/2022 shows the cardiomediastinal silhouette and pulmonary vasculature are within normal limits. There are aortic vascular calcifications. The lungs are normally expanded, with right lung base airspace opacities medially, nonspecific. There is no lobar consolidation, large pleural effusion, evidence of pulmonary edema, or pneumothorax. No acute fractures or destructive osseous lesions. IMPRESSION: Right lung base airspace opacities, potentially atelectasis, or pneumonia in  the appropriate clinical setting. Electronically signed by:  Damon Chaves MD  1/16/2023 12:03 PM [facility] Workstation: [id number]

## 2023-01-16 NOTE — PROGRESS NOTES
Automatic Inhaler to Nebulizer Interchange    fluticasone/vilanterol (Breo Ellipta) 100 mcg/25 mcg changed to budesonide 0.5 mg twice daily AND arformoterol 15 mcg twice daily per [facility] Automatic Therapeutic Substitutions Protocol.    Please contact pharmacy at extension [phone] with any questions.     Thank you,   Daly Meade

## 2023-01-16 NOTE — CARE UPDATE
01/16/23 1047   Patient Assessment/Suction   Level of Consciousness (AVPU) alert   Rhythm/Pattern, Respiratory unlabored   PRE-TX-O2   Device (Oxygen Therapy) Oxymask   $ Is the patient on Low Flow Oxygen? Yes   Flow (L/min) 5   SpO2 (!) 91 %   Pulse Oximetry Type Continuous   $ Pulse Oximetry - Multiple Charge Pulse Oximetry - Multiple   Pulse 68   Code Blue/Rapid Response   $ Was an ABG obtained? Arterial Puncture;ISTAT - Blood gas   Blood Gas Puncture   Blood Gas Type arterial   Arterial Site radial artery;right   Collateral Circulation Verified Ashish's Test   Site Preparation alcohol   Pressure Held yes   Oxygen Amount LPM (specify)  (5)   Number of Attempts for ABG? 1   Attempted By? KDAO RRT   Labs   $ Labs Tech Time 15 min   Critical Value Communication   Date Result Received 01/16/23   Time Result Received 1047   Resulting Department of Critical Value RESP   Who communicated critical value from resulting department? KDAO RRT   Critical Test #1 PH   Critical Test #1 Result 7.323   Critical Test #2 PO2   Critical Test #2 Result 60   Critical Test #3  HCO3   Critical Test #3 Result 20.2   Critical Test #4 SO2   Critical Test #4 Result 89   Name of Notified Physician/Designee DR. ROY   Date Notified 01/16/23   Time Notified 1047   Respiratory Evaluation   $ Care Plan Tech Time 15 min

## 2023-01-17 LAB
ALBUMIN SERPL BCP-MCNC: 2.9 G/DL (ref 3.5–5.2)
ALP SERPL-CCNC: 53 U/L (ref 55–135)
ALT SERPL W/O P-5'-P-CCNC: 16 U/L (ref 10–44)
ANION GAP SERPL CALC-SCNC: 10 MMOL/L (ref 8–16)
ANION GAP SERPL CALC-SCNC: 10 MMOL/L (ref 8–16)
AST SERPL-CCNC: 15 U/L (ref 10–40)
BILIRUB SERPL-MCNC: 1.1 MG/DL (ref 0.1–1)
BUN SERPL-MCNC: 67 MG/DL (ref 8–23)
BUN SERPL-MCNC: 68 MG/DL (ref 8–23)
CALCIUM SERPL-MCNC: 8.4 MG/DL (ref 8.7–10.5)
CALCIUM SERPL-MCNC: 8.4 MG/DL (ref 8.7–10.5)
CHLORIDE SERPL-SCNC: 103 MMOL/L (ref 95–110)
CHLORIDE SERPL-SCNC: 104 MMOL/L (ref 95–110)
CO2 SERPL-SCNC: 22 MMOL/L (ref 23–29)
CO2 SERPL-SCNC: 23 MMOL/L (ref 23–29)
CREAT SERPL-MCNC: 3.2 MG/DL (ref 0.5–1.4)
CREAT SERPL-MCNC: 3.2 MG/DL (ref 0.5–1.4)
EST. GFR  (NO RACE VARIABLE): 21.1 ML/MIN/1.73 M^2
EST. GFR  (NO RACE VARIABLE): 21.1 ML/MIN/1.73 M^2
ESTIMATED AVG GLUCOSE: 143 MG/DL (ref 68–131)
GLUCOSE SERPL-MCNC: 129 MG/DL (ref 70–110)
GLUCOSE SERPL-MCNC: 150 MG/DL (ref 70–110)
GLUCOSE SERPL-MCNC: 157 MG/DL (ref 70–110)
GLUCOSE SERPL-MCNC: 186 MG/DL (ref 70–110)
GLUCOSE SERPL-MCNC: 95 MG/DL (ref 70–110)
GLUCOSE SERPL-MCNC: 97 MG/DL (ref 70–110)
HBA1C MFR BLD: 6.6 % (ref 4.5–6.2)
POTASSIUM SERPL-SCNC: 4.1 MMOL/L (ref 3.5–5.1)
POTASSIUM SERPL-SCNC: 4.1 MMOL/L (ref 3.5–5.1)
PROT SERPL-MCNC: 6 G/DL (ref 6–8.4)
SODIUM SERPL-SCNC: 136 MMOL/L (ref 136–145)
SODIUM SERPL-SCNC: 136 MMOL/L (ref 136–145)
TROPONIN I SERPL HS-MCNC: 206.1 PG/ML (ref 0–14.9)
TROPONIN I SERPL HS-MCNC: 206.1 PG/ML (ref 0–14.9)

## 2023-01-17 PROCEDURE — 80053 COMPREHEN METABOLIC PANEL: CPT | Performed by: NURSE PRACTITIONER

## 2023-01-17 PROCEDURE — 25000003 PHARM REV CODE 250: Performed by: NURSE PRACTITIONER

## 2023-01-17 PROCEDURE — 99223 1ST HOSP IP/OBS HIGH 75: CPT | Mod: ,,, | Performed by: GENERAL PRACTICE

## 2023-01-17 PROCEDURE — 99900035 HC TECH TIME PER 15 MIN (STAT)

## 2023-01-17 PROCEDURE — 25000242 PHARM REV CODE 250 ALT 637 W/ HCPCS: Performed by: STUDENT IN AN ORGANIZED HEALTH CARE EDUCATION/TRAINING PROGRAM

## 2023-01-17 PROCEDURE — 96372 THER/PROPH/DIAG INJ SC/IM: CPT | Performed by: NURSE PRACTITIONER

## 2023-01-17 PROCEDURE — 82962 GLUCOSE BLOOD TEST: CPT

## 2023-01-17 PROCEDURE — G0378 HOSPITAL OBSERVATION PER HR: HCPCS

## 2023-01-17 PROCEDURE — 63600175 PHARM REV CODE 636 W HCPCS: Performed by: NURSE PRACTITIONER

## 2023-01-17 PROCEDURE — 27000221 HC OXYGEN, UP TO 24 HOURS

## 2023-01-17 PROCEDURE — 80048 BASIC METABOLIC PNL TOTAL CA: CPT | Performed by: STUDENT IN AN ORGANIZED HEALTH CARE EDUCATION/TRAINING PROGRAM

## 2023-01-17 PROCEDURE — 94761 N-INVAS EAR/PLS OXIMETRY MLT: CPT

## 2023-01-17 PROCEDURE — 94640 AIRWAY INHALATION TREATMENT: CPT

## 2023-01-17 PROCEDURE — 84484 ASSAY OF TROPONIN QUANT: CPT | Performed by: NURSE PRACTITIONER

## 2023-01-17 PROCEDURE — 99223 PR INITIAL HOSPITAL CARE,LEVL III: ICD-10-PCS | Mod: ,,, | Performed by: GENERAL PRACTICE

## 2023-01-17 PROCEDURE — 96376 TX/PRO/DX INJ SAME DRUG ADON: CPT

## 2023-01-17 RX ORDER — FUROSEMIDE 10 MG/ML
40 INJECTION INTRAMUSCULAR; INTRAVENOUS DAILY
Status: DISCONTINUED | OUTPATIENT
Start: 2023-01-17 | End: 2023-01-19

## 2023-01-17 RX ORDER — IPRATROPIUM BROMIDE 0.5 MG/2.5ML
0.5 SOLUTION RESPIRATORY (INHALATION) EVERY 6 HOURS PRN
Status: DISCONTINUED | OUTPATIENT
Start: 2023-01-17 | End: 2023-01-22

## 2023-01-17 RX ADMIN — QUETIAPINE 50 MG: 25 TABLET ORAL at 08:01

## 2023-01-17 RX ADMIN — IPRATROPIUM BROMIDE 0.5 MG: 0.5 SOLUTION RESPIRATORY (INHALATION) at 07:01

## 2023-01-17 RX ADMIN — ASPIRIN 81 MG CHEWABLE TABLET 81 MG: 81 TABLET CHEWABLE at 10:01

## 2023-01-17 RX ADMIN — POLYETHYLENE GLYCOL 3350 17 G: 17 POWDER, FOR SOLUTION ORAL at 10:01

## 2023-01-17 RX ADMIN — TAMSULOSIN HYDROCHLORIDE 0.4 MG: 0.4 CAPSULE ORAL at 10:01

## 2023-01-17 RX ADMIN — FUROSEMIDE 40 MG: 10 INJECTION, SOLUTION INTRAMUSCULAR; INTRAVENOUS at 05:01

## 2023-01-17 RX ADMIN — INSULIN DETEMIR 5 UNITS: 100 INJECTION, SOLUTION SUBCUTANEOUS at 09:01

## 2023-01-17 RX ADMIN — NIFEDIPINE 90 MG: 30 TABLET, FILM COATED, EXTENDED RELEASE ORAL at 10:01

## 2023-01-17 RX ADMIN — DOXAZOSIN 2 MG: 1 TABLET ORAL at 10:01

## 2023-01-17 RX ADMIN — IPRATROPIUM BROMIDE 0.5 MG: 0.5 SOLUTION RESPIRATORY (INHALATION) at 01:01

## 2023-01-17 RX ADMIN — PANTOPRAZOLE SODIUM 40 MG: 40 TABLET, DELAYED RELEASE ORAL at 10:01

## 2023-01-17 RX ADMIN — ARFORMOTEROL TARTRATE 15 MCG: 15 SOLUTION RESPIRATORY (INHALATION) at 07:01

## 2023-01-17 RX ADMIN — LEVOTHYROXINE SODIUM 50 MCG: 0.03 TABLET ORAL at 10:01

## 2023-01-17 RX ADMIN — QUETIAPINE 50 MG: 25 TABLET ORAL at 10:01

## 2023-01-17 RX ADMIN — BUDESONIDE 0.5 MG: 0.5 INHALANT RESPIRATORY (INHALATION) at 07:01

## 2023-01-17 RX ADMIN — ATORVASTATIN CALCIUM 40 MG: 40 TABLET, FILM COATED ORAL at 10:01

## 2023-01-17 RX ADMIN — METOPROLOL SUCCINATE 25 MG: 25 TABLET, EXTENDED RELEASE ORAL at 10:01

## 2023-01-17 NOTE — CONSULTS
[facility]  Department of Cardiology  Consult Note      PATIENT NAME: Pam Gray    MRN: [id number]  TODAY'S DATE: 01/17/2023  ADMIT DATE: 1/16/2023                          CONSULT REQUESTED BY: Luan Liao, *    SUBJECTIVE     PRINCIPAL PROBLEM: Acute on chronic combined systolic and diastolic CHF, NYHA class 1      REASON FOR CONSULT:    From H&P: Pam Gray is a 62 y.o. male with a history as  has a past medical history of Anxiety, Arthritis, CHF (congestive heart failure), Chronic kidney disease, COPD (chronic obstructive pulmonary disease), Coronary artery disease, Diabetes mellitus, Erectile dysfunction, Hypertension, Necrotizing fasciitis of forearm, Peripheral neuropathy, Stroke, and Thyroid disease. who presented to the ED with a Shortness of Breath (Pt coming from [facility] complaining of SOB. Pt was 79% on room air. Pt states that he is suppose to be on oxygen at all times. Received duo nebulizer en route to ed.)     Patient seen today in the ED observe on BiPAP on a 40% of oxygen able to carry on conversation report loss wife 2 months ago. He was recently admitted  to [facility] approximately a week ago and states he was without his oxygen, however endorses oxygen dependent (on home oxygen at 2 L per nasal cannula) for COPD. Report low energy.  He also states he did feel short of breath but was told his oxygen levels were low today.  Patient also reports smoking some type of synthetic (illegal drug).      Denies fever, chills, diaphoresis, dizziness, HA, chest pain, palpitations, NVD, recent trauma or any other associated symptoms.  Does smoke cigarettes, drink or do illegal drugs.      Lab and imaging obtained and reviewed CBC showed WBC 8.0, H/H 10.4/34.0, Cl 1 MCH 24.1,RDW 16.1 .  Chemistry profile shows sodium 132,  potassium 5.7, BUN 61, creatinine 3.2, eGFR 21.1 . BNP 1,225 with Troponin High sensitivity 73.7 EKG shows NSR  on admit . 98.8 respiratory 18, b/p 125/58 SpO2 88%     CXR  IMPRESSION  Right lung base airspace opacities, potentially atelectasis, or pneumonia in the appropriate clinical setting.      Echo 7/25/22  1. The study quality is average.   2. Global left ventricular systolic function is normal. The left   ventricular ejection fraction is 50-55%.   3. Severe aortic valve stenosis is present. Aortic valve area   continuity equation is 0.9 cm?.     4. Moderate calcification of the aortic valve is noted.  Moderate   mitral annular calcification is noted.     5. The left atrium is mildly enlarged. Left atrial diameter is 4.6   cms.     6. Moderate (2+) aortic regurgitation. Mild (1+) mitral regurgitation.   Trace tricuspid regurgitation.     7. The pulmonary artery appears normal.      Per ED provider patient presented to the ED with a complaint of shortness of breath history of CKD CHF COPD with O2 at home. patient recently have a suicide ideation was admitted in the [facility] for 5 to 7days w/o oxygen . In the ER Spo2 in the 70's on room air was put on 5 L oxygen trend up , currently BiPAP. Elevated BUN/CR 61/ 33 with baseline Cr 2. acute on chronic kidney disease nephrology was consulted.         HPI:    Patient is a 62-year-old male who presented to the emergency room with complaints of shortness of breath and hypoxia.  He was brought in from [facility].  Patient initially required BiPAP but is now transitioned to nasal cannula.  Patient has been treated for COPD and also smoking some sort of synthetic illegal drugs.  Chest x-ray shows right lung opacities and possible pneumonia.  Patient is also noted to have severe aortic stenosis on echocardiogram.    Unfortunately the patient is very drowsy today and did not answer many questions in the emergency room when evaluated.        Review of patient's allergies indicates:   Allergen Reactions    Onion     Pork/porcine containing products      Shellfish containing products     Shrimp        Past Medical History:   Diagnosis Date    Anxiety     Arthritis     CHF (congestive heart failure)     Chronic kidney disease     COPD (chronic obstructive pulmonary disease)     Coronary artery disease     Diabetes mellitus     Erectile dysfunction     Hypertension     Necrotizing fasciitis of forearm     Peripheral neuropathy     Stroke     TIA    Thyroid disease      Past Surgical History:   Procedure Laterality Date    arm surgery Right     arthroscopy Right     knee    COLONOSCOPY N/A 7/3/2019    Procedure: COLONOSCOPY;  Surgeon: Jagdish Pollock MD;  Location: [facility] ENDO;  Service: Endoscopy;  Laterality: N/A;    ESOPHAGOGASTRODUODENOSCOPY N/A 7/3/2019    Procedure: ESOPHAGOGASTRODUODENOSCOPY (EGD);  Surgeon: Jagdish Pollock MD;  Location: [facility] ENDO;  Service: Endoscopy;  Laterality: N/A;    EYE SURGERY      FUSION, SPINE, POSTERIOR APPROACH N/A 7/29/2022    Procedure: FUSION,SPINE,POSTERIOR APPROACH, T9-L3;  Surgeon: Luis E Lawson MD;  Location: [facility] OR;  Service: Orthopedics;  Laterality: N/A;    ROTATOR CUFF REPAIR Right     TONSILLECTOMY      VERTEBRAL CORPECTOMY N/A 7/27/2022    Procedure: CORPECTOMY T12;  Surgeon: Luis E Lawson MD;  Location: [facility] OR;  Service: Orthopedics;  Laterality: N/A;     Social History     Tobacco Use    Smoking status: Every Day     Packs/day: 1.00     Years: 40.00     Pack years: 40.00     Types: Cigarettes    Smokeless tobacco: Never   Substance Use Topics    Alcohol use: Not Currently    Drug use: Not Currently        REVIEW OF SYSTEMS    RESPIRATORY: Negative for cough, +shortness of breath   CARDIOVASCULAR: Negative for chest pain. Negative for palpitations and leg swelling.       OBJECTIVE     VITAL SIGNS (Most Recent)  Temp: 98.8 °F (37.1 °C) (01/16/23 1011)  Pulse: 73 (01/17/23 1401)  Resp: 18 (01/17/23 1326)  BP: (!) 125/58 (01/17/23 1401)  SpO2: 97 % (01/17/23 1401)    VENTILATION STATUS  Resp: 18 (01/17/23  1326)  SpO2: 97 % (01/17/23 1401)  Oxygen Concentration (%):  [40] 40    I & O (Last 24H):  Intake/Output Summary (Last 24 hours) at 1/17/2023 1637  Last data filed at 1/17/2023 0200  Gross per 24 hour   Intake --   Output 1700 ml   Net -1700 ml       WEIGHTS  Wt Readings from Last 1 Encounters:   01/16/23 1011 90.7 kg (200 lb)       PHYSICAL EXAM  CONSTITUTIONAL: No fever, no chills  HEENT: Normocephalic, atraumatic,pupils reactive to light                 NECK:  No JVD no carotid bruit  CVS: S1S2+, RRR, + murmur  LUNGS: Clear  ABDOMEN: Soft, NT, BS+  EXTREMITIES: No cyanosis, edema  : No burr catheter  NEURO: AAO X 3  PSY: Normal affect      HOME MEDICATIONS:  No current facility-administered medications on file prior to encounter.     Current Outpatient Medications on File Prior to Encounter   Medication Sig Dispense Refill    albuterol-ipratropium (DUO-NEB) 2.5 mg-0.5 mg/3 mL nebulizer solution Take 3 mLs by nebulization every 6 (six) hours while awake. Rescue 270 mL 0    aspirin 81 MG Chew Take 1 tablet (81 mg total) by mouth once daily. 30 tablet 0    atorvastatin (LIPITOR) 40 MG tablet Take 1 tablet (40 mg total) by mouth once daily. 30 tablet 0    clonazePAM (KLONOPIN) 0.5 MG tablet Take 0.5 mg by mouth 2 (two) times daily as needed for Anxiety.      doxazosin (CARDURA) 2 MG tablet Take 1 tablet (2 mg total) by mouth once daily. 30 tablet 0    fluticasone furoate-vilanteroL (BREO) 100-25 mcg/dose diskus inhaler Inhale 1 puff into the lungs once daily. Controller 30 each 0    fluvoxaMINE (LUVOX) 25 MG tablet Take 1 tablet (25 mg total) by mouth every evening. 90 tablet 1    gabapentin (NEURONTIN) 300 MG capsule Take 1 capsule by mouth once daily.      insulin aspart U-100 (NOVOLOG) 100 unit/mL (3 mL) InPn pen Inject 1-10 Units into the skin before meals and at bedtime as needed (Hyperglycemia).  0    insulin detemir U-100 (LEVEMIR FLEXTOUCH U-100 INSULN) 100 unit/mL (3 mL) InPn pen Inject 10 Units into the  skin every evening.  0    levothyroxine (SYNTHROID) 50 MCG tablet Take 1 tablet (50 mcg total) by mouth once daily.      metoprolol succinate (TOPROL-XL) 25 MG 24 hr tablet Take 1 tablet (25 mg total) by mouth once daily. 30 tablet 0    NIFEdipine (PROCARDIA-XL) 90 MG (OSM) 24 hr tablet Take 1 tablet (90 mg total) by mouth once daily. 30 tablet 0    pantoprazole (PROTONIX) 40 MG tablet Take 1 tablet (40 mg total) by mouth once daily. 30 tablet 0    QUEtiapine (SEROQUEL) 50 MG tablet Take 1 tablet (50 mg total) by mouth 2 (two) times daily. 60 tablet 11    tamsulosin (FLOMAX) 0.4 mg Cap Take 1 capsule (0.4 mg total) by mouth once daily.      tiotropium bromide (SPIRIVA RESPIMAT) 2.5 mcg/actuation inhaler Inhale 2 puffs into the lungs once daily. Controller 4 g 0    [DISCONTINUED] albuterol (PROVENTIL) 2.5 mg /3 mL (0.083 %) nebulizer solution USE 1 VIAL VIA NEBULIZER EVERY 6 HOURS AS NEEDED 7/9/21      [DISCONTINUED] amLODIPine (NORVASC) 10 MG tablet Take 1 tablet (10 mg total) by mouth once daily. 30 tablet 11    [DISCONTINUED] furosemide (LASIX) 80 MG tablet Take 1 tablet (80 mg total) by mouth 2 (two) times daily. 60 tablet 0    [DISCONTINUED] isosorbide-hydrALAZINE 20-37.5 mg (BIDIL) 20-37.5 mg Tab Take 1 tablet by mouth 2 (two) times daily. 60 tablet 0    [DISCONTINUED] metoprolol tartrate (LOPRESSOR) 50 MG tablet Take 1 tablet (50 mg total) by mouth 2 (two) times daily. 60 tablet 11    [DISCONTINUED] mirtazapine (REMERON) 30 MG tablet Take 30 mg by mouth every evening.      [DISCONTINUED] omeprazole (PRILOSEC) 40 MG capsule Take 1 capsule (40 mg total) by mouth once daily. 30 capsule 11    [DISCONTINUED] pioglitazone (ACTOS) 30 MG tablet Take 30 mg by mouth once daily.      [DISCONTINUED] rivastigmine (EXELON) 4.6 mg/24 hour PT24 Place 1 patch onto the skin once daily. 30 patch 11    [DISCONTINUED] SITagliptin (JANUVIA) 100 MG Tab Take 100 mg by mouth once daily.      [DISCONTINUED] traZODone (DESYREL) 50 MG  tablet Take 50 mg by mouth every evening.         SCHEDULED MEDS:   arformoteroL  15 mcg Nebulization BID    aspirin  81 mg Oral Daily    atorvastatin  40 mg Oral Daily    budesonide  0.5 mg Nebulization Q12H    doxazosin  2 mg Oral Daily    fluvoxaMINE  25 mg Oral QHS    insulin detemir U-100  5 Units Subcutaneous QHS    ipratropium  0.5 mg Nebulization Q6H    levothyroxine  50 mcg Oral Before breakfast    metoprolol succinate  25 mg Oral Daily    NIFEdipine  90 mg Oral Daily    pantoprazole  40 mg Oral Before breakfast    polyethylene glycol  17 g Oral Daily    QUEtiapine  50 mg Oral BID    tamsulosin  0.4 mg Oral Daily       CONTINUOUS INFUSIONS:    PRN MEDS:clonazePAM, dextrose 10%, dextrose 10%, glucagon (human recombinant), glucose, glucose, insulin aspart U-100, naloxone, sodium chloride 0.9%    LABS AND DIAGNOSTICS     CBC LAST 3 DAYS  Recent Labs   Lab 01/16/23  1015 01/16/23  1807   WBC 8.03 7.16   RBC 4.31* 4.08*   HGB 10.4* 9.7*   HCT 34.0* 31.9*   MCV 79* 78*   MCH 24.1* 23.8*   MCHC 30.6* 30.4*   RDW 16.1* 16.2*    127*   MPV 10.9 10.4   GRAN 74.1*  6.0 76.9*  5.5   LYMPH 14.3*  1.2 11.6*  0.8*   MONO 9.7  0.8 9.8  0.7   BASO 0.03 0.02   NRBC 0 0       COAGULATION LAST 3 DAYS  No results for input(s): LABPT, INR, APTT in the last 168 hours.    CHEMISTRY LAST 3 DAYS  Recent Labs   Lab 01/16/23  1015 01/16/23  1047 01/16/23  1250 01/16/23  1542 01/16/23  1832 01/17/23  0504   *  --  132*  --  136 136  136   K 5.7*  --  5.7*  --  4.6 4.1  4.1     --  102  --  101 104  103   CO2 22*  --  21*  --  23 22*  23   ANIONGAP 9  --  9  --  12 10  10   BUN 61*  --  65*  --  62* 68*  67*   CREATININE 3.2*  --  3.2*  --  3.1* 3.2*  3.2*   *  --  115*  --  135* 97  95   CALCIUM 8.4*  --  8.4*  --  8.8 8.4*  8.4*   PH  --  7.323*  --   --   --   --    MG  --   --   --  2.6  --   --    ALBUMIN 3.2*  --  3.2*  --   --  2.9*   PROT 6.5  --   --   --   --  6.0   ALKPHOS 63  --   --    --   --  53*   ALT 22  --   --   --   --  16   AST 21  --   --   --   --  15   BILITOT 0.7  --   --   --   --  1.1*       CARDIAC PROFILE LAST 3 DAYS  Recent Labs   Lab 01/16/23  1015 01/16/23  1250 01/16/23  1542 01/17/23  0504   BNP 1,225*  --  1,118*  --    CPK  --   --  43  --    TROPONINIHS 73.7* 112.1* 143.1* 206.1*  206.1*       ENDOCRINE LAST 3 DAYS  No results for input(s): TSH, PROCAL in the last 168 hours.    LAST ARTERIAL BLOOD GAS  ABG  Recent Labs   Lab 01/16/23  1047   PH 7.323*   PO2 60*   PCO2 38.9   HCO3 20.2*   BE -6       LAST 7 DAYS MICROBIOLOGY   Microbiology Results (last 7 days)       ** No results found for the last 168 hours. **            MOST RECENT IMAGING  Echo  · The left ventricle is normal in size with moderate concentric   hypertrophy and normal systolic function.  · The estimated ejection fraction is 60%.  · Indeterminate left ventricular diastolic function.  · Normal right ventricular size with normal right ventricular systolic   function.  · Mild left atrial enlargement.  · There is severe aortic valve stenosis.  · Aortic valve area is 0.98 cm2; peak velocity is 4.2 m/s; mean gradient   is 45 mmHg.  · Mild tricuspid regurgitation.  · Normal central venous pressure (3 mmHg).  · The estimated PA systolic pressure is 16 mmHg.  · Mildly elevated mean gradient across mitral valve with normal mitral   valve area by pressure half time.     US Retroperitoneal Complete  Renal ultrasound    HISTORY: Abnormal renal function.    The right kidney measures 10.6 cm in sagittal dimension. The left kidney measures 9.7 cm. Left upper pole renal cortical cysts measure up to 3.2 cm. There are no solid masses or hydronephrosis is observed.    A trace amount of perinephric fluid is seen about the right kidney. The midline retroperitoneum is predominantly obscured. Incidental observation is made of a small right-sided pleural effusion.    The urinary bladder is distended but otherwise demonstrates no  abnormality.    IMPRESSION:    Left renal cysts.    Trace amount of right-sided perinephric free fluid    Distention of the urinary bladder.    Obscuration of the midline retroperitoneum.    Electronically signed by:  Chuy Patton MD  1/16/2023 2:05 PM CST Workstation: [id number]  X-Ray Chest AP Portable  HISTORY: Chest pain,  dyspnea and hypoxia.    FINDINGS: Portable chest radiograph at 1110 hours compared to prior exams including 09/14/2022 shows the cardiomediastinal silhouette and pulmonary vasculature are within normal limits. There are aortic vascular calcifications.    The lungs are normally expanded, with right lung base airspace opacities medially, nonspecific. There is no lobar consolidation, large pleural effusion, evidence of pulmonary edema, or pneumothorax. No acute fractures or destructive osseous lesions.    IMPRESSION: Right lung base airspace opacities, potentially atelectasis, or pneumonia in the appropriate clinical setting.    Electronically signed by:  Damon Chaves MD  1/16/2023 12:03 PM CST Workstation: [id number]      ECHOCARDIOGRAM RESULTS (last 5)  Results for orders placed during the hospital encounter of 01/16/23    Echo    Interpretation Summary  · The left ventricle is normal in size with moderate concentric hypertrophy and normal systolic function.  · The estimated ejection fraction is 60%.  · Indeterminate left ventricular diastolic function.  · Normal right ventricular size with normal right ventricular systolic function.  · Mild left atrial enlargement.  · There is severe aortic valve stenosis.  · Aortic valve area is 0.98 cm2; peak velocity is 4.2 m/s; mean gradient is 45 mmHg.  · Mild tricuspid regurgitation.  · Normal central venous pressure (3 mmHg).  · The estimated PA systolic pressure is 16 mmHg.  · Mildly elevated mean gradient across mitral valve with normal mitral valve area by pressure half time.      Results for orders placed during the hospital encounter of  07/25/22    2D echo with color flow doppler    Narrative  Transthoracic Echocardiogram Report    Patient Name  : DON REYNOLDS  [facility]  [address] [place] [address]    Phone: [phone]    Interpreting Physician: CAMILA BALBUENA MD  Demographics:  Name:  DON REYNOLDS   YOB: 1960  Age/Sex:  62, Male   Height: 5 ft 9 in  Weight:   198 pounds    BSA: 2.11 cc/m?  BMI:      29.2   Date of Study: 07/26/2022  Medical Record No:   [id number]   Location: [id number]  Referring Physician:   CAMILA BALBUENA   Technologist: LAURIE Mesa  Study:   Trans Thoracic Echocardiogram  Study Quality:   Average  Procedure  Type: Adult TTE (Date Study Ordered : 07/26/2022)  Components: 2D,Color Flow Doppler,Doppler,M-mode,TDI(Tissue Doppler  Imaging)  Referral diagnosis  Cardiomyopathy, ischemic  Hemodynamics  Heart rate: 54 beats/min  Blood pressure:  140/65 mmHg  ECG:    Final Impressions  1. The study quality is average.  2. Global left ventricular systolic function is normal. The left  ventricular ejection fraction is 50-55%.  3. Severe aortic valve stenosis is present. Aortic valve area  continuity equation is 0.9 cm?.  4. Moderate calcification of the aortic valve is noted.  Moderate  mitral annular calcification is noted.  5. The left atrium is mildly enlarged. Left atrial diameter is 4.6  cms.  6. Moderate (2+) aortic regurgitation. Mild (1+) mitral regurgitation.  Trace tricuspid regurgitation.  7. The pulmonary artery appears normal.    Intra-modality comparison (Echocardiogram)  This echocardiogram when compared with the latest echocardiogram[place]TTE  [place]/5/2022 shows:  1. Ejection fraction has increased from 30% to 50%.  2. Left atrium size changed from severely increased to mildly  increased and left ventricle size changed from mildly increased to  normal is noted in the current study.  3. Mitral valve area by pressure halftime essentially remained  unchanged (2.3 cm?  previous study, 2 cm? current study).    Findings    Left Atrium  The left atrium is mildly enlarged. Left atrial diameter is 4.6 cms.    Right Atrium  The right atrium is normal.    Left Ventricle  The left ventricle is normal in size. Left ventricular diastolic  dimension is 5.1 cms. Left ventricular systolic dimension is 3.6 cms.  Left ventricular diastolic septal thickness is 1.5 cms. Left  ventricular diastolic postero basal free wall thickness is 1.5 cms.  Global left ventricular systolic function is borderline normal. The  left ventricular fractional shortening is 29.5%. The left ventricular  ejection fraction is 50%. Left ventricular outflow tract diameter is 2  cms. Left ventricular outflow tract VTI is 24.8 cms. Left ventricular  outflow tract mean velocity is 0.7 m/s. Left ventricular mean gradient  is 2 mmHg.    Right Ventricle  Right ventricular systolic function is normal. Right ventricular  diastolic dimension is 3.8 cms.    Atrial Septum  The atrial septum is normal.    Ventricular Septum  The ventricular septum is normal.    Pulmonary Vein  The pulmonary vein appears normal.    IVC  The inferior vena cava is normal.    Pulmonic Valve  The peak velocity is 0.8 m/s. The mean velocity is 0.7 m/s. The peak  trans pulmonic gradient is 3 mmHg. The mean trans pulmonic gradient is  2 mmHg.    Tricuspid Valve  Evidence of tricuspid regurgitation is present. Trace tricuspid  regurgitation.    Mitral Valve  Moderate mitral annular calcification is noted. Evidence of mitral  stenosis is noted. The area by pressure half time is 2 cm?. The mean  trans mitral gradient is 2 mmHg. Mitral regurgitation is noted. Mild  (1+) mitral regurgitation. The pressure half time is 108 ms. The  deceleration time is 367 ms. The E velocity is 1 m/s. The A velocity  is 1 m/s.    Aortic Valve  Noted evidence of aortic stenosis. Severe aortic valve stenosis is  present. Aortic valve area continuity equation is 0.9 cm?.  Noted  evidence of aortic regurgitation. Moderate (2+) aortic regurgitation.  The trans-aortic peak velocity is 3.1 m/s. The trans-aortic peak  gradient is 40 mmHg. The trans-aortic mean velocity is 2.2 m/s. The  trans-aortic mean gradient is 23 mmHg. The regurgitation pressure half  time is 550 ms. Aortic valve VTI measures 82.5 cms. Moderate  calcification of the aortic valve is noted.  LVOT Diameter is 2 cms.    Aorta  Aortic root diameter is 3.1 cms.    Pericardium  The pericardium is normal in appearance.    Measurements  Left Atrium  Diameter   4.6cm(s)  Volume   50.7ml  Volume Index   24ml/m?    Left Ventricle  End Diastolic Dimension   5.1cm(s)  End Systolic Dimension   3.6cm(s)  Posterior Basal Free Wall Thickness   1.5cm(s)  End Diastolic Septal Thickness   1.5cm(s)  Ejection Fraction   50%  Fractional Shortening   29.5    Right Ventricle  Diastolic Diameter   3.8cm(s)    Pulmonic Valve  Peak Pulmonic Velocity   0.8m/s  Mean Pulmonic Velocity   0.7m/s  Peak Trans Pulmonic Gradient   3mmHg  Mean Trans Pulmonic Gradient   2mmHg    Mitral Valve  Pressure Half Time   108ms  Deceleration Time   367ms  A Velocity   1m/s  E Velocity   1m/s  Mean Trans Mitral Gradient   2mmHg  Area By Pressure Half Time   2cm?    Aortic Valve  Trans Aortic Peak Velocity   3.1m/s  Trans Aortic Mean Velocity   2.2m/s  Trans Aortic Peak Gradient   40mmHg  Trans Aortic Mean Gradient   23mmHg  AV Area Indexed To BSA   0.4cm?/m?  Area By Continuity Equation   0.9cm?  Regurgitation Pressure Half Time   550ms  Aortic Valve VTI   82.5cm(s)  LVOT Diameter   2cm(s)        Electronically Authenticated by  CAMILA BALBUENA MD  07/26/2022 , 11:29      Results for orders placed during the hospital encounter of 04/04/22    2D echo with color flow doppler    Narrative  Transthoracic Echocardiogram Report    Patient Name  : DON REYNOLDS  [facility]  [address] [place] [id number]    Phone: [phone]    Interpreting  Physician: ARGENIS SPRINGER MD  Demographics:  Name:  DON REYNOLDS   YOB: 1960  Age/Sex:  61, Male   Height: 5 ft 11 in  Weight:   210 pounds    BSA: 2.21 cc/m?  BMI:      29.3   Date of Study: 04/05/2022  Medical Record No:   [id number]   Location: 1606  Referring Physician:   JOE FRANCO   Technologist: Tracey SULLIVAN  Study:   Trans Thoracic Echocardiogram  Study Quality:   Good  Procedure  Type: Adult TTE (Date Study Ordered : 04/05/2022)  Components: 2D,Color Flow Doppler,Doppler,M-mode,TDI(Tissue Doppler  Imaging)  Referral diagnosis  CHF  Hemodynamics  Heart rate: 0 beats/min  Blood pressure:  ECG: Normal Sinus Rhythm    Final Impressions  1. The study quality is good.  2. The left ventricle is mildly enlarged. Global left ventricular  systolic function is severely decreased. The left ventricular ejection  fraction is 30%. Left ventricular diastolic function is abnormal  (stage I impaired relaxation).  3. The left atrium is severely enlarged.  4. The aortic valve is tricuspid. Noted evidence of aortic stenosis.  Moderate aortic valve stenosis is present. Aortic valve area  continuity equation is 1.47 cm?. Noted evidence of aortic  regurgitation. Moderate (2+) aortic regurgitation. The trans-aortic  mean gradient is 27 mmHg.  5. The right ventricle is normal in size. Right ventricular systolic  function is normal.    Intra-modality comparison (Echocardiogram)  This echocardiogram when compared with the latest echocardiogram-TTE  ([facility]) dated 7/9/2021 shows:  1. Ejection fraction has increased from 20% to 30%.  2. Left ventricle size changed from normal to mildly increased is  noted in the current study.  3. Mitral valve area by pressure halftime has decreased from 3.7 cm?  to 2.3 cm?.    Findings    Left Atrium  The left atrium is severely enlarged.    Right Atrium  The right atrium is normal.    Left Ventricle  The left ventricle is mildly enlarged. Left ventricular diastolic  dimension  is 6.1 cms. Left ventricular systolic dimension is 5.3 cms.  Left ventricular diastolic septal thickness is 1 cm. Left ventricular  diastolic postero basal free wall thickness is 1 cm. Global left  ventricular systolic function is severely decreased. The left  ventricular fractional shortening is 13.4%. The left ventricular  ejection fraction is 30%. Left ventricular diastolic function is  abnormal (stage I impaired relaxation). Left ventricular outflow tract  diameter is 2.1 cms. Left ventricular outflow tract VTI is 27.4 cms.  Left ventricular outflow tract mean velocity is 1 m/s. Left  ventricular mean gradient is 5 mmHg.    Right Ventricle  The right ventricle is normal in size. Right ventricular systolic  function is normal. Right ventricular diastolic dimension is 2.4 cms.    Atrial Septum  The atrial septum is normal.    Ventricular Septum  The ventricular septum is normal.    Pulmonary Vein  The pulmonary vein appears normal.    IVC  The inferior vena cava is normal.    Pulmonic Valve  The mean velocity is 0.8 m/s. The mean trans pulmonic gradient is 3  mmHg.    Tricuspid Valve  Evidence of tricuspid regurgitation is present. Trace tricuspid  regurgitation.    Mitral Valve  Mitral regurgitation is noted. Mild (1+) mitral regurgitation. The  pressure half time is 95 ms. The deceleration time is 323 ms. The E  velocity is 1.1 m/s. The A velocity is 1.4 m/s. Mild calcification of  the mitral subvalvular apparatus is noted.    Aortic Valve  The aortic valve is tricuspid. Noted evidence of aortic stenosis.  Moderate aortic valve stenosis is present. Aortic valve area  continuity equation is 1.47 cm?. Noted evidence of aortic  regurgitation. Moderate (2+) aortic regurgitation. The trans-aortic  mean velocity is 2.5 m/s. The trans-aortic mean gradient is 27 mmHg.  The regurgitation pressure half time is 331 ms. Aortic valve VTI  measures 57.5 cms. Moderate calcification of the aortic valve is  noted.  LVOT Diameter  is 2.1 cms.    Aorta  Aortic root diameter is 3.4 cms.    Pericardium  The pericardium is normal in appearance.    Measurements  Left Atrium  Volume   88ml  Volume Index   39.8ml/m?    Left Ventricle  End Diastolic Dimension   6.1cm(s)  End Systolic Dimension   5.3cm(s)  Posterior Basal Free Wall Thickness   1cm(s)  End Diastolic Septal Thickness   1cm(s)  Ejection Fraction   30%  Fractional Shortening   13.4    Right Ventricle  Diastolic Diameter   2.4cm(s)    Pulmonic Valve  Mean Pulmonic Velocity   0.8m/s  Mean Trans Pulmonic Gradient   3mmHg    Mitral Valve  Pressure Half Time   95ms  Deceleration Time   323ms  A Velocity   1.4m/s  E Velocity   1.1m/s  Area By Pressure Half Time   2.3cm?    Aortic Valve  Trans Aortic Mean Velocity   2.5m/s  Trans Aortic Mean Gradient   27mmHg  AV Area Indexed To BSA   0.7cm?/m?  Area By Continuity Equation   1.47cm?  Regurgitation Pressure Half Time   331ms  Aortic Valve VTI   57.5cm(s)  LVOT Diameter   2.1cm(s)        Electronically Authenticated by  ARGENIS SPRINGER MD  04/05/2022 , 21:09      Results for orders placed during the hospital encounter of 07/08/21    2D echo with color flow doppler    Narrative  Transthoracic Echocardiogram Report    Patient Name  : [place] [address]    Phone: [phone]    Interpreting Physician: CAMILA BALBUENA MD  Demographics:  Name:  DON REYNOLDS   YOB: 1960  Age/Sex:  61, Male   Height: 5 ft 8 in  Weight:   287 pounds    BSA: 2.56 cc/m?  BMI:      43.6   Date of Study: 07/09/2021  Medical Record No:   [id number]   Location: [id number]  Referring Physician:   ELODIA FISCHER   Technologist: Edna Ortiz RDCS,YENIFERT  Study:   Trans Thoracic Echocardiogram  Study Quality:   Average  Procedure  Type: Adult TTE (Date Study Ordered : 07/09/2021)  Components: 2D,Color Flow Doppler,Doppler,M-mode,TDI(Tissue Doppler  Imaging)  Referral diagnosis  CHF  Hemodynamics  Heart rate: 123  beats/min  Blood pressure:  118/66 mmHg  ECG:    Final Impressions  1. The study quality is average.  2. Global left ventricular systolic function is severely decreased.  The left ventricular ejection fraction is 20%. Optison was attempted  but IV was not accessible. Can repeat with optison if needed once new  IV is in place.  3. Left ventricular diastolic function is iabnormal. Stage III  restrictive pattern  4. The left atrium is severely enlarged. Left atrial diameter is 5.5  cms.  5. Right ventricular systolic function is severely decreased. Right  ventricular diastolic dimension is 4.2 cms. Right ventricular systolic  pressure is 58 mmHg. TAPSE measures 1.2cm.  6. Evidence of mitral stenosis is noted. The area by pressure half  time is 3.7 cm?. The mean trans mitral gradient is 5 mmHg.  7. Moderate to severe aortic valve stenosis is present. Aortic valve  area continuity equation is 1.2 cm?. The trans-aortic peak velocity is  3.6 m/s. The trans-aortic peak gradient is 52 mmHg. The trans-aortic  mean velocity is 2.1 m/s. The trans-aortic mean gradient is 22 mmHg.  Aortic valve VTI measures 61.4 cms. Severe calcification of the aortic  valve is noted.  LVOT Diameter is 2.1 cms.  8. Mild (1+) aortic regurgitation. Mild (1+) mitral regurgitation.  Mild (1+) tricuspid regurgitation.  9. The pulmonary artery systolic pressure is 58 mmHg. Evidence of  pulmonary hypertension is noted.  10. A small pericardial effusion is noted. It is an anterior and  posterior pericardial effusion. The pericardial effusion thickness is  1.8 cms.    Findings    Left Atrium  The left atrium is severely enlarged. Left atrial diameter is 5.5 cms.    Right Atrium  The right atrium is normal in size. Right atrial diameter is 4.4 cms.    Left Ventricle  The left ventricle is normal in size. Left ventricular diastolic  dimension is 5.1 cms. Left ventricular systolic dimension is 4.1 cms.  Left ventricular diastolic septal thickness is 1.4 cms.  Left  ventricular diastolic postero basal free wall thickness is 1.7 cms.  Global left ventricular systolic function is severely decreased. The  left ventricular ejection fraction is 20%. Left ventricular diastolic  function is indeterminate. Left ventricular outflow tract VTI is 23.9  cms. Left ventricular outflow tract mean velocity is 0.7 m/s. Left  ventricular mean gradient is 3 mmHg.    Right Ventricle  Right ventricular systolic function is severely decreased. Right  ventricular diastolic dimension is 4.2 cms. Right ventricular systolic  pressure is 58 mmHg. TAPSE measures 1.2cm.    Atrial Septum  The atrial septum is normal.    Ventricular Septum  The ventricular septum is normal.    Pulmonary Artery  The pulmonary artery systolic pressure is 58 mmHg. Evidence of  pulmonary hypertension is noted.    Pulmonary Vein  The pulmonary vein appears normal.    IVC  The inferior vena cava is normal.    Pulmonic Valve  Good excursion of the pulmonic valve cusps is noted. The peak velocity  is 1 m/s. The mean velocity is 0.7 m/s. The peak trans pulmonic  gradient is 4 mmHg. The mean trans pulmonic gradient is 2 mmHg.    Tricuspid Valve  Evidence of tricuspid regurgitation is present. Mild (1+) tricuspid  regurgitation. The peak tricuspid regurgitant velocity is 3.5 m/s. The  peak trans tricuspid gradient is 50 mmHg.    Mitral Valve  Mobility of the anterior mitral leaflet is mildly decreased. Mobility  of the posterior mitral leaflet is mildly decreased. Mild  calcification of the mitral valve is noted. Evidence of mitral  stenosis is noted. The area by pressure half time is 3.7 cm?. The mean  trans mitral gradient is 5 mmHg. Mitral regurgitation is noted. Mild  (1+) mitral regurgitation. The pressure half time is 59 ms. The  deceleration time is 203 ms. The E velocity is 1.7 m/s. The A velocity  is 0.6 m/s.    Aortic Valve  The aortic valve is tricuspid. Noted evidence of aortic stenosis.  Moderate to severe aortic  valve stenosis is present. Aortic valve area  continuity equation is 1.2 cm?. Noted evidence of aortic  regurgitation. Mild (1+) aortic regurgitation. The trans-aortic peak  velocity is 3.6 m/s. The trans-aortic peak gradient is 52 mmHg. The  trans-aortic mean velocity is 2.1 m/s. The trans-aortic mean gradient  is 22 mmHg. Aortic valve VTI measures 61.4 cms. Severe calcification  of the aortic valve is noted.  LVOT Diameter is 2.1 cms.    Aorta  Aortic root diameter is 2.7 cms. The aortic arch is normal.    Pericardium  A small pericardial effusion is noted. It is an anterior and posterior  pericardial effusion. The pericardial effusion thickness is 1.8 cms.    Measurements  Left Atrium  Diameter   5.5cm(s)  Volume   83.9ml  Volume Index   32.8ml/m?    Right Atrium  Diameter   4.4cm(s)    Left Ventricle  End Diastolic Dimension   5.1cm(s)  End Systolic Dimension   4.1cm(s)  Posterior Basal Free Wall Thickness   1.7cm(s)  End Diastolic Septal Thickness   1.4cm(s)  Ejection Fraction   20%    Right Ventricle  Diastolic Diameter   4.2cm(s)    Pulmonic Valve  Peak Pulmonic Velocity   1m/s  Mean Pulmonic Velocity   0.7m/s  Peak Trans Pulmonic Gradient   4mmHg  Mean Trans Pulmonic Gradient   2mmHg    Tricuspid Valve  Peak Tricuspid Regurgitant Velocity   3.5m/s  Peak Tricuspid Regurgitant Gradient   50mmHg  Pulmonary Artery Systolic Pressure  58mmHg    Mitral Valve  Pressure Half Time   59ms  Deceleration Time   203ms  A Velocity   0.6m/s  E Velocity   1.7m/s  Mean Trans Mitral Gradient   5mmHg  Area By Pressure Half Time   3.7cm?    Aortic Valve  Trans Aortic Peak Velocity   3.6m/s  Trans Aortic Mean Velocity   2.1m/s  Trans Aortic Peak Gradient   52mmHg  Trans Aortic Mean Gradient   22mmHg  AV Area Indexed To BSA   0.5cm?/m?  Area By Continuity Equation   1.2cm?  Aortic Valve VTI   61.4cm(s)  LVOT Diameter   2.1cm(s)    Pericardium  Pericardial Effusion Length   1.8cm(s)        Electronically Authenticated by  SREE  CAMILA SPENCE  07/09/2021 , 10:05      Results for orders placed during the hospital encounter of 07/03/19    ECHO REPORT      CURRENT/PREVIOUS VISIT EKG  Results for orders placed or performed during the hospital encounter of 01/16/23   EKG 12-lead    Collection Time: 01/16/23  3:50 PM    Narrative    Test Reason : R77.8,    Vent. Rate : 074 BPM     Atrial Rate : 074 BPM     P-R Int : 154 ms          QRS Dur : 112 ms      QT Int : 396 ms       P-R-T Axes : 056 -10 108 degrees     QTc Int : 439 ms    Sinus rhythm with Premature atrial complexes  Septal infarct (cited on or before 16-JAN-2023)  T wave abnormality, consider lateral ischemia  Abnormal ECG  When compared with ECG of 16-JAN-2023 10:25,  Premature atrial complexes are now Present  Serial changes of Septal infarct Present    Referred By: HANNAH   SELF           Confirmed By:            ASSESSMENT/PLAN:     Active Hospital Problems    Diagnosis    *Acute on chronic combined systolic and diastolic CHF, NYHA class 1    Hyponatremia    Acute respiratory failure with hypoxia    Type 2 diabetes mellitus with neurologic complication, with long-term current use of insulin    Steroid-induced hyperglycemia    MDD (major depressive disorder), recurrent episode, moderate    Microcytic anemia    Vascular dementia with delirium    Methamphetamine / cocaine dependence    Hypothyroid    CAD (coronary artery disease)    Hyperkalemia    Stage 3 severe COPD by GOLD classification    Hypertension    AYDEE on CPAP       ASSESSMENT & PLAN:     Acute on chronic combined systolic and diastolic heart failure  Acute respiratory failure with hypoxia  Severe aortic stenosis  BREA on CKD  COPD  Illicit drug use : positive for amphetamines      RECOMMENDATIONS:    Diuretics given yesterday IV Lasix 40 mg x 2 dose but no further doses given today.  Will give furosemide IV 40 mg daily.  Appreciate Nephrology input regarding dosing of diuretics.  Consideration for left heart catheterization  with possible SAVR versus TAVR.  Patient is high risk for complications due to COPD, renal disease, and illicit drug use.  Would like to further discuss when he is more awake and alert.  Thank you for the consultation.        Kimber Parrish NP  [facility]  Department of Cardiology  Date of Service: 01/17/2023  4:37 PM \    I have personally interviewed and examined the patient, I have reviewed the Nurse Practitioner's history and physical, assessment, and plan. I have personally evaluated the patient at bedside and agree with the findings and made appropriate changes as necessary in recommendations.      Patient seen examined  Currently resting comfortably without shortness of breath  His decreased breath sounds at the right base  Systolic ejection murmur  Will re-evaluate when more alert    Corwin Fisher MD  Department of Cardiology  [facility]  01/17/2023 4:44 PM

## 2023-01-17 NOTE — PROGRESS NOTES
Nephrology Progress Note        Patient Name: Pam Gray  MRN: [id number]    Patient Class: OP- Observation   Admission Date: 1/16/2023  Length of Stay: 0 days  Date of Service: 1/17/2023    Attending Physician: Luan Liao, *  Primary Care Provider: Jacob Zuniga MD    Reason for Consult: sob/brea/hyperkalemia/hyponatremia/acidosis/chf/anemia/thn    SUBJECTIVE:     HPI: 62M with past medical history of anxiety, arthritis, CHF, CKD stage 3, COPD, CAD, diabetes mellitus, peripheral neuropathy, CVA presented initially on 1/10 to the emergency department at [facility] in [place], [place] via ambulance with complaints of SI and depression. He was sent PECed to [facility] and now brought to [facility] with SOB. Labs show BREA, hyperkalemia. ABG with slight acidosis.    1/17  VSS, K+ at goal.  1.7L UOP recorded.  No complaints.    Past Medical History:   Diagnosis Date    Anxiety     Arthritis     CHF (congestive heart failure)     Chronic kidney disease     COPD (chronic obstructive pulmonary disease)     Coronary artery disease     Diabetes mellitus     Erectile dysfunction     Hypertension     Necrotizing fasciitis of forearm     Peripheral neuropathy     Stroke     TIA    Thyroid disease      Past Surgical History:   Procedure Laterality Date    arm surgery Right     arthroscopy Right     knee    COLONOSCOPY N/A 7/3/2019    Procedure: COLONOSCOPY;  Surgeon: Jagdish Pollock MD;  Location: [facility] ENDO;  Service: Endoscopy;  Laterality: N/A;    ESOPHAGOGASTRODUODENOSCOPY N/A 7/3/2019    Procedure: ESOPHAGOGASTRODUODENOSCOPY (EGD);  Surgeon: Jagdish Pollock MD;  Location: [facility] ENDO;  Service: Endoscopy;  Laterality: N/A;    EYE SURGERY      FUSION, SPINE, POSTERIOR APPROACH N/A 7/29/2022    Procedure: FUSION,SPINE,POSTERIOR APPROACH, T9-L3;  Surgeon: Luis E Lawson MD;  Location: [facility] OR;  Service: Orthopedics;  Laterality: N/A;    ROTATOR CUFF REPAIR Right     TONSILLECTOMY       VERTEBRAL CORPECTOMY N/A 7/27/2022    Procedure: CORPECTOMY T12;  Surgeon: Luis E Lawson MD;  Location: [facility] OR;  Service: Orthopedics;  Laterality: N/A;     Family History   Problem Relation Age of Onset    COPD Mother     Diabetes Father     Cancer Brother      Social History     Tobacco Use    Smoking status: Every Day     Packs/day: 1.00     Years: 40.00     Pack years: 40.00     Types: Cigarettes    Smokeless tobacco: Never   Substance Use Topics    Alcohol use: Not Currently    Drug use: Not Currently       Review of patient's allergies indicates:   Allergen Reactions    Onion     Pork/porcine containing products     Shellfish containing products     Shrimp        Outpatient meds:  No current facility-administered medications on file prior to encounter.     Current Outpatient Medications on File Prior to Encounter   Medication Sig Dispense Refill    albuterol-ipratropium (DUO-NEB) 2.5 mg-0.5 mg/3 mL nebulizer solution Take 3 mLs by nebulization every 6 (six) hours while awake. Rescue 270 mL 0    aspirin 81 MG Chew Take 1 tablet (81 mg total) by mouth once daily. 30 tablet 0    atorvastatin (LIPITOR) 40 MG tablet Take 1 tablet (40 mg total) by mouth once daily. 30 tablet 0    clonazePAM (KLONOPIN) 0.5 MG tablet Take 0.5 mg by mouth 2 (two) times daily as needed for Anxiety.      doxazosin (CARDURA) 2 MG tablet Take 1 tablet (2 mg total) by mouth once daily. 30 tablet 0    fluticasone furoate-vilanteroL (BREO) 100-25 mcg/dose diskus inhaler Inhale 1 puff into the lungs once daily. Controller 30 each 0    fluvoxaMINE (LUVOX) 25 MG tablet Take 1 tablet (25 mg total) by mouth every evening. 90 tablet 1    gabapentin (NEURONTIN) 300 MG capsule Take 1 capsule by mouth once daily.      insulin aspart U-100 (NOVOLOG) 100 unit/mL (3 mL) InPn pen Inject 1-10 Units into the skin before meals and at bedtime as needed (Hyperglycemia).  0    insulin detemir U-100 (LEVEMIR FLEXTOUCH U-100 INSULN) 100 unit/mL (3 mL) InPn  pen Inject 10 Units into the skin every evening.  0    levothyroxine (SYNTHROID) 50 MCG tablet Take 1 tablet (50 mcg total) by mouth once daily.      metoprolol succinate (TOPROL-XL) 25 MG 24 hr tablet Take 1 tablet (25 mg total) by mouth once daily. 30 tablet 0    NIFEdipine (PROCARDIA-XL) 90 MG (OSM) 24 hr tablet Take 1 tablet (90 mg total) by mouth once daily. 30 tablet 0    pantoprazole (PROTONIX) 40 MG tablet Take 1 tablet (40 mg total) by mouth once daily. 30 tablet 0    QUEtiapine (SEROQUEL) 50 MG tablet Take 1 tablet (50 mg total) by mouth 2 (two) times daily. 60 tablet 11    tamsulosin (FLOMAX) 0.4 mg Cap Take 1 capsule (0.4 mg total) by mouth once daily.      tiotropium bromide (SPIRIVA RESPIMAT) 2.5 mcg/actuation inhaler Inhale 2 puffs into the lungs once daily. Controller 4 g 0    [DISCONTINUED] albuterol (PROVENTIL) 2.5 mg /3 mL (0.083 %) nebulizer solution USE 1 VIAL VIA NEBULIZER EVERY 6 HOURS AS NEEDED 7/9/21      [DISCONTINUED] amLODIPine (NORVASC) 10 MG tablet Take 1 tablet (10 mg total) by mouth once daily. 30 tablet 11    [DISCONTINUED] furosemide (LASIX) 80 MG tablet Take 1 tablet (80 mg total) by mouth 2 (two) times daily. 60 tablet 0    [DISCONTINUED] isosorbide-hydrALAZINE 20-37.5 mg (BIDIL) 20-37.5 mg Tab Take 1 tablet by mouth 2 (two) times daily. 60 tablet 0    [DISCONTINUED] metoprolol tartrate (LOPRESSOR) 50 MG tablet Take 1 tablet (50 mg total) by mouth 2 (two) times daily. 60 tablet 11    [DISCONTINUED] mirtazapine (REMERON) 30 MG tablet Take 30 mg by mouth every evening.      [DISCONTINUED] omeprazole (PRILOSEC) 40 MG capsule Take 1 capsule (40 mg total) by mouth once daily. 30 capsule 11    [DISCONTINUED] pioglitazone (ACTOS) 30 MG tablet Take 30 mg by mouth once daily.      [DISCONTINUED] rivastigmine (EXELON) 4.6 mg/24 hour PT24 Place 1 patch onto the skin once daily. 30 patch 11    [DISCONTINUED] SITagliptin (JANUVIA) 100 MG Tab Take 100 mg by mouth once daily.      [DISCONTINUED]  traZODone (DESYREL) 50 MG tablet Take 50 mg by mouth every evening.         Scheduled meds:   arformoteroL  15 mcg Nebulization BID    aspirin  81 mg Oral Daily    atorvastatin  40 mg Oral Daily    budesonide  0.5 mg Nebulization Q12H    doxazosin  2 mg Oral Daily    fluvoxaMINE  25 mg Oral QHS    insulin detemir U-100  5 Units Subcutaneous QHS    ipratropium  0.5 mg Nebulization Q6H    levothyroxine  50 mcg Oral Before breakfast    metoprolol succinate  25 mg Oral Daily    NIFEdipine  90 mg Oral Daily    pantoprazole  40 mg Oral Before breakfast    polyethylene glycol  17 g Oral Daily    QUEtiapine  50 mg Oral BID    tamsulosin  0.4 mg Oral Daily       Infusions:      PRN meds:      Review of Systems:  Constitutional:  Negative for chills, fever, malaise/fatigue and weight loss.   HENT:  Negative for hearing loss and nosebleeds.    Eyes:  Negative for blurred vision, double vision and photophobia.   Respiratory:  Negative for cough, shortness of breath and wheezing.    Cardiovascular:  Negative for chest pain, palpitations and leg swelling.   Gastrointestinal:  Negative for abdominal pain, constipation, diarrhea, heartburn, nausea and vomiting.   Genitourinary:  Negative for dysuria, frequency and urgency.   Musculoskeletal:  Negative for falls, joint pain and myalgias.   Skin:  Negative for itching and rash.   Neurological:  Negative for dizziness, speech change, focal weakness, loss of consciousness and headaches.   Endo/Heme/Allergies:  Does not bruise/bleed easily.   Psychiatric/Behavioral:  Negative for depression and substance abuse. The patient is not nervous/anxious.      OBJECTIVE:     Vital Signs and IO:  Temp:  [98.8 °F (37.1 °C)]   Pulse:  [66-98]   Resp:  [18-24]   BP: (118-151)/(56-87)   SpO2:  [88 %-97 %]   I/O last 3 completed shifts:  In: -   Out: 1700 [Urine:1700]  Wt Readings from Last 5 Encounters:   01/16/23 90.7 kg (200 lb)   01/16/23 90.7 kg (200 lb)   01/10/23 89.4 kg (197 lb)   09/14/22  89.5 kg (197 lb 4.8 oz)   08/25/22 91.2 kg (201 lb)     Body mass index is 29.53 kg/m².    Physical Exam  Constitutional:       General: She is not in acute distress.     Appearance: She is well-developed. She is not diaphoretic.   HENT:      Head: Normocephalic and atraumatic.      Mouth/Throat:      Mouth: Mucous membranes are moist.   Eyes:      General: No scleral icterus.     Pupils: Pupils are equal, round, and reactive to light.   Cardiovascular:      Rate and Rhythm: Normal rate and regular rhythm.   Pulmonary:      Effort: Pulmonary effort is normal. No respiratory distress.      Breath sounds: No stridor.   Abdominal:      General: There is no distension.      Palpations: Abdomen is soft.   Musculoskeletal:         General: No deformity. Normal range of motion.      Cervical back: Neck supple.   Skin:     General: Skin is warm and dry.      Findings: No rash present. No erythema.   Neurological:      Mental Status: She is alert and oriented to person, place, and time.      Cranial Nerves: No cranial nerve deficit.   Psychiatric:         Behavior: Behavior normal.     Laboratory:  Recent Labs   Lab 01/16/23  1250 01/16/23  1832 01/17/23  0504   * 136 136  136   K 5.7* 4.6 4.1  4.1    101 104  103   CO2 21* 23 22*  23   BUN 65* 62* 68*  67*   CREATININE 3.2* 3.1* 3.2*  3.2*   * 135* 97  95         Recent Labs   Lab 01/16/23  1015 01/16/23  1250 01/16/23  1542 01/16/23  1832 01/17/23  0504   CALCIUM 8.4* 8.4*  --  8.8 8.4*  8.4*   ALBUMIN 3.2* 3.2*  --   --  2.9*   PHOS  --  4.6*  --   --   --    MG  --   --  2.6  --   --          Recent Labs   Lab 07/10/21  1030 07/11/21  0625   PTH, Intact 92.2 H 129.7 H         No results for input(s): POCTGLUCOSE in the last 168 hours.    Recent Labs   Lab 07/26/22  0631 09/14/22  1526 01/16/23  1807   Hemoglobin A1C 6.1 H 6.1 H 6.6 H         Recent Labs   Lab 01/16/23  1015 01/16/23  1807   WBC 8.03 7.16   HGB 10.4* 9.7*   HCT 34.0* 31.9*   PLT  166 127*   MCV 79* 78*   MCHC 30.6* 30.4*   MONO 9.7  0.8 9.8  0.7         Recent Labs   Lab 01/16/23  1015 01/16/23  1250 01/17/23  0504   BILITOT 0.7  --  1.1*   PROT 6.5  --  6.0   ALBUMIN 3.2* 3.2* 2.9*   ALKPHOS 63  --  53*   ALT 22  --  16   AST 21  --  15         Recent Labs   Lab 07/25/22  1726 09/14/22  1539 01/10/23  1035 01/16/23  1401   Color, UA Yellow Yellow Yellow Yellow   Appearance, UA Clear Cloudy A Clear Clear   pH, UA 6.0 5.0 6.0 6.0   Specific Jackson, UA 1.020 1.020 >=1.030 A 1.010   Protein, UA 3+ A 2+ A 3+ A 1+ A   Glucose, UA 1+ A Negative Negative Negative   Ketones, UA Negative Negative Negative Negative   Urobilinogen, UA Negative 1.0  --  Negative   Bilirubin (UA) Negative Negative 1+ A Negative   Occult Blood UA Trace A Negative Trace A Negative   Nitrite, UA Negative Negative Negative Negative   RBC, UA 0 4 1 1   WBC, UA 4 3 6 H 14 H   Bacteria Rare Negative None Negative   Hyaline Casts, UA 13 A 3.5 A 1 5 A         Recent Labs   Lab 04/06/22  0953 09/14/22  1550 01/16/23  1047   POC PH 7.365 7.409 7.323 L   POC PCO2 39.9 25.8 L 38.9   POC HCO3 22.3 L 18.5 20.2 L   POC PO2 60.9 L 58.8 LL 60 L   POC SATURATED O2  --  93.9 89 L   POC BE  --   --  -6   Sample  --   --  ARTERIAL         Microbiology Results (last 7 days)       ** No results found for the last 168 hours. **            ASSESSMENT/PLAN:     BREA  Hyperkalemia  Acidosis  Hyponatremia  CKD stage 3, baseline sCr around 2  BPH  No NSAIDs, ACEI/ARB, IV contrast or other nephrotoxins.  Keep MAP > 60, SBP > 100.  Dose meds for GFR < 30 ml/min.  Renal diet - low K, low phos.  Add Lokelma.  Obtain renal US.  Agree with diuretics.  Repeat labs later today.    Anemia of CKD  Hgb and HCT are acceptable. Monitor for now.  Will provide JUANITA and/or IV iron PRN.    HTN  BP seem controlled.   Tolerate asymptomatic HTN up to -160.  Continue home meds.  Low sodium diet.    Thank you for allowing us to participate in the care of your  patient!   We will follow the patient and provide recommendations as needed.    Patient care time was spent personally by me on the following activities:     Obtaining a history.  Examination of patient.  Providing medical care at the patients bedside.  Developing a treatment plan with patient or surrogate and bedside caregivers.  Ordering and reviewing laboratory studies, radiographic studies, pulse oximetry.  Ordering and performing treatments and interventions.  Evaluation of patient's response to treatment.  Discussions with consultants while on the unit and immediately available to the patient.  Re-evaluation of the patient's condition.  Documentation in the medical record.     Rosa Diez NP      [facility]  [address] [place] [address]  [place] [address]    [phone] - tel  [phone] - fax    1/17/2023

## 2023-01-17 NOTE — PLAN OF CARE
[facility] - Emergency Dept  Initial Discharge Assessment       Primary Care Provider: Jacob Zuniga MD    Admission Diagnosis: Congestive heart failure, unspecified HF chronicity, unspecified heart failure type [I50.9]    Admission Date: 2023  Expected Discharge Date: 2023     met with patient at bedside. Patient was transported from [facility], PEC began on 1/10/23 8:17 am and will  on 23 8:17 am.    Discharge Barriers Identified: None    Payor: [employer] MA[employer]GED MEDICARE / Plan: [employer] SECURE COMPLETE / Product Type: Medicare Advantage /     Extended Emergency Contact Information  Primary Emergency Contact: olivia conroy  Mobile Phone: [phone]  Relation: Son  Secondary Emergency Contact: annabella conroy  Mobile Phone: [phone]  Relation: Son    Discharge Plan A: Psychiatric hospital  Discharge Plan B: [id number] - [place], [place] - [address] AT [facility]  [address]  [facility] [address]  Phone: [phone] Fax: [phone]    [facility] [address]  [address] [facility] [address]  Phone: [phone] Fax: [phone]      Initial Assessment (most recent)       Adult Discharge Assessment - 23 1044          Discharge Assessment    Assessment Type Discharge Planning Assessment     Confirmed/corrected address, phone number and insurance Yes     Confirmed Demographics Correct on Facesheet     Source of Information patient     Does patient/caregiver understand observation status Yes     Communicated DIANN with patient/caregiver Date not available/Unable to determine     People in Home facility resident     Facility Arrived From: [facility]     Do you expect to return to your current living situation? Yes   PEC Expires 23 8:17am    Do you have help at home or someone to help you manage your care at home? No      Prior to hospitilization cognitive status: Alert/Oriented     Current cognitive status: Alert/Oriented     Walking or Climbing Stairs ambulation difficulty, requires equipment     Mobility Management walker and wheelchair     Home Accessibility wheelchair accessible     Home Layout Able to live on 1st floor     Equipment Currently Used at Home walker, standard;wheelchair;oxygen     Readmission within 30 days? No     Patient currently being followed by outpatient case management? No     Do you currently have service(s) that help you manage your care at home? No     Do you take prescription medications? Yes     Do you have prescription coverage? Yes     Coverage PHN     Do you have any problems affording any of your prescribed medications? No     Is the patient taking medications as prescribed? yes     Who is going to help you get home at discharge? ADT 30 to be ordered for patient to Discharge back to [facility]     How do you get to doctors appointments? car, drives self     Are you on dialysis? No     Do you take coumadin? No     Discharge Plan A Psychiatric hospital     Discharge Plan B Psychiatric hospital     DME Needed Upon Discharge  none     Discharge Plan discussed with: Patient     Discharge Barriers Identified None

## 2023-01-17 NOTE — PLAN OF CARE
01/17/23 1044   BARRY Message   Medicare Outpatient and Observation Notification regarding financial responsibility Given to patient/caregiver;Explained to patient/caregiver;Signed/date by patient/caregiver   Date BARRY was signed 01/17/23   Time BARRY was signed 1007

## 2023-01-18 LAB
ALBUMIN SERPL BCP-MCNC: 2.8 G/DL (ref 3.5–5.2)
ALP SERPL-CCNC: 61 U/L (ref 55–135)
ALT SERPL W/O P-5'-P-CCNC: 19 U/L (ref 10–44)
ANION GAP SERPL CALC-SCNC: 8 MMOL/L (ref 8–16)
AST SERPL-CCNC: 18 U/L (ref 10–40)
BILIRUB SERPL-MCNC: 0.9 MG/DL (ref 0.1–1)
BUN SERPL-MCNC: 71 MG/DL (ref 8–23)
CALCIUM SERPL-MCNC: 8.6 MG/DL (ref 8.7–10.5)
CHLORIDE SERPL-SCNC: 103 MMOL/L (ref 95–110)
CO2 SERPL-SCNC: 26 MMOL/L (ref 23–29)
CREAT SERPL-MCNC: 3 MG/DL (ref 0.5–1.4)
EST. GFR  (NO RACE VARIABLE): 22.8 ML/MIN/1.73 M^2
GLUCOSE SERPL-MCNC: 122 MG/DL (ref 70–110)
GLUCOSE SERPL-MCNC: 131 MG/DL (ref 70–110)
GLUCOSE SERPL-MCNC: 170 MG/DL (ref 70–110)
GLUCOSE SERPL-MCNC: 172 MG/DL (ref 70–110)
GLUCOSE SERPL-MCNC: 189 MG/DL (ref 70–110)
MAGNESIUM SERPL-MCNC: 2.5 MG/DL (ref 1.6–2.6)
POTASSIUM SERPL-SCNC: 4.6 MMOL/L (ref 3.5–5.1)
PROT SERPL-MCNC: 6.1 G/DL (ref 6–8.4)
SODIUM SERPL-SCNC: 137 MMOL/L (ref 136–145)
TROPONIN I SERPL HS-MCNC: 82.1 PG/ML (ref 0–14.9)

## 2023-01-18 PROCEDURE — 83735 ASSAY OF MAGNESIUM: CPT | Performed by: NURSE PRACTITIONER

## 2023-01-18 PROCEDURE — 36415 COLL VENOUS BLD VENIPUNCTURE: CPT | Performed by: NURSE PRACTITIONER

## 2023-01-18 PROCEDURE — 27000221 HC OXYGEN, UP TO 24 HOURS

## 2023-01-18 PROCEDURE — 84484 ASSAY OF TROPONIN QUANT: CPT | Performed by: NURSE PRACTITIONER

## 2023-01-18 PROCEDURE — 99900031 HC PATIENT EDUCATION (STAT)

## 2023-01-18 PROCEDURE — 99233 PR SUBSEQUENT HOSPITAL CARE,LEVL III: ICD-10-PCS | Mod: ,,, | Performed by: GENERAL PRACTICE

## 2023-01-18 PROCEDURE — 96376 TX/PRO/DX INJ SAME DRUG ADON: CPT

## 2023-01-18 PROCEDURE — 99900035 HC TECH TIME PER 15 MIN (STAT)

## 2023-01-18 PROCEDURE — 25000003 PHARM REV CODE 250: Performed by: NURSE PRACTITIONER

## 2023-01-18 PROCEDURE — 97161 PT EVAL LOW COMPLEX 20 MIN: CPT

## 2023-01-18 PROCEDURE — 94761 N-INVAS EAR/PLS OXIMETRY MLT: CPT

## 2023-01-18 PROCEDURE — 99233 SBSQ HOSP IP/OBS HIGH 50: CPT | Mod: ,,, | Performed by: GENERAL PRACTICE

## 2023-01-18 PROCEDURE — G0378 HOSPITAL OBSERVATION PER HR: HCPCS

## 2023-01-18 PROCEDURE — 25000003 PHARM REV CODE 250: Performed by: STUDENT IN AN ORGANIZED HEALTH CARE EDUCATION/TRAINING PROGRAM

## 2023-01-18 PROCEDURE — 63600175 PHARM REV CODE 636 W HCPCS: Performed by: NURSE PRACTITIONER

## 2023-01-18 PROCEDURE — 80053 COMPREHEN METABOLIC PANEL: CPT | Performed by: NURSE PRACTITIONER

## 2023-01-18 RX ORDER — ARFORMOTEROL TARTRATE 15 UG/2ML
15 SOLUTION RESPIRATORY (INHALATION) 2 TIMES DAILY
Status: DISCONTINUED | OUTPATIENT
Start: 2023-01-18 | End: 2023-01-22

## 2023-01-18 RX ORDER — BUDESONIDE 0.5 MG/2ML
0.5 INHALANT ORAL EVERY 12 HOURS
Status: DISCONTINUED | OUTPATIENT
Start: 2023-01-18 | End: 2023-01-22

## 2023-01-18 RX ORDER — IPRATROPIUM BROMIDE 0.5 MG/2.5ML
0.5 SOLUTION RESPIRATORY (INHALATION) EVERY 6 HOURS
Status: DISCONTINUED | OUTPATIENT
Start: 2023-01-18 | End: 2023-01-19

## 2023-01-18 RX ORDER — DOCUSATE SODIUM 100 MG/1
100 CAPSULE, LIQUID FILLED ORAL DAILY
Status: DISCONTINUED | OUTPATIENT
Start: 2023-01-18 | End: 2023-01-24 | Stop reason: HOSPADM

## 2023-01-18 RX ORDER — FLUTICASONE FUROATE AND VILANTEROL 100; 25 UG/1; UG/1
1 POWDER RESPIRATORY (INHALATION) DAILY
Status: DISCONTINUED | OUTPATIENT
Start: 2023-01-18 | End: 2023-01-18

## 2023-01-18 RX ADMIN — INSULIN DETEMIR 5 UNITS: 100 INJECTION, SOLUTION SUBCUTANEOUS at 08:01

## 2023-01-18 RX ADMIN — POLYETHYLENE GLYCOL 3350 17 G: 17 POWDER, FOR SOLUTION ORAL at 09:01

## 2023-01-18 RX ADMIN — PANTOPRAZOLE SODIUM 40 MG: 40 TABLET, DELAYED RELEASE ORAL at 05:01

## 2023-01-18 RX ADMIN — DOCUSATE SODIUM 100 MG: 100 CAPSULE, LIQUID FILLED ORAL at 09:01

## 2023-01-18 RX ADMIN — FUROSEMIDE 40 MG: 10 INJECTION, SOLUTION INTRAMUSCULAR; INTRAVENOUS at 09:01

## 2023-01-18 RX ADMIN — QUETIAPINE 50 MG: 25 TABLET ORAL at 09:01

## 2023-01-18 RX ADMIN — CLONAZEPAM 0.5 MG: 0.5 TABLET ORAL at 11:01

## 2023-01-18 RX ADMIN — ASPIRIN 81 MG CHEWABLE TABLET 81 MG: 81 TABLET CHEWABLE at 09:01

## 2023-01-18 RX ADMIN — ATORVASTATIN CALCIUM 40 MG: 40 TABLET, FILM COATED ORAL at 09:01

## 2023-01-18 RX ADMIN — DOXAZOSIN 2 MG: 1 TABLET ORAL at 09:01

## 2023-01-18 RX ADMIN — NIFEDIPINE 90 MG: 30 TABLET, FILM COATED, EXTENDED RELEASE ORAL at 09:01

## 2023-01-18 RX ADMIN — QUETIAPINE 50 MG: 25 TABLET ORAL at 08:01

## 2023-01-18 RX ADMIN — LEVOTHYROXINE SODIUM 50 MCG: 0.03 TABLET ORAL at 05:01

## 2023-01-18 RX ADMIN — METOPROLOL SUCCINATE 25 MG: 25 TABLET, EXTENDED RELEASE ORAL at 09:01

## 2023-01-18 RX ADMIN — TAMSULOSIN HYDROCHLORIDE 0.4 MG: 0.4 CAPSULE ORAL at 09:01

## 2023-01-18 NOTE — PROGRESS NOTES
[facility] Medicine  Progress Note    Patient name: Pam Gray  MRN: [id number]  Admit Date: 1/16/2023   LOS: 0 days     SUBJECTIVE:     Principal problem: Acute on chronic combined systolic and diastolic CHF, NYHA class 1    Interval History:      1/17- Patient is currently roomed in hallway, denies chest pain.  Shortness of breath has improved.  Was at [facility] without oxygen, baseline req is 2LNC.  Difficult historian.  Follow up cards recs.     Scheduled Meds:   aspirin  81 mg Oral Daily    atorvastatin  40 mg Oral Daily    doxazosin  2 mg Oral Daily    fluvoxaMINE  25 mg Oral QHS    furosemide (LASIX) injection  40 mg Intravenous Daily    insulin detemir U-100  5 Units Subcutaneous QHS    levothyroxine  50 mcg Oral Before breakfast    metoprolol succinate  25 mg Oral Daily    NIFEdipine  90 mg Oral Daily    pantoprazole  40 mg Oral Before breakfast    polyethylene glycol  17 g Oral Daily    QUEtiapine  50 mg Oral BID    tamsulosin  0.4 mg Oral Daily     Continuous Infusions:  PRN Meds:clonazePAM, dextrose 10%, dextrose 10%, glucagon (human recombinant), glucose, glucose, insulin aspart U-100, ipratropium, naloxone, sodium chloride 0.9%    Review of patient's allergies indicates:   Allergen Reactions    Onion Other (See Comments)     THROAT SWELLS    Pork/porcine containing products      Gnosticism PREFERENCE    Shellfish containing products      Gnosticism PREFERENCE      Shrimp      Gnosticism PREFERENCE         Review of Systems: As per interval history    OBJECTIVE:     Vital Signs (Most Recent)  Temp: 97.9 °F (36.6 °C) (01/17/23 2000)  Pulse: 62 (01/18/23 0600)  Resp: 17 (01/18/23 0600)  BP: (!) 111/55 (01/18/23 0600)  SpO2: (!) 93 % (01/18/23 0600)    Vital Signs Range (Last 24H):  Temp:  [97.7 °F (36.5 °C)-98.5 °F (36.9 °C)]   Pulse:  [58-95]   Resp:  [16-34]   BP: ()/(50-64)   SpO2:  [72 %-100 %]     I & O (Last 24H):  Intake/Output Summary (Last 24 hours) at 1/18/2023  0748  Last data filed at 1/18/2023 0742  Gross per 24 hour   Intake 740 ml   Output 1150 ml   Net -410 ml       Physical Exam:  General: Patient resting eyes closed, arouses easily  Eyes: No conjunctival injection. No scleral icterus.  ENT: Hearing grossly intact. No discharge from ears. No nasal discharge.   CVS: RRR. trace LE edema BL  Lungs:  No tachypnea or accessory muscle use.  Clear to auscultation bilaterally  Abdomen:  Soft, nontender and nondistended.  No organomegaly  Neuro: Alert. Speech is slow and slurred. Moves all extremities. Follows commands.       Laboratory:  I have reviewed all pertinent lab results within the past 24 hours.    Diagnostic Results:  Labs: Reviewed  ECG: Reviewed  X-Ray: Reviewed  Echo: Reviewed    ASSESSMENT/PLAN:     Shortness of breath  COPD  HFpEF  Severe aortic stenosis  Restart home controllers for copd  Duonebs prn  Continue IV lasix 40 mg daily  Echo w/EF 60%, suspect some degree of diastolic dysfx as well as severe AS  Cardiology consulted  Considering possible SAVR vs TAVR, but would be fairly high risk    BREA/CKD  Hyperkalemia   Added lokelma  Nephro consulted  Diuresing with lasix IV  Creat slightly improved 3.2 >3    Dispo  Plan is back to [facility] when medically appropriate     Active Hospital Problems    Diagnosis  POA    *Acute on chronic combined systolic and diastolic CHF, NYHA class 1 [I50.43]  Yes    Hyponatremia [E87.1]  Yes    Acute respiratory failure with hypoxia [J96.01]  Yes    Type 2 diabetes mellitus with neurologic complication, with long-term current use of insulin [E11.49, Z79.4]  Not Applicable    Steroid-induced hyperglycemia [R73.9, T38.0X5A]  Yes    MDD (major depressive disorder), recurrent episode, moderate [F33.1]  Yes    Microcytic anemia [D50.9]  Yes    Vascular dementia with delirium [F01.50, F05]  Yes    Methamphetamine / cocaine dependence [F15.20]  Yes    Hypothyroid [E03.9]  Yes    CAD (coronary artery disease) [I25.10]  Yes     Hyperkalemia [E87.5]  Yes    Stage 3 severe COPD by GOLD classification [J44.9]  Yes    Hypertension [I10]  Yes    AYDEE on CPAP [G47.33, Z99.89]  Not Applicable      Resolved Hospital Problems   No resolved problems to display.               VTE Risk Mitigation (From admission, onward)           Ordered     Reason for No Pharmacological VTE Prophylaxis  Once        Question:  Reasons:  Answer:  Physician Provided (leave comment)    01/16/23 1353     Place RADHA hose  Until discontinued         01/16/23 1353     IP VTE HIGH RISK PATIENT  Once         01/16/23 1353     Place sequential compression device  Until discontinued         01/16/23 1353                        Department Hospital Medicine  [facility]  Luan Liao MD

## 2023-01-18 NOTE — CARE UPDATE
01/18/23 0756   Patient Assessment/Suction   Level of Consciousness (AVPU) alert   Respiratory Effort Normal;Unlabored   Expansion/Accessory Muscles/Retractions no use of accessory muscles;no retractions;expansion symmetric   All Lung Fields Breath Sounds clear   Rhythm/Pattern, Respiratory unlabored;pattern regular;depth regular;chest wiggle adequate;no shortness of breath reported   Cough Frequency infrequent   Cough Type good   PRE-TX-O2   Device (Oxygen Therapy) nasal cannula   $ Is the patient on Low Flow Oxygen? Yes   Flow (L/min) 3   SpO2 96 %   Pulse Oximetry Type Continuous   $ Pulse Oximetry - Multiple Charge Pulse Oximetry - Multiple   Pulse 60   Resp (!) 25   Aerosol Therapy   $ Aerosol Therapy Charges PRN treatment not required   Education   $ Education Bronchodilator;15 min   Respiratory Evaluation   $ Care Plan Tech Time 15 min

## 2023-01-18 NOTE — CARE UPDATE
01/17/23 2034   PRE-TX-O2   Device (Oxygen Therapy) nasal cannula   $ Is the patient on Low Flow Oxygen? Yes   Flow (L/min) 4   Pulse Oximetry Type Continuous   $ Pulse Oximetry - Multiple Charge Pulse Oximetry - Multiple   Aerosol Therapy   $ Aerosol Therapy Charges Refused   Respiratory Evaluation   $ Care Plan Tech Time 15 min

## 2023-01-18 NOTE — PLAN OF CARE
CM received message from Angie, patient's  with [facility].  She wanted to give us some background on the patient that she was unaware if we knew about.  She stated the patient does have 2 sons who will answer the phone but are unable to provide any support due to a downfall in their relationship.  Patient has no home or family support.  He does have electricity but no running water in the majority of the home.  She was aware the patient was brought here from [place] and wanted to know what the plan was for discharge.  CM stated current plan at this time is to return to [place].

## 2023-01-18 NOTE — PT/OT/SLP EVAL
"Physical Therapy Evaluation    Patient Name:  Pam Gray   MRN:  [id number]    Recommendations:     Discharge Recommendations: home health PT   Discharge Equipment Recommendations: na    Barriers to discharge: Decreased caregiver support, psych history    Assessment:     Pam Gray is a 62 y.o. male admitted with a medical diagnosis of Acute on chronic combined systolic and diastolic CHF, NYHA class 1.  He presents with the following impairments/functional limitations: weakness, impaired endurance, impaired self care skills, impaired functional mobility, gait instability, impaired balance, impaired cognition, decreased lower extremity function, decreased safety awareness, abnormal tone. Patient able to ambulate limited distance but has significant ataxia with dec safety awareness    Rehab Prognosis: Fair; patient would benefit from acute skilled PT services to address these deficits and reach maximum level of function.    Recent Surgery: * No surgery found *      Plan:     During this hospitalization, patient to be seen 5 x/week to address the identified rehab impairments via gait training, therapeutic activities, therapeutic exercises and progress toward the following goals:    Plan of Care Expires:   2/18/23    Subjective     Chief Complaint: c/o the food being terrible  Patient/Family Comments/goals: "get out of here"  Pain/Comfort:  Pain Rating 1: 0/10    Patients cultural, spiritual, [employer] conflicts given the current situation: no    Living Environment:  Pt transferred from [facility].Poor historian  Prior to admission, patients level of function was mod I per pt.  Equipment used at home: walker, standard, wheelchair, oxygen.  DME owned (not currently used): none.  Upon discharge, patient will have assistance from facility.    Objective:     Communicated with nurse prior to session.  Patient found supine with telemetry, oxygen, pulse ox (continuous)  upon PT entry to " room.    General Precautions: Standard,    Orthopedic Precautions:N/A   Braces: N/A  Respiratory Status: Nasal cannula, flow 4 L/min    Exams:  Cognitive Exam:  Patient is oriented to Person and Place  Sensation:    -       Impaired  light/touch reports numbness from the ankle down  RLE Strength: 3/5  LLE Strength: 3/5    Functional Mobility:  Bed Mobility:     Supine to Sit: stand by assistance  Transfers:     Sit to Stand:  minimum assistance with rolling walker  Gait: 10 ft with rw and min A. Ataxia in B LE's. Assistance to move walker   Balance: static balance with rw good      AM-PAC 6 CLICK MOBILITY  Total Score:18       Treatment & Education:  Ambulated in room with rw. Sitter present. Educated on the importance of OOB to chair     Patient left up in chair with all lines intact, call button in reach, and sitter present.    GOALS:   Multidisciplinary Problems       Physical Therapy Goals          Problem: Physical Therapy    Goal Priority Disciplines Outcome Goal Variances Interventions   Physical Therapy Goal     PT, PT/OT      Description: Goals to be met by:      Patient will increase functional independence with mobility by performin. Sit to stand transfer with Stand-by Assistance  2. Bed to chair transfer with Contact Guard Assistance using Rolling Walker  3. Gait  x 50 feet with Minimal Assistance using Rolling Walker.                          History:     Past Medical History:   Diagnosis Date    Anxiety     Arthritis     CHF (congestive heart failure)     Chronic kidney disease     COPD (chronic obstructive pulmonary disease)     Coronary artery disease     Diabetes mellitus     Erectile dysfunction     Hypertension     Necrotizing fasciitis of forearm     Peripheral neuropathy     Stroke     TIA    Thyroid disease        Past Surgical History:   Procedure Laterality Date    arm surgery Right     arthroscopy Right     knee    COLONOSCOPY N/A 7/3/2019    Procedure: COLONOSCOPY;  Surgeon:  Jagdish Pollock MD;  Location: [facility];  Service: Endoscopy;  Laterality: N/A;    ESOPHAGOGASTRODUODENOSCOPY N/A 7/3/2019    Procedure: ESOPHAGOGASTRODUODENOSCOPY (EGD);  Surgeon: Jagdish Pollock MD;  Location: [facility] ENDO;  Service: Endoscopy;  Laterality: N/A;    EYE SURGERY      FUSION, SPINE, POSTERIOR APPROACH N/A 7/29/2022    Procedure: FUSION,SPINE,POSTERIOR APPROACH, T9-L3;  Surgeon: Luis E Lawson MD;  Location: [facility] OR;  Service: Orthopedics;  Laterality: N/A;    ROTATOR CUFF REPAIR Right     TONSILLECTOMY      VERTEBRAL CORPECTOMY N/A 7/27/2022    Procedure: CORPECTOMY T12;  Surgeon: Luis E Lawson MD;  Location: [facility] OR;  Service: Orthopedics;  Laterality: N/A;       Time Tracking:     PT Received On: 01/18/23  PT Start Time: 1009     PT Stop Time: 1030  PT Total Time (min): 21 min     Billable Minutes: Evaluation 21 min      01/18/2023

## 2023-01-18 NOTE — PROGRESS NOTES
Nephrology Progress Note        Patient Name: Pam Gray  MRN: [id number]    Patient Class: OP- Observation   Admission Date: 1/16/2023  Length of Stay: 0 days  Date of Service: 1/18/2023    Attending Physician: Luan Liao, *  Primary Care Provider: Jacob Zuniga MD    Reason for Consult: sob/brea/hyperkalemia/hyponatremia/acidosis/chf/anemia/thn    SUBJECTIVE:     HPI: 62M with past medical history of anxiety, arthritis, CHF, CKD stage 3, COPD, CAD, diabetes mellitus, peripheral neuropathy, CVA presented initially on 1/10 to the emergency department at [facility] in [place], [place] via ambulance with complaints of SI and depression. He was sent PECed to [facility] and now brought to [facility] with SOB. Labs show BREA, hyperkalemia. ABG with slight acidosis.    1/17  VSS, K+ at goal.  1.7L UOP recorded.  No complaints.  1/18 VSS. Appreciate cards recommendations and agree with plan.    Past Medical History:   Diagnosis Date    Anxiety     Arthritis     CHF (congestive heart failure)     Chronic kidney disease     COPD (chronic obstructive pulmonary disease)     Coronary artery disease     Diabetes mellitus     Erectile dysfunction     Hypertension     Necrotizing fasciitis of forearm     Peripheral neuropathy     Stroke     TIA    Thyroid disease      Past Surgical History:   Procedure Laterality Date    arm surgery Right     arthroscopy Right     knee    COLONOSCOPY N/A 7/3/2019    Procedure: COLONOSCOPY;  Surgeon: Jagdish Pollock MD;  Location: [facility] ENDO;  Service: Endoscopy;  Laterality: N/A;    ESOPHAGOGASTRODUODENOSCOPY N/A 7/3/2019    Procedure: ESOPHAGOGASTRODUODENOSCOPY (EGD);  Surgeon: Jagdish Pollock MD;  Location: [facility] ENDO;  Service: Endoscopy;  Laterality: N/A;    EYE SURGERY      FUSION, SPINE, POSTERIOR APPROACH N/A 7/29/2022    Procedure: FUSION,SPINE,POSTERIOR APPROACH, T9-L3;  Surgeon: Luis E Lawson MD;  Location: [facility] OR;  Service: Orthopedics;  Laterality:  N/A;    ROTATOR CUFF REPAIR Right     TONSILLECTOMY      VERTEBRAL CORPECTOMY N/A 7/27/2022    Procedure: CORPECTOMY T12;  Surgeon: Luis E Lawson MD;  Location: [facility] OR;  Service: Orthopedics;  Laterality: N/A;     Family History   Problem Relation Age of Onset    COPD Mother     Diabetes Father     Cancer Brother      Social History     Tobacco Use    Smoking status: Every Day     Packs/day: 1.00     Years: 40.00     Pack years: 40.00     Types: Cigarettes    Smokeless tobacco: Never   Substance Use Topics    Alcohol use: Not Currently    Drug use: Not Currently       Review of patient's allergies indicates:   Allergen Reactions    Onion Other (See Comments)     THROAT SWELLS    Pork/porcine containing products      Quaker PREFERENCE    Shellfish containing products      Quaker PREFERENCE      Shrimp      Quaker PREFERENCE         Outpatient meds:  No current facility-administered medications on file prior to encounter.     Current Outpatient Medications on File Prior to Encounter   Medication Sig Dispense Refill    albuterol-ipratropium (DUO-NEB) 2.5 mg-0.5 mg/3 mL nebulizer solution Take 3 mLs by nebulization every 6 (six) hours while awake. Rescue 270 mL 0    aspirin 81 MG Chew Take 1 tablet (81 mg total) by mouth once daily. 30 tablet 0    atorvastatin (LIPITOR) 40 MG tablet Take 1 tablet (40 mg total) by mouth once daily. 30 tablet 0    clonazePAM (KLONOPIN) 0.5 MG tablet Take 0.5 mg by mouth 2 (two) times daily as needed for Anxiety.      doxazosin (CARDURA) 2 MG tablet Take 1 tablet (2 mg total) by mouth once daily. 30 tablet 0    fluticasone furoate-vilanteroL (BREO) 100-25 mcg/dose diskus inhaler Inhale 1 puff into the lungs once daily. Controller 30 each 0    fluvoxaMINE (LUVOX) 25 MG tablet Take 1 tablet (25 mg total) by mouth every evening. 90 tablet 1    gabapentin (NEURONTIN) 300 MG capsule Take 1 capsule by mouth once daily.      insulin aspart U-100 (NOVOLOG) 100 unit/mL (3 mL) InPn pen  Inject 1-10 Units into the skin before meals and at bedtime as needed (Hyperglycemia).  0    insulin detemir U-100 (LEVEMIR FLEXTOUCH U-100 INSULN) 100 unit/mL (3 mL) InPn pen Inject 10 Units into the skin every evening.  0    levothyroxine (SYNTHROID) 50 MCG tablet Take 1 tablet (50 mcg total) by mouth once daily.      metoprolol succinate (TOPROL-XL) 25 MG 24 hr tablet Take 1 tablet (25 mg total) by mouth once daily. 30 tablet 0    NIFEdipine (PROCARDIA-XL) 90 MG (OSM) 24 hr tablet Take 1 tablet (90 mg total) by mouth once daily. 30 tablet 0    pantoprazole (PROTONIX) 40 MG tablet Take 1 tablet (40 mg total) by mouth once daily. 30 tablet 0    QUEtiapine (SEROQUEL) 50 MG tablet Take 1 tablet (50 mg total) by mouth 2 (two) times daily. 60 tablet 11    tamsulosin (FLOMAX) 0.4 mg Cap Take 1 capsule (0.4 mg total) by mouth once daily.      tiotropium bromide (SPIRIVA RESPIMAT) 2.5 mcg/actuation inhaler Inhale 2 puffs into the lungs once daily. Controller 4 g 0    [DISCONTINUED] albuterol (PROVENTIL) 2.5 mg /3 mL (0.083 %) nebulizer solution USE 1 VIAL VIA NEBULIZER EVERY 6 HOURS AS NEEDED 7/9/21      [DISCONTINUED] amLODIPine (NORVASC) 10 MG tablet Take 1 tablet (10 mg total) by mouth once daily. 30 tablet 11    [DISCONTINUED] furosemide (LASIX) 80 MG tablet Take 1 tablet (80 mg total) by mouth 2 (two) times daily. 60 tablet 0    [DISCONTINUED] isosorbide-hydrALAZINE 20-37.5 mg (BIDIL) 20-37.5 mg Tab Take 1 tablet by mouth 2 (two) times daily. 60 tablet 0    [DISCONTINUED] metoprolol tartrate (LOPRESSOR) 50 MG tablet Take 1 tablet (50 mg total) by mouth 2 (two) times daily. 60 tablet 11    [DISCONTINUED] mirtazapine (REMERON) 30 MG tablet Take 30 mg by mouth every evening.      [DISCONTINUED] omeprazole (PRILOSEC) 40 MG capsule Take 1 capsule (40 mg total) by mouth once daily. 30 capsule 11    [DISCONTINUED] pioglitazone (ACTOS) 30 MG tablet Take 30 mg by mouth once daily.      [DISCONTINUED] rivastigmine (EXELON) 4.6  mg/24 hour PT24 Place 1 patch onto the skin once daily. 30 patch 11    [DISCONTINUED] SITagliptin (JANUVIA) 100 MG Tab Take 100 mg by mouth once daily.      [DISCONTINUED] traZODone (DESYREL) 50 MG tablet Take 50 mg by mouth every evening.         Scheduled meds:   arformoteroL  15 mcg Nebulization BID    aspirin  81 mg Oral Daily    atorvastatin  40 mg Oral Daily    budesonide  0.5 mg Nebulization Q12H    docusate sodium  100 mg Oral Daily    doxazosin  2 mg Oral Daily    fluvoxaMINE  25 mg Oral QHS    furosemide (LASIX) injection  40 mg Intravenous Daily    insulin detemir U-100  5 Units Subcutaneous QHS    ipratropium  0.5 mg Nebulization Q6H    levothyroxine  50 mcg Oral Before breakfast    metoprolol succinate  25 mg Oral Daily    NIFEdipine  90 mg Oral Daily    pantoprazole  40 mg Oral Before breakfast    polyethylene glycol  17 g Oral Daily    QUEtiapine  50 mg Oral BID    tamsulosin  0.4 mg Oral Daily       Infusions:      PRN meds:      Review of Systems:  Constitutional:  Negative for chills, fever, malaise/fatigue and weight loss.   HENT:  Negative for hearing loss and nosebleeds.    Eyes:  Negative for blurred vision, double vision and photophobia.   Respiratory:  Negative for cough, shortness of breath and wheezing.    Cardiovascular:  Negative for chest pain, palpitations and leg swelling.   Gastrointestinal:  Negative for abdominal pain, constipation, diarrhea, heartburn, nausea and vomiting.   Genitourinary:  Negative for dysuria, frequency and urgency.   Musculoskeletal:  Negative for falls, joint pain and myalgias.   Skin:  Negative for itching and rash.   Neurological:  Negative for dizziness, speech change, focal weakness, loss of consciousness and headaches.   Endo/Heme/Allergies:  Does not bruise/bleed easily.   Psychiatric/Behavioral:  Negative for depression and substance abuse. The patient is not nervous/anxious.      OBJECTIVE:     Vital Signs and IO:  Temp:  [97.7 °F (36.5 °C)-98.5 °F  (36.9 °C)]   Pulse:  [58-95]   Resp:  [16-34]   BP: ()/(50-64)   SpO2:  [72 %-100 %]   I/O last 3 completed shifts:  In: 740 [P.O.:740]  Out: 2500 [Urine:2500]  Wt Readings from Last 5 Encounters:   01/17/23 84.1 kg (185 lb 8 oz)   01/16/23 90.7 kg (200 lb)   01/10/23 89.4 kg (197 lb)   09/14/22 89.5 kg (197 lb 4.8 oz)   08/25/22 91.2 kg (201 lb)     Body mass index is 27.39 kg/m².    Physical Exam  Constitutional:       General: She is not in acute distress.     Appearance: She is well-developed. She is not diaphoretic.   HENT:      Head: Normocephalic and atraumatic.      Mouth/Throat:      Mouth: Mucous membranes are moist.   Eyes:      General: No scleral icterus.     Pupils: Pupils are equal, round, and reactive to light.   Cardiovascular:      Rate and Rhythm: Normal rate and regular rhythm.   Pulmonary:      Effort: Pulmonary effort is normal. No respiratory distress.      Breath sounds: No stridor.   Abdominal:      General: There is no distension.      Palpations: Abdomen is soft.   Musculoskeletal:         General: No deformity. Normal range of motion.      Cervical back: Neck supple.   Skin:     General: Skin is warm and dry.      Findings: No rash present. No erythema.   Neurological:      Mental Status: She is alert and oriented to person, place, and time.      Cranial Nerves: No cranial nerve deficit.   Psychiatric:         Behavior: Behavior normal.     Laboratory:  Recent Labs   Lab 01/16/23  1832 01/17/23  0504 01/18/23  0421    136  136 137   K 4.6 4.1  4.1 4.6    104  103 103   CO2 23 22*  23 26   BUN 62* 68*  67* 71*   CREATININE 3.1* 3.2*  3.2* 3.0*   * 97  95 131*         Recent Labs   Lab 01/16/23  1250 01/16/23  1542 01/16/23  1832 01/17/23  0504 01/18/23  0421   CALCIUM 8.4*  --  8.8 8.4*  8.4* 8.6*   ALBUMIN 3.2*  --   --  2.9* 2.8*   PHOS 4.6*  --   --   --   --    MG  --  2.6  --   --  2.5         Recent Labs   Lab 07/10/21  1030 07/11/21  0625   PTH,  Intact 92.2 H 129.7 H         No results for input(s): POCTGLUCOSE in the last 168 hours.    Recent Labs   Lab 07/26/22  0631 09/14/22  1526 01/16/23  1807   Hemoglobin A1C 6.1 H 6.1 H 6.6 H         Recent Labs   Lab 01/16/23  1015 01/16/23  1807   WBC 8.03 7.16   HGB 10.4* 9.7*   HCT 34.0* 31.9*    127*   MCV 79* 78*   MCHC 30.6* 30.4*   MONO 9.7  0.8 9.8  0.7         Recent Labs   Lab 01/16/23  1015 01/16/23  1250 01/17/23  0504 01/18/23  0421   BILITOT 0.7  --  1.1* 0.9   PROT 6.5  --  6.0 6.1   ALBUMIN 3.2* 3.2* 2.9* 2.8*   ALKPHOS 63  --  53* 61   ALT 22  --  16 19   AST 21  --  15 18         Recent Labs   Lab 07/25/22  1726 09/14/22  1539 01/10/23  1035 01/16/23  1401   Color, UA Yellow Yellow Yellow Yellow   Appearance, UA Clear Cloudy A Clear Clear   pH, UA 6.0 5.0 6.0 6.0   Specific Flanagan, UA 1.020 1.020 >=1.030 A 1.010   Protein, UA 3+ A 2+ A 3+ A 1+ A   Glucose, UA 1+ A Negative Negative Negative   Ketones, UA Negative Negative Negative Negative   Urobilinogen, UA Negative 1.0  --  Negative   Bilirubin (UA) Negative Negative 1+ A Negative   Occult Blood UA Trace A Negative Trace A Negative   Nitrite, UA Negative Negative Negative Negative   RBC, UA 0 4 1 1   WBC, UA 4 3 6 H 14 H   Bacteria Rare Negative None Negative   Hyaline Casts, UA 13 A 3.5 A 1 5 A         Recent Labs   Lab 04/06/22  0953 09/14/22  1550 01/16/23  1047   POC PH 7.365 7.409 7.323 L   POC PCO2 39.9 25.8 L 38.9   POC HCO3 22.3 L 18.5 20.2 L   POC PO2 60.9 L 58.8 LL 60 L   POC SATURATED O2  --  93.9 89 L   POC BE  --   --  -6   Sample  --   --  ARTERIAL         Microbiology Results (last 7 days)       ** No results found for the last 168 hours. **            ASSESSMENT/PLAN:     BREA  CKD stage 3, baseline sCr around 2  BPH  No NSAIDs, ACEI/ARB, IV contrast or other nephrotoxins.  Keep MAP > 60, SBP > 100.  Dose meds for GFR < 30 ml/min.  Renal diet - low K, low phos.  Renal US is not obstructive, though bladder is  distended.  Agree with diuretics.  Appreciate cards input.    Anemia of CKD  Hgb and HCT are acceptable. Monitor for now.  Will provide JUANITA and/or IV iron PRN.    HTN  BP seem controlled.   Tolerate asymptomatic HTN up to -160.  Continue home meds.  Low sodium diet.    Thank you for allowing us to participate in the care of your patient!   We will follow the patient and provide recommendations as needed.    Patient care time was spent personally by me on the following activities:     Obtaining a history.  Examination of patient.  Providing medical care at the patients bedside.  Developing a treatment plan with patient or surrogate and bedside caregivers.  Ordering and reviewing laboratory studies, radiographic studies, pulse oximetry.  Ordering and performing treatments and interventions.  Evaluation of patient's response to treatment.  Discussions with consultants while on the unit and immediately available to the patient.  Re-evaluation of the patient's condition.  Documentation in the medical record.     Dave Harmon MD      [facility]  [address] [place] [address]    [phone] - tel  [phone] - fax    1/18/2023

## 2023-01-18 NOTE — PLAN OF CARE
Problem: Violence Risk or Actual  Goal: Anger and Impulse Control  Outcome: Ongoing, Progressing     Problem: Adult Inpatient Plan of Care  Goal: Plan of Care Review  Outcome: Ongoing, Progressing  Goal: Patient-Specific Goal (Individualized)  Outcome: Ongoing, Progressing  Goal: Absence of Hospital-Acquired Illness or Injury  Outcome: Ongoing, Progressing  Goal: Optimal Comfort and Wellbeing  Outcome: Ongoing, Progressing  Goal: Readiness for Transition of Care  Outcome: Ongoing, Progressing     Problem: Diabetes Comorbidity  Goal: Blood Glucose Level Within Targeted Range  Outcome: Ongoing, Progressing     Problem: Fluid Imbalance (Pneumonia)  Goal: Fluid Balance  Outcome: Ongoing, Progressing     Problem: Infection (Pneumonia)  Goal: Resolution of Infection Signs and Symptoms  Outcome: Ongoing, Progressing     Problem: Respiratory Compromise (Pneumonia)  Goal: Effective Oxygenation and Ventilation  Outcome: Ongoing, Progressing     Problem: Skin Injury Risk Increased  Goal: Skin Health and Integrity  Outcome: Ongoing, Progressing

## 2023-01-18 NOTE — PROGRESS NOTES
Automatic Inhaler to Nebulizer Interchange    fluticasone/vilanterol (Breo Ellipta) 100 mcg/25 mcg changed to budesonide 0.5 mg twice daily AND arformoterol 15 mcg twice daily per [facility] Automatic Therapeutic Substitutions Protocol.    Please contact pharmacy at extension [phone] with any questions.     Thank you,   Deacon Lee

## 2023-01-18 NOTE — PLAN OF CARE
Problem: Violence Risk or Actual  Goal: Anger and Impulse Control  Outcome: Ongoing, Progressing     Problem: Adult Inpatient Plan of Care  Goal: Plan of Care Review  Outcome: Ongoing, Progressing  Goal: Optimal Comfort and Wellbeing  Outcome: Ongoing, Progressing     Problem: Diabetes Comorbidity  Goal: Blood Glucose Level Within Targeted Range  Outcome: Ongoing, Progressing     Problem: Fluid Imbalance (Pneumonia)  Goal: Fluid Balance  Outcome: Ongoing, Progressing     Problem: Infection (Pneumonia)  Goal: Resolution of Infection Signs and Symptoms  Outcome: Ongoing, Progressing     Problem: Respiratory Compromise (Pneumonia)  Goal: Effective Oxygenation and Ventilation  Outcome: Ongoing, Progressing     Problem: Skin Injury Risk Increased  Goal: Skin Health and Integrity  Outcome: Ongoing, Progressing      EMT/paramedic

## 2023-01-19 LAB
ALBUMIN SERPL BCP-MCNC: 2.8 G/DL (ref 3.5–5.2)
ALP SERPL-CCNC: 68 U/L (ref 55–135)
ALT SERPL W/O P-5'-P-CCNC: 23 U/L (ref 10–44)
ANION GAP SERPL CALC-SCNC: 6 MMOL/L (ref 8–16)
AST SERPL-CCNC: 27 U/L (ref 10–40)
BILIRUB SERPL-MCNC: 0.8 MG/DL (ref 0.1–1)
BUN SERPL-MCNC: 68 MG/DL (ref 8–23)
CALCIUM SERPL-MCNC: 8.3 MG/DL (ref 8.7–10.5)
CHLORIDE SERPL-SCNC: 102 MMOL/L (ref 95–110)
CO2 SERPL-SCNC: 26 MMOL/L (ref 23–29)
CREAT SERPL-MCNC: 2.9 MG/DL (ref 0.5–1.4)
EST. GFR  (NO RACE VARIABLE): 23.7 ML/MIN/1.73 M^2
GLUCOSE SERPL-MCNC: 122 MG/DL (ref 70–110)
GLUCOSE SERPL-MCNC: 147 MG/DL (ref 70–110)
GLUCOSE SERPL-MCNC: 179 MG/DL (ref 70–110)
GLUCOSE SERPL-MCNC: 184 MG/DL (ref 70–110)
GLUCOSE SERPL-MCNC: 187 MG/DL (ref 70–110)
POTASSIUM SERPL-SCNC: 4.4 MMOL/L (ref 3.5–5.1)
PROT SERPL-MCNC: 6.1 G/DL (ref 6–8.4)
SODIUM SERPL-SCNC: 134 MMOL/L (ref 136–145)

## 2023-01-19 PROCEDURE — 27000221 HC OXYGEN, UP TO 24 HOURS

## 2023-01-19 PROCEDURE — 97165 OT EVAL LOW COMPLEX 30 MIN: CPT

## 2023-01-19 PROCEDURE — 94618 PULMONARY STRESS TESTING: CPT

## 2023-01-19 PROCEDURE — 99900035 HC TECH TIME PER 15 MIN (STAT)

## 2023-01-19 PROCEDURE — 25000003 PHARM REV CODE 250: Performed by: INTERNAL MEDICINE

## 2023-01-19 PROCEDURE — 25000003 PHARM REV CODE 250: Performed by: NURSE PRACTITIONER

## 2023-01-19 PROCEDURE — 21000000 HC CCU ICU ROOM CHARGE

## 2023-01-19 PROCEDURE — 80053 COMPREHEN METABOLIC PANEL: CPT | Performed by: NURSE PRACTITIONER

## 2023-01-19 PROCEDURE — 36415 COLL VENOUS BLD VENIPUNCTURE: CPT | Performed by: NURSE PRACTITIONER

## 2023-01-19 PROCEDURE — 12000002 HC ACUTE/MED SURGE SEMI-PRIVATE ROOM

## 2023-01-19 PROCEDURE — 25000003 PHARM REV CODE 250: Performed by: STUDENT IN AN ORGANIZED HEALTH CARE EDUCATION/TRAINING PROGRAM

## 2023-01-19 PROCEDURE — 99900031 HC PATIENT EDUCATION (STAT)

## 2023-01-19 PROCEDURE — 97535 SELF CARE MNGMENT TRAINING: CPT

## 2023-01-19 PROCEDURE — 94761 N-INVAS EAR/PLS OXIMETRY MLT: CPT

## 2023-01-19 PROCEDURE — 25000003 PHARM REV CODE 250

## 2023-01-19 RX ORDER — CHLORHEXIDINE GLUCONATE ORAL RINSE 1.2 MG/ML
15 SOLUTION DENTAL 2 TIMES DAILY
Status: DISPENSED | OUTPATIENT
Start: 2023-01-19 | End: 2023-01-24

## 2023-01-19 RX ORDER — IPRATROPIUM BROMIDE 0.5 MG/2.5ML
0.5 SOLUTION RESPIRATORY (INHALATION)
Status: DISCONTINUED | OUTPATIENT
Start: 2023-01-19 | End: 2023-01-22

## 2023-01-19 RX ORDER — MUPIROCIN 20 MG/G
OINTMENT TOPICAL 2 TIMES DAILY
Status: COMPLETED | OUTPATIENT
Start: 2023-01-19 | End: 2023-01-23

## 2023-01-19 RX ADMIN — QUETIAPINE 50 MG: 25 TABLET ORAL at 09:01

## 2023-01-19 RX ADMIN — NIFEDIPINE 90 MG: 30 TABLET, FILM COATED, EXTENDED RELEASE ORAL at 09:01

## 2023-01-19 RX ADMIN — ASPIRIN 81 MG CHEWABLE TABLET 81 MG: 81 TABLET CHEWABLE at 09:01

## 2023-01-19 RX ADMIN — QUETIAPINE 50 MG: 25 TABLET ORAL at 08:01

## 2023-01-19 RX ADMIN — DOXAZOSIN 2 MG: 1 TABLET ORAL at 09:01

## 2023-01-19 RX ADMIN — INSULIN DETEMIR 5 UNITS: 100 INJECTION, SOLUTION SUBCUTANEOUS at 08:01

## 2023-01-19 RX ADMIN — TAMSULOSIN HYDROCHLORIDE 0.4 MG: 0.4 CAPSULE ORAL at 09:01

## 2023-01-19 RX ADMIN — MUPIROCIN 1 G: 20 OINTMENT TOPICAL at 08:01

## 2023-01-19 RX ADMIN — ATORVASTATIN CALCIUM 40 MG: 40 TABLET, FILM COATED ORAL at 09:01

## 2023-01-19 RX ADMIN — POLYETHYLENE GLYCOL 3350 17 G: 17 POWDER, FOR SOLUTION ORAL at 09:01

## 2023-01-19 RX ADMIN — SODIUM PHOSPHATE 1 ENEMA: 7; 19 ENEMA RECTAL at 09:01

## 2023-01-19 RX ADMIN — MUPIROCIN 1 G: 20 OINTMENT TOPICAL at 09:01

## 2023-01-19 RX ADMIN — CHLORHEXIDINE GLUCONATE 15 ML: 1.2 RINSE ORAL at 09:01

## 2023-01-19 RX ADMIN — CHLORHEXIDINE GLUCONATE 15 ML: 1.2 RINSE ORAL at 08:01

## 2023-01-19 RX ADMIN — DOCUSATE SODIUM 100 MG: 100 CAPSULE, LIQUID FILLED ORAL at 09:01

## 2023-01-19 RX ADMIN — METOPROLOL SUCCINATE 25 MG: 25 TABLET, EXTENDED RELEASE ORAL at 09:01

## 2023-01-19 RX ADMIN — LEVOTHYROXINE SODIUM 50 MCG: 0.03 TABLET ORAL at 05:01

## 2023-01-19 RX ADMIN — PANTOPRAZOLE SODIUM 40 MG: 40 TABLET, DELAYED RELEASE ORAL at 05:01

## 2023-01-19 NOTE — CARE UPDATE
01/19/23 0734   Patient Assessment/Suction   Level of Consciousness (AVPU) alert   PRE-TX-O2   Device (Oxygen Therapy) nasal cannula   $ Is the patient on Low Flow Oxygen? Yes   SpO2 98 %   Pulse Oximetry Type Continuous   $ Pulse Oximetry - Multiple Charge Pulse Oximetry - Multiple   Pulse 61   Resp 15   Aerosol Therapy   $ Aerosol Therapy Charges Refused   Education   $ Education Bronchodilator;15 min   Respiratory Evaluation   $ Care Plan Tech Time 15 min

## 2023-01-19 NOTE — PROGRESS NOTES
Nephrology Progress Note        Patient Name: Pam Gray  MRN: [id number]    Patient Class: OP- Observation   Admission Date: 1/16/2023  Length of Stay: 0 days  Date of Service: 1/19/2023    Attending Physician: Chun Oliver MD  Primary Care Provider: Jacob Zuniga MD    Reason for Consult: sob/brea/hyperkalemia/hyponatremia/acidosis/chf/anemia/thn    SUBJECTIVE:     HPI: 62M with past medical history of anxiety, arthritis, CHF, CKD stage 3, COPD, CAD, diabetes mellitus, peripheral neuropathy, CVA presented initially on 1/10 to the emergency department at [facility] in [place], [place] via ambulance with complaints of SI and depression. He was sent PECed to [facility] and now brought to [facility] with SOB. Labs show BREA, hyperkalemia. ABG with slight acidosis.    1/17  VSS, K+ at goal.  1.7L UOP recorded.  No complaints.  1/18 VSS. Appreciate cards recommendations and agree with plan.  1/19 genie, BP stable, on 3L NC, UOP 1.8L    Outpatient meds:  No current facility-administered medications on file prior to encounter.     Current Outpatient Medications on File Prior to Encounter   Medication Sig Dispense Refill    albuterol-ipratropium (DUO-NEB) 2.5 mg-0.5 mg/3 mL nebulizer solution Take 3 mLs by nebulization every 6 (six) hours while awake. Rescue 270 mL 0    aspirin 81 MG Chew Take 1 tablet (81 mg total) by mouth once daily. 30 tablet 0    atorvastatin (LIPITOR) 40 MG tablet Take 1 tablet (40 mg total) by mouth once daily. 30 tablet 0    clonazePAM (KLONOPIN) 0.5 MG tablet Take 0.5 mg by mouth 2 (two) times daily as needed for Anxiety.      doxazosin (CARDURA) 2 MG tablet Take 1 tablet (2 mg total) by mouth once daily. 30 tablet 0    fluticasone furoate-vilanteroL (BREO) 100-25 mcg/dose diskus inhaler Inhale 1 puff into the lungs once daily. Controller 30 each 0    fluvoxaMINE (LUVOX) 25 MG tablet Take 1 tablet (25 mg total) by mouth every evening. 90 tablet 1    gabapentin (NEURONTIN) 300 MG  capsule Take 1 capsule by mouth once daily.      insulin aspart U-100 (NOVOLOG) 100 unit/mL (3 mL) InPn pen Inject 1-10 Units into the skin before meals and at bedtime as needed (Hyperglycemia).  0    insulin detemir U-100 (LEVEMIR FLEXTOUCH U-100 INSULN) 100 unit/mL (3 mL) InPn pen Inject 10 Units into the skin every evening.  0    levothyroxine (SYNTHROID) 50 MCG tablet Take 1 tablet (50 mcg total) by mouth once daily.      metoprolol succinate (TOPROL-XL) 25 MG 24 hr tablet Take 1 tablet (25 mg total) by mouth once daily. 30 tablet 0    NIFEdipine (PROCARDIA-XL) 90 MG (OSM) 24 hr tablet Take 1 tablet (90 mg total) by mouth once daily. 30 tablet 0    pantoprazole (PROTONIX) 40 MG tablet Take 1 tablet (40 mg total) by mouth once daily. 30 tablet 0    QUEtiapine (SEROQUEL) 50 MG tablet Take 1 tablet (50 mg total) by mouth 2 (two) times daily. 60 tablet 11    tamsulosin (FLOMAX) 0.4 mg Cap Take 1 capsule (0.4 mg total) by mouth once daily.      tiotropium bromide (SPIRIVA RESPIMAT) 2.5 mcg/actuation inhaler Inhale 2 puffs into the lungs once daily. Controller 4 g 0    [DISCONTINUED] albuterol (PROVENTIL) 2.5 mg /3 mL (0.083 %) nebulizer solution USE 1 VIAL VIA NEBULIZER EVERY 6 HOURS AS NEEDED 7/9/21      [DISCONTINUED] amLODIPine (NORVASC) 10 MG tablet Take 1 tablet (10 mg total) by mouth once daily. 30 tablet 11    [DISCONTINUED] furosemide (LASIX) 80 MG tablet Take 1 tablet (80 mg total) by mouth 2 (two) times daily. 60 tablet 0    [DISCONTINUED] isosorbide-hydrALAZINE 20-37.5 mg (BIDIL) 20-37.5 mg Tab Take 1 tablet by mouth 2 (two) times daily. 60 tablet 0    [DISCONTINUED] metoprolol tartrate (LOPRESSOR) 50 MG tablet Take 1 tablet (50 mg total) by mouth 2 (two) times daily. 60 tablet 11    [DISCONTINUED] mirtazapine (REMERON) 30 MG tablet Take 30 mg by mouth every evening.      [DISCONTINUED] omeprazole (PRILOSEC) 40 MG capsule Take 1 capsule (40 mg total) by mouth once daily. 30 capsule 11    [DISCONTINUED]  pioglitazone (ACTOS) 30 MG tablet Take 30 mg by mouth once daily.      [DISCONTINUED] rivastigmine (EXELON) 4.6 mg/24 hour PT24 Place 1 patch onto the skin once daily. 30 patch 11    [DISCONTINUED] SITagliptin (JANUVIA) 100 MG Tab Take 100 mg by mouth once daily.      [DISCONTINUED] traZODone (DESYREL) 50 MG tablet Take 50 mg by mouth every evening.         Scheduled meds:   arformoteroL  15 mcg Nebulization BID    aspirin  81 mg Oral Daily    atorvastatin  40 mg Oral Daily    budesonide  0.5 mg Nebulization Q12H    chlorhexidine  15 mL Mouth/Throat BID    docusate sodium  100 mg Oral Daily    doxazosin  2 mg Oral Daily    fluvoxaMINE  25 mg Oral QHS    furosemide (LASIX) injection  40 mg Intravenous Daily    insulin detemir U-100  5 Units Subcutaneous QHS    ipratropium  0.5 mg Nebulization Q6H WAKE    levothyroxine  50 mcg Oral Before breakfast    metoprolol succinate  25 mg Oral Daily    mupirocin   Nasal BID    NIFEdipine  90 mg Oral Daily    pantoprazole  40 mg Oral Before breakfast    polyethylene glycol  17 g Oral Daily    QUEtiapine  50 mg Oral BID    tamsulosin  0.4 mg Oral Daily       Infusions:      PRN meds:      Review of Systems:  Sleeping    OBJECTIVE:     Vital Signs and IO:  Temp:  [97.1 °F (36.2 °C)-98.7 °F (37.1 °C)]   Pulse:  [58-77]   Resp:  [15-36]   BP: (112-140)/(54-77)   SpO2:  [75 %-98 %]   I/O last 3 completed shifts:  In: 510 [P.O.:510]  Out: 1975 [Urine:1975]  Wt Readings from Last 5 Encounters:   01/17/23 84.1 kg (185 lb 8 oz)   01/16/23 90.7 kg (200 lb)   01/10/23 89.4 kg (197 lb)   09/14/22 89.5 kg (197 lb 4.8 oz)   08/25/22 91.2 kg (201 lb)     Body mass index is 27.39 kg/m².    Physical Exam  Constitutional:       General: He is not in acute distress.     Appearance: He is well-developed. She is not diaphoretic.   HENT:      Head: Normocephalic and atraumatic.      Mouth/Throat:      Mouth: Mucous membranes are moist.   Eyes:      General: No scleral icterus.     Pupils: Pupils are  equal, round, and reactive to light.   Cardiovascular:      Rate and Rhythm: Normal rate and regular rhythm.   Pulmonary:      Effort: Pulmonary effort is normal. No respiratory distress.      Breath sounds: No stridor.   Abdominal:      General: There is no distension.      Palpations: Abdomen is soft.   Musculoskeletal:         General: No deformity. Normal range of motion.      Cervical back: Neck supple.   Skin:     General: Skin is warm and dry.      Findings: No rash present. No erythema.   Neurological:      Did not evaluate  Psychiatric:         Calm per sitter    Laboratory:  Recent Labs   Lab 01/17/23  0504 01/18/23  0421 01/19/23  0449     136 137 134*   K 4.1  4.1 4.6 4.4     103 103 102   CO2 22*  23 26 26   BUN 68*  67* 71* 68*   CREATININE 3.2*  3.2* 3.0* 2.9*   GLU 97  95 131* 122*         Recent Labs   Lab 01/16/23  1250 01/16/23  1542 01/16/23  1832 01/17/23  0504 01/18/23  0421 01/19/23  0449   CALCIUM 8.4*  --    < > 8.4*  8.4* 8.6* 8.3*   ALBUMIN 3.2*  --   --  2.9* 2.8* 2.8*   PHOS 4.6*  --   --   --   --   --    MG  --  2.6  --   --  2.5  --     < > = values in this interval not displayed.         Recent Labs   Lab 07/10/21  1030 07/11/21  0625   PTH, Intact 92.2 H 129.7 H         No results for input(s): POCTGLUCOSE in the last 168 hours.    Recent Labs   Lab 07/26/22  0631 09/14/22  1526 01/16/23  1807   Hemoglobin A1C 6.1 H 6.1 H 6.6 H         Recent Labs   Lab 01/16/23  1015 01/16/23  1807   WBC 8.03 7.16   HGB 10.4* 9.7*   HCT 34.0* 31.9*    127*   MCV 79* 78*   MCHC 30.6* 30.4*   MONO 9.7  0.8 9.8  0.7         Recent Labs   Lab 01/17/23  0504 01/18/23  0421 01/19/23  0449   BILITOT 1.1* 0.9 0.8   PROT 6.0 6.1 6.1   ALBUMIN 2.9* 2.8* 2.8*   ALKPHOS 53* 61 68   ALT 16 19 23   AST 15 18 27         Recent Labs   Lab 07/25/22  1726 09/14/22  1539 01/10/23  1035 01/16/23  1401   Color, UA Yellow Yellow Yellow Yellow   Appearance, UA Clear Cloudy A Clear Clear   pH,  UA 6.0 5.0 6.0 6.0   Specific [place], UA 1.020 1.020 >=1.030 A 1.010   Protein, UA 3+ A 2+ A 3+ A 1+ A   Glucose, UA 1+ A Negative Negative Negative   Ketones, UA Negative Negative Negative Negative   Urobilinogen, UA Negative 1.0  --  Negative   Bilirubin (UA) Negative Negative 1+ A Negative   Occult Blood UA Trace A Negative Trace A Negative   Nitrite, UA Negative Negative Negative Negative   RBC, UA 0 4 1 1   WBC, UA 4 3 6 H 14 H   Bacteria Rare Negative None Negative   Hyaline Casts, UA 13 A 3.5 A 1 5 A         Recent Labs   Lab 04/06/22  0953 09/14/22  1550 01/16/23  1047   POC PH 7.365 7.409 7.323 L   POC PCO2 39.9 25.8 L 38.9   POC HCO3 22.3 L 18.5 20.2 L   POC PO2 60.9 L 58.8 LL 60 L   POC SATURATED O2  --  93.9 89 L   POC BE  --   --  -6   Sample  --   --  ARTERIAL         Microbiology Results (last 7 days)       ** No results found for the last 168 hours. **          Echo:  The left ventricle is normal in size with moderate concentric hypertrophy and normal systolic function.  The estimated ejection fraction is 60%.  Indeterminate left ventricular diastolic function.  Normal right ventricular size with normal right ventricular systolic function.  Mild left atrial enlargement.  There is severe aortic valve stenosis.  Aortic valve area is 0.98 cm2; peak velocity is 4.2 m/s; mean gradient is 45 mmHg.  Mild tricuspid regurgitation.  Normal central venous pressure (3 mmHg).  The estimated PA systolic pressure is 16 mmHg.  Mildly elevated mean gradient across mitral valve with normal mitral valve area by pressure half time.     ASSESSMENT/PLAN:     BREA  CKD stage 3, baseline sCr around 2  BPH  - renal function improving slowly  - nonoliguric  - continue flomax  - no nsaids or IV contrast  - dose meds for CrCl < 30    Severe AS  Valvular CHF  - trop improved with diuresis  - switch to oral regimen PRN per cardiology recs- fine with this    SHPT  Anemia of CKD  - stable    Thank you for allowing us to participate  in the care of your patient!   We will follow the patient and provide recommendations as needed.    Patient care time was spent personally by me on the following activities: > 35 min    Obtaining a history.  Examination of patient.  Providing medical care at the patients bedside.  Developing a treatment plan with patient or surrogate and bedside caregivers.  Ordering and reviewing laboratory studies, radiographic studies, pulse oximetry.  Ordering and performing treatments and interventions.  Evaluation of patient's response to treatment.  Discussions with consultants while on the unit and immediately available to the patient.  Re-evaluation of the patient's condition.  Documentation in the medical record.     Kenzie Tang MD      [facility]  [address] [place] [address], [place] [address]    [phone] - tel  [phone] - fax    1/19/2023

## 2023-01-19 NOTE — PLAN OF CARE
Problem: Skin Injury Risk Increased  Goal: Skin Health and Integrity  Intervention: Promote and Optimize Oral Intake  Flowsheets (Taken 1/19/2023 1115)  Oral Nutrition Promotion: calorie-dense liquids provided     Problem: Oral Intake Inadequate  Goal: Improved Oral Intake  Outcome: Ongoing, Progressing  Intervention: Promote and Optimize Oral Intake  Flowsheets (Taken 1/19/2023 1115)  Oral Nutrition Promotion: calorie-dense liquids provided

## 2023-01-19 NOTE — CARE UPDATE
01/19/23 1404   Home Oxygen Qualification   $ Home O2 Qualification Pulmonary Stress Test/6 min walk;Tech time 15 minutes   Room Air SpO2 At Rest 92 %   Room Air SpO2 During Ambulation (!) 87 %   SpO2 During Ambulation on O2 94 %   Heart Rate on O2 75 bpm   Ambulation O2 LPM 2 LPM   SpO2 Post Ambulation 96 %   Post Ambulation Heart Rate 68 bpm   Post Ambulation O2 LPM 2 LPM   Home O2 Eval Comments pt qualifies for home O2

## 2023-01-19 NOTE — PROGRESS NOTES
Critical access hospital Medicine  Progress Note    Patient name: Pam Gray  MRN: [id number]  Admit Date: 1/16/2023   LOS: 0 days     SUBJECTIVE:     Principal problem: Acute on chronic combined systolic and diastolic CHF, NYHA class 1    Interval History:      1/18- more talkative today.  Sitter in room.  Plan to return to [facility] pending further cardiology w/u for AS and poss isch heart disease    1/17- Patient is currently roomed in hallway, denies chest pain.  Shortness of breath has improved.  Was at [facility] without oxygen, baseline req is 2LNC.  Difficult historian.  Follow up cards recs.     1/19  patient seen examined today.  Denies any shortness of breath, chest pain, dizziness, lightheadedness.  He says he requires oxygen at home but was not taking it at behavioral center.     Scheduled Meds:   arformoteroL  15 mcg Nebulization BID    aspirin  81 mg Oral Daily    atorvastatin  40 mg Oral Daily    budesonide  0.5 mg Nebulization Q12H    chlorhexidine  15 mL Mouth/Throat BID    docusate sodium  100 mg Oral Daily    doxazosin  2 mg Oral Daily    fluvoxaMINE  25 mg Oral QHS    insulin detemir U-100  5 Units Subcutaneous QHS    ipratropium  0.5 mg Nebulization Q6H WAKE    levothyroxine  50 mcg Oral Before breakfast    metoprolol succinate  25 mg Oral Daily    mupirocin   Nasal BID    NIFEdipine  90 mg Oral Daily    pantoprazole  40 mg Oral Before breakfast    polyethylene glycol  17 g Oral Daily    QUEtiapine  50 mg Oral BID    tamsulosin  0.4 mg Oral Daily     Continuous Infusions:  PRN Meds:clonazePAM, dextrose 10%, dextrose 10%, glucagon (human recombinant), glucose, glucose, insulin aspart U-100, ipratropium, naloxone, sodium chloride 0.9%    Review of patient's allergies indicates:   Allergen Reactions    Onion Other (See Comments)     THROAT SWELLS    Pork/porcine containing products      Mandaen PREFERENCE    Shellfish containing products      Mandaen PREFERENCE      Shrimp       Uatsdin PREFERENCE         Review of Systems: As per interval history    OBJECTIVE:     Vital Signs (Most Recent)  Temp: 98.5 °F (36.9 °C) (01/19/23 1507)  Pulse: 68 (01/19/23 0926)  Resp: 15 (01/19/23 0734)  BP: (!) 112/54 (01/19/23 0926)  SpO2: 98 % (01/19/23 0734)    Vital Signs Range (Last 24H):  Temp:  [97.1 °F (36.2 °C)-98.7 °F (37.1 °C)]   Pulse:  [58-77]   Resp:  [15-35]   BP: (112-140)/(54-63)   SpO2:  [75 %-98 %]     I & O (Last 24H):  Intake/Output Summary (Last 24 hours) at 1/19/2023 1726  Last data filed at 1/19/2023 1705  Gross per 24 hour   Intake 120 ml   Output 1700 ml   Net -1580 ml       Physical Exam:  General: Patient resting eyes closed, arouses easily  Eyes: No conjunctival injection. No scleral icterus.  ENT: Hearing grossly intact. No discharge from ears. No nasal discharge.   CVS: RRR. trace LE edema BL  Lungs:  No tachypnea or accessory muscle use.  Clear to auscultation bilaterally  Abdomen:  Soft, nontender and nondistended.  No organomegaly  Neuro: Alert. Speech is slow and slurred. Moves all extremities. Follows commands.       Laboratory:  I have reviewed all pertinent lab results within the past 24 hours.    Diagnostic Results:  Labs: Reviewed  ECG: Reviewed  X-Ray: Reviewed  Echo: Reviewed    ASSESSMENT/PLAN:     Shortness of breath  COPD  HFpEF  Severe aortic stenosis  Restart home controllers for copd- pt refusing  Duonebs prn  Continue IV lasix 40 mg daily  Echo w/EF 60%, suspect some degree of diastolic dysfx as well as severe AS  Cardiology consulted  Considering possible SAVR vs TAVR, but would be fairly high risk   Patient appears to be at his baseline clinical status.  He is supposed to be on oxygen chronically which he was not on at home    BREA/CKD  Hyperkalemia   - resolved    Dispo   Medically cleared however looks like behavioral will  not take him with oxygen.    Active Hospital Problems    Diagnosis  POA    *Acute on chronic combined systolic and diastolic CHF, NYHA  class 1 [I50.43]  Yes    Hyponatremia [E87.1]  Yes    Acute respiratory failure with hypoxia [J96.01]  Yes    Type 2 diabetes mellitus with neurologic complication, with long-term current use of insulin [E11.49, Z79.4]  Not Applicable    Steroid-induced hyperglycemia [R73.9, T38.0X5A]  Yes    MDD (major depressive disorder), recurrent episode, moderate [F33.1]  Yes    Microcytic anemia [D50.9]  Yes    Vascular dementia with delirium [F01.50, F05]  Yes    Methamphetamine / cocaine dependence [F15.20]  Yes    Hypothyroid [E03.9]  Yes    CAD (coronary artery disease) [I25.10]  Yes    Hyperkalemia [E87.5]  Yes    Stage 3 severe COPD by GOLD classification [J44.9]  Yes    Hypertension [I10]  Yes    AYDEE on CPAP [G47.33, Z99.89]  Not Applicable      Resolved Hospital Problems   No resolved problems to display.               VTE Risk Mitigation (From admission, onward)           Ordered     Reason for No Pharmacological VTE Prophylaxis  Once        Question:  Reasons:  Answer:  Physician Provided (leave comment)    01/16/23 1353     Place RADHA hose  Until discontinued         01/16/23 1353     IP VTE HIGH RISK PATIENT  Once         01/16/23 1353     Place sequential compression device  Until discontinued         01/16/23 1353                        Department Hospital Medicine  [facility]  Chun Booker MD

## 2023-01-19 NOTE — PLAN OF CARE
Problem: Violence Risk or Actual  Goal: Anger and Impulse Control  Outcome: Ongoing, Progressing     Problem: Adult Inpatient Plan of Care  Goal: Plan of Care Review  Outcome: Ongoing, Progressing  Goal: Patient-Specific Goal (Individualized)  Outcome: Ongoing, Progressing  Goal: Absence of Hospital-Acquired Illness or Injury  Outcome: Ongoing, Progressing  Goal: Optimal Comfort and Wellbeing  Outcome: Ongoing, Progressing  Goal: Readiness for Transition of Care  Outcome: Ongoing, Progressing     Problem: Diabetes Comorbidity  Goal: Blood Glucose Level Within Targeted Range  Outcome: Ongoing, Progressing     Problem: Fluid Imbalance (Pneumonia)  Goal: Fluid Balance  Outcome: Ongoing, Progressing     Problem: Infection (Pneumonia)  Goal: Resolution of Infection Signs and Symptoms  Outcome: Ongoing, Progressing     Problem: Respiratory Compromise (Pneumonia)  Goal: Effective Oxygenation and Ventilation  Outcome: Ongoing, Progressing     Problem: Skin Injury Risk Increased  Goal: Skin Health and Integrity  Outcome: Ongoing, Progressing     Problem: Fall Injury Risk  Goal: Absence of Fall and Fall-Related Injury  Outcome: Ongoing, Progressing     Problem: Suicidal Behavior  Goal: Suicidal Behavior is Absent or Managed  Outcome: Ongoing, Progressing

## 2023-01-19 NOTE — PLAN OF CARE
Problem: Occupational Therapy  Goal: Occupational Therapy Goal  Description: Goals to be met by: 2/9/2023     Patient will increase functional independence with ADLs by performing:    LE Dressing with Modified [place].  Grooming while standing at sink with Modified [place].  Toileting from toilet with Modified [place] for hygiene and clothing management.   Supine to sit with Modified [place].  Toilet transfer to toilet with Modified [place].    Outcome: Ongoing, Progressing      no chest pain/no palpitations/no dyspnea on exertion

## 2023-01-19 NOTE — PROGRESS NOTES
Formerly [facility]  Adult Nutrition   Progress Note (Initial Assessment)     SUMMARY     Recommendations  1) Continue current 2200 kcal ADA/Renal diet as tolerated and encourage intake.   2) RD recommends adding Glucerna with breakfast and Unjury with lunch and dinner.   3)  continue to obtain meal preferences daily.    Goals:   Pt to meet 75 to 100% of his EEN/EPN.    Nutrition Goal Status: goal not met, progressing towards goal    Communication of RD Recs: other (comment)    Dietitian Rounds Brief  LOS: Spoke with pt and he said he was talking with his MD about liberalizing his diet. He was glad when I told him that I had added some Glucerna with BF and Unjury with L and D. His LBM was 1/9/2023???..he has a stool softener and some miralax ordered but must not be working. I have message his nurse and will follow up with that.       Reason for Consult: sob/brea/hyperkalemia/hyponatremia/acidosis/chf/anemia/thn     SUBJECTIVE:      HPI: 62M with past medical history of anxiety, arthritis, CHF, CKD stage 3, COPD, CAD, diabetes mellitus, peripheral neuropathy, CVA presented initially on 1/10 to the emergency department at [facility] in [place], [place] via ambulance with complaints of SI and depression. He was sent PECed to [facility] and now brought to [facility] with SOB. Labs show BREA, hyperkalemia. ABG with slight acidosis.     1/17  VSS, K+ at goal.  1.7L UOP recorded.  No complaints.  1/18 VSS. Appreciate cards recommendations and agree with plan.  1/19 genie, BP stable, on 3L NC, UOP 1.8L    Diet order:   Current Diet Order: Renal/2200 kcal ADA                 Evaluation of Received Nutrient/Fluid Intake  Energy Calories Required: not meeting needs  Protein Required: not meeting needs  Fluid Required: meeting needs  Tolerance: tolerating     % Intake of Estimated Energy Needs: 75 - 100 %  % Meal Intake: 75 - 100 %      Intake/Output Summary (Last 24 hours) at 1/19/2023 1115  Last data filed at  "1/19/2023 0945  Gross per 24 hour   Intake 120 ml   Output 1550 ml   Net -1430 ml        Anthropometrics  Temp: 98 °F (36.7 °C)  Height Method: Stated  Height: 5' 9" (175.3 cm)  Height (inches): 69 in  Weight Method: Bed Scale  Weight: 84.1 kg (185 lb 8 oz)  Weight (lb): 185.5 lb  Ideal Body Weight (IBW), Male: 160 lb  % Ideal Body Weight, Male (lb): 125 %  BMI (Calculated): 27.4  BMI Grade: 25 - 29.9 - overweight       Estimated/Assessed Needs  Weight Used For Calorie Calculations: 84 kg (185 lb 3 oz)  Energy Calorie Requirements (kcal): 7858-5705 kcals/day  Energy Need Method: Kcal/kg  Protein Requirements: 110-146 g/day  Weight Used For Protein Calculations: 73 kg (160 lb 15 oz)     Estimated Fluid Requirement Method: RDA Method  RDA Method (mL): 2100       Reason for Assessment  Reason For Assessment: length of stay  Diagnosis: other (see comments) (Acute on chronic combined systolic and diastolic CHF, NYHA class 1)  Relevant Medical History: AYDEE on CPAP, Hypertension, Stage 3 severe COPD by GOLD classification, Hyperkalemia, Methamphetamine / cocaine dependence, CAD (coronary artery disease), Vascular dementia with delirium, Hypothyroid, Microcytic anemia, MDD (major depressive disorder), recurrent episode, moderate, Steroid-induced hyperglycemia, Type 2 diabetes mellitus with neurologic complication, with long-term current use of insulin, Hyponatremia, Acute respiratory failure with hypoxia    Nutrition/Diet History  Spiritual, Cultural Beliefs, Anabaptism Practices, Values that Affect Care: no  Food Allergies: shellfish, other (see comments) (Onion, (See Comments) THROAT SWELLS, Pork/porcine Containing Products, Shellfish Containing Products, Shrimp)  Factors Affecting Nutritional Intake: None identified at this time    Nutrition Risk Screen  Nutrition Risk Screen: no indicators present             Weight History:  Wt Readings from Last 5 Encounters:   01/17/23 84.1 kg (185 lb 8 oz)   01/16/23 90.7 kg (200 lb) "   01/10/23 89.4 kg (197 lb)   09/14/22 89.5 kg (197 lb 4.8 oz)   08/25/22 91.2 kg (201 lb)        Lab/Procedures/Meds: Pertinent Labs/Meds Reviewed    Medications:Pertinent Medications Reviewed  Scheduled Meds:   arformoteroL  15 mcg Nebulization BID    aspirin  81 mg Oral Daily    atorvastatin  40 mg Oral Daily    budesonide  0.5 mg Nebulization Q12H    chlorhexidine  15 mL Mouth/Throat BID    docusate sodium  100 mg Oral Daily    doxazosin  2 mg Oral Daily    fluvoxaMINE  25 mg Oral QHS    insulin detemir U-100  5 Units Subcutaneous QHS    ipratropium  0.5 mg Nebulization Q6H WAKE    levothyroxine  50 mcg Oral Before breakfast    metoprolol succinate  25 mg Oral Daily    mupirocin   Nasal BID    NIFEdipine  90 mg Oral Daily    pantoprazole  40 mg Oral Before breakfast    polyethylene glycol  17 g Oral Daily    QUEtiapine  50 mg Oral BID    tamsulosin  0.4 mg Oral Daily     Continuous Infusions:  PRN Meds:.clonazePAM, dextrose 10%, dextrose 10%, glucagon (human recombinant), glucose, glucose, insulin aspart U-100, ipratropium, naloxone, sodium chloride 0.9%    Labs: Pertinent Labs Reviewed  Clinical Chemistry:  Recent Labs   Lab 01/16/23  1250 01/16/23  1542 01/18/23  0421 01/19/23  0449   *   < > 137 134*   K 5.7*   < > 4.6 4.4      < > 103 102   CO2 21*   < > 26 26   *   < > 131* 122*   BUN 65*   < > 71* 68*   CREATININE 3.2*   < > 3.0* 2.9*   CALCIUM 8.4*   < > 8.6* 8.3*   PROT  --    < > 6.1 6.1   ALBUMIN 3.2*   < > 2.8* 2.8*   BILITOT  --    < > 0.9 0.8   ALKPHOS  --    < > 61 68   AST  --    < > 18 27   ALT  --    < > 19 23   ANIONGAP 9   < > 8 6*   MG  --    < > 2.5  --    PHOS 4.6*  --   --   --     < > = values in this interval not displayed.     CBC:   Recent Labs   Lab 01/16/23  1807   WBC 7.16   RBC 4.08*   HGB 9.7*   HCT 31.9*   *   MCV 78*   MCH 23.8*   MCHC 30.4*     Lipid Panel:  No results for input(s): CHOL, HDL, LDLCALC, TRIG, CHOLHDL in the last 168 hours.  Cardiac  Profile:  Recent Labs   Lab 01/16/23  1015 01/16/23  1542   BNP 1,225* 1,118*   CPK  --  43     Inflammatory Labs:  No results for input(s): CRP in the last 168 hours.  Diabetes:  Recent Labs   Lab 01/16/23  1807   HGBA1C 6.6*     Thyroid & Parathyroid:  No results for input(s): TSH, FREET4, G0SNALV, S6OVWRK, THYROIDAB in the last 168 hours.    Monitor and Evaluation  Food and Nutrient Intake: food and beverage intake, energy intake  Food and Nutrient Adminstration: diet order  Knowledge/Beliefs/Attitudes: food and nutrition knowledge/skill, beliefs and attitudes  Physical Activity and Function: nutrition-related ADLs and IADLs, factors affecting access to physical activity  Anthropometric Measurements: weight, weight change, body mass index  Biochemical Data, Medical Tests and Procedures: lipid profile, inflammatory profile, glucose/endocrine profile, gastrointestinal profile, electrolyte and renal panel  Nutrition-Focused Physical Findings: overall appearance     Nutrition Risk  Level of Risk/Frequency of Follow-up: moderate     Nutrition Follow-Up  RD Follow-up?: Yes      Tessie Toscano RD 01/19/2023 11:15 AM

## 2023-01-19 NOTE — PT/OT/SLP EVAL
Occupational Therapy   Evaluation    Name: Pam Gray  MRN: [id number]  Admitting Diagnosis: Acute on chronic combined systolic and diastolic CHF, NYHA class 1  Recent Surgery: * No surgery found *      Recommendations:     Discharge Recommendations: home health OT  Discharge Equipment Recommendations:  none  Barriers to discharge:  Decreased caregiver support    Assessment:     Pam Gray is a 62 y.o. male with a medical diagnosis of Acute on chronic combined systolic and diastolic CHF, NYHA class 1.  He presents with c/o chronic numbness in fingertips and feet, which has limited his independent with ADLs and general mobility. Patient reported difficulty ambulating due to neuropathy in feet. Patient ataxic, most notably when ambulating in room with RW. Patient seemed agitated, but was willing to participate. Pat  Performance deficits affecting function: weakness, impaired endurance, impaired self care skills, impaired functional mobility, gait instability, impaired balance, decreased lower extremity function, decreased coordination, decreased safety awareness, impaired sensation, impaired cognition, impaired cardiopulmonary response to activity.      Rehab Prognosis: Fair; patient would benefit from acute skilled OT services to address these deficits and reach maximum level of function.       Plan:     Patient to be seen 5 x/week to address the above listed problems via self-care/home management, therapeutic activities, therapeutic exercises  Plan of Care Expires: 02/09/23  Plan of Care Reviewed with: patient    Subjective     Chief Complaint: neuropathy in hands and feet  Patient/Family Comments/goals: none    Occupational Profile:  Living Environment: Patient transferred from [facility]  Previous level of function: Patient stated he was ambulatory without AD. Patient required assistance with bathing and dressing.   Roles and Routines: Patient is a recent   Equipment Used  at Home: walker, rolling, wheelchair, oxygen, bedside commode  Assistance upon Discharge: Patient has limited assistance from family.     Pain/Comfort:  Pain Rating 1: 0/10  Pain Rating Post-Intervention 1: 0/10    Patients cultural, spiritual, Catholic conflicts given the current situation:      Objective:     Communicated with: nurse Blanca FARMER prior to session.  Patient found right sidelying with telemetry, peripheral IV, oxygen, pulse ox (continuous), bed alarm upon OT entry to room.    General Precautions: Standard, fall  Orthopedic Precautions: N/A  Braces: N/A  Respiratory Status: Nasal cannula, flow 3 L/min    Occupational Performance:    Bed Mobility:    Patient completed Scooting/Bridging with stand by assistance  Patient completed Supine to Sit with stand by assistance  Patient completed Sit to Supine with stand by assistance    Functional Mobility/Transfers:  Patient completed Sit <> Stand Transfer with minimum assistance  with  rolling walker   Patient completed Bed <> Chair Transfer using Stand Pivot technique with minimum assistance with rolling walker  Functional Mobility: Noted unsteadiness when ambulating; ataxia in BLE.    Activities of Daily Living:  Grooming: supervision with all grooming tasks while seated EOB  Lower Body Dressing: stand by assistance to don/doff socks while seated EOB  Toileting: stand by assistance with voiding in urinal in bed    Cognitive/Visual Perceptual:  Cognitive/Psychosocial Skills:     -       Oriented to: x4   -       Follows Commands/attention:Follows multistep  commands  -       Communication: clear/fluent  -       Safety awareness/insight to disability: impaired   -       Mood/Affect/Coping skills/emotional control: Appropriate to situation and Cooperative  Visual/Perceptual:      -Intact     Physical Exam:  Postural examination/scapula alignment:    -       Rounded shoulders  -       Forward head  Upper Extremity Range of Motion:     -       Right Upper  Extremity: WFL  -       Left Upper Extremity: WFL  Upper Extremity Strength:    -       Right Upper Extremity: WFL  -       Left Upper Extremity: WFL   Strength:    -       Right Upper Extremity: WFL  -       Left Upper Extremity: WFL  Fine Motor Coordination:    -       Intact  Gross motor coordination:   WFL in BUE    AMPAC 6 Click ADL:  AMPAC Total Score: 20    Treatment & Education:  OT ed pt on OT role & POC as well as discharge recommendations.  OT ed patient on safety with walker use for functional mobility with cues for hand placement & sequencing.       Patient left HOB elevated with all lines intact, call button in reach, bed alarm on, and sitter present    GOALS:   Multidisciplinary Problems       Occupational Therapy Goals          Problem: Occupational Therapy    Goal Priority Disciplines Outcome Interventions   Occupational Therapy Goal     OT, PT/OT Ongoing, Progressing    Description: Goals to be met by: 2/9/2023     Patient will increase functional independence with ADLs by performing:    LE Dressing with Modified [place].  Grooming while standing at sink with Modified [place].  Toileting from toilet with Modified [place] for hygiene and clothing management.   Supine to sit with Modified [place].  Toilet transfer to toilet with Modified [place].                         History:     Past Medical History:   Diagnosis Date    Anxiety     Arthritis     CHF (congestive heart failure)     Chronic kidney disease     COPD (chronic obstructive pulmonary disease)     Coronary artery disease     Diabetes mellitus     Erectile dysfunction     Hypertension     Necrotizing fasciitis of forearm     Peripheral neuropathy     Stroke     TIA    Thyroid disease          Past Surgical History:   Procedure Laterality Date    arm surgery Right     arthroscopy Right     knee    COLONOSCOPY N/A 7/3/2019    Procedure: COLONOSCOPY;  Surgeon: Jagdish Pollock MD;  Location: [facility];  Service:  Endoscopy;  Laterality: N/A;    ESOPHAGOGASTRODUODENOSCOPY N/A 7/3/2019    Procedure: ESOPHAGOGASTRODUODENOSCOPY (EGD);  Surgeon: Jagdish Pollock MD;  Location: [facility];  Service: Endoscopy;  Laterality: N/A;    EYE SURGERY      FUSION, SPINE, POSTERIOR APPROACH N/A 7/29/2022    Procedure: FUSION,SPINE,POSTERIOR APPROACH, T9-L3;  Surgeon: Luis E Lawson MD;  Location: [facility] OR;  Service: Orthopedics;  Laterality: N/A;    ROTATOR CUFF REPAIR Right     TONSILLECTOMY      VERTEBRAL CORPECTOMY N/A 7/27/2022    Procedure: CORPECTOMY T12;  Surgeon: Luis E Lawson MD;  Location: [facility] OR;  Service: Orthopedics;  Laterality: N/A;       Time Tracking:     OT Date of Treatment: 01/19/23  OT Start Time: 1225  OT Stop Time: 1246  OT Total Time (min): 21 min    Billable Minutes:Evaluation 10  Self Care/Home Management 11    1/19/2023

## 2023-01-19 NOTE — CONSULTS
Critical access hospital  Department of Cardiology  Consult Note      PATIENT NAME: Pam Grya    MRN: [id number]  TODAY'S DATE: 2023  ADMIT DATE: 2023                          CONSULT REQUESTED BY: Luan Liao, *    SUBJECTIVE     PRINCIPAL PROBLEM: Acute on chronic combined systolic and diastolic CHF, NYHA class 1    23:  Patient seen today resting in bed. He is more talkative and reveals that he has seen cardiology in the past. He states he lives in [place], [place] and would like to follow up there. Nurse reports he does not have outpatient support or resources. He states his wife  recently.     REASON FOR CONSULT:    From H&P: Pam Gray is a 62 y.o. male with a history as  has a past medical history of Anxiety, Arthritis, CHF (congestive heart failure), Chronic kidney disease, COPD (chronic obstructive pulmonary disease), Coronary artery disease, Diabetes mellitus, Erectile dysfunction, Hypertension, Necrotizing fasciitis of forearm, Peripheral neuropathy, Stroke, and Thyroid disease. who presented to the ED with a Shortness of Breath (Pt coming from [facility] complaining of SOB. Pt was 79% on room air. Pt states that he is suppose to be on oxygen at all times. Received duo nebulizer en route to ed.)     Patient seen today in the ED observe on BiPAP on a 40% of oxygen able to carry on conversation report loss wife 2 months ago. He was recently admitted  to [facility] approximately a week ago and states he was without his oxygen, however endorses oxygen dependent (on home oxygen at 2 L per nasal cannula) for COPD. Report low energy.  He also states he did feel short of breath but was told his oxygen levels were low today.  Patient also reports smoking some type of synthetic (illegal drug).      Denies fever, chills, diaphoresis, dizziness, HA, chest pain, palpitations, NVD, recent trauma or any other associated symptoms.  Does smoke cigarettes,  drink or do illegal drugs.      Lab and imaging obtained and reviewed CBC showed WBC 8.0, H/H 10.4/34.0, Cl 1 MCH 24.1,RDW 16.1 .  Chemistry profile shows sodium 132,  potassium 5.7, BUN 61, creatinine 3.2, eGFR 21.1 . BNP 1,225 with Troponin High sensitivity 73.7 EKG shows NSR on admit . 98.8 respiratory 18, b/p 125/58 SpO2 88%     CXR  IMPRESSION  Right lung base airspace opacities, potentially atelectasis, or pneumonia in the appropriate clinical setting.      Echo 7/25/22  1. The study quality is average.   2. Global left ventricular systolic function is normal. The left   ventricular ejection fraction is 50-55%.   3. Severe aortic valve stenosis is present. Aortic valve area   continuity equation is 0.9 cm?.     4. Moderate calcification of the aortic valve is noted.  Moderate   mitral annular calcification is noted.     5. The left atrium is mildly enlarged. Left atrial diameter is 4.6   cms.     6. Moderate (2+) aortic regurgitation. Mild (1+) mitral regurgitation.   Trace tricuspid regurgitation.     7. The pulmonary artery appears normal.      Per ED provider patient presented to the ED with a complaint of shortness of breath history of CKD CHF COPD with O2 at home. patient recently have a suicide ideation was admitted in the [facility] for 5 to 7days w/o oxygen . In the ER Spo2 in the 70's on room air was put on 5 L oxygen trend up , currently BiPAP. Elevated BUN/CR 61/ 33 with baseline Cr 2. acute on chronic kidney disease nephrology was consulted.         HPI:    Patient is a 62-year-old male who presented to the emergency room with complaints of shortness of breath and hypoxia.  He was brought in from [facility].  Patient initially required BiPAP but is now transitioned to nasal cannula.  Patient has been treated for COPD and also smoking some sort of synthetic illegal drugs.  Chest x-ray shows right lung opacities and possible pneumonia.  Patient is also noted to have  severe aortic stenosis on echocardiogram.    Unfortunately the patient is very drowsy today and did not answer many questions in the emergency room when evaluated.        Review of patient's allergies indicates:   Allergen Reactions    Onion Other (See Comments)     THROAT SWELLS    Pork/porcine containing products      Temple PREFERENCE    Shellfish containing products      Temple PREFERENCE      Shrimp      Temple PREFERENCE         Past Medical History:   Diagnosis Date    Anxiety     Arthritis     CHF (congestive heart failure)     Chronic kidney disease     COPD (chronic obstructive pulmonary disease)     Coronary artery disease     Diabetes mellitus     Erectile dysfunction     Hypertension     Necrotizing fasciitis of forearm     Peripheral neuropathy     Stroke     TIA    Thyroid disease      Past Surgical History:   Procedure Laterality Date    arm surgery Right     arthroscopy Right     knee    COLONOSCOPY N/A 7/3/2019    Procedure: COLONOSCOPY;  Surgeon: Jagdish Pollock MD;  Location: [facility] ENDO;  Service: Endoscopy;  Laterality: N/A;    ESOPHAGOGASTRODUODENOSCOPY N/A 7/3/2019    Procedure: ESOPHAGOGASTRODUODENOSCOPY (EGD);  Surgeon: Jagdish Pollock MD;  Location: [facility] ENDO;  Service: Endoscopy;  Laterality: N/A;    EYE SURGERY      FUSION, SPINE, POSTERIOR APPROACH N/A 7/29/2022    Procedure: FUSION,SPINE,POSTERIOR APPROACH, T9-L3;  Surgeon: Luis E Lawson MD;  Location: [facility] OR;  Service: Orthopedics;  Laterality: N/A;    ROTATOR CUFF REPAIR Right     TONSILLECTOMY      VERTEBRAL CORPECTOMY N/A 7/27/2022    Procedure: CORPECTOMY T12;  Surgeon: Luis E Lawson MD;  Location: [facility] OR;  Service: Orthopedics;  Laterality: N/A;     Social History     Tobacco Use    Smoking status: Every Day     Packs/day: 1.00     Years: 40.00     Pack years: 40.00     Types: Cigarettes    Smokeless tobacco: Never   Substance Use Topics    Alcohol use: Not Currently    Drug use: Not Currently        REVIEW  OF SYSTEMS    RESPIRATORY: Negative for cough, +shortness of breath   CARDIOVASCULAR: Negative for chest pain. Negative for palpitations and leg swelling.       OBJECTIVE     VITAL SIGNS (Most Recent)  Temp: 97.9 °F (36.6 °C) (01/17/23 2000)  Pulse: 71 (01/18/23 1323)  Resp: (!) 36 (01/18/23 1323)  BP: 124/77 (01/18/23 1100)  SpO2: 98 % (01/18/23 1323)    VENTILATION STATUS  Resp: (!) 36 (01/18/23 1323)  SpO2: 98 % (01/18/23 1323)       I & O (Last 24H):  Intake/Output Summary (Last 24 hours) at 1/18/2023 1822  Last data filed at 1/18/2023 1532  Gross per 24 hour   Intake 390 ml   Output 1275 ml   Net -885 ml         WEIGHTS  Wt Readings from Last 1 Encounters:   01/17/23 1715 84.1 kg (185 lb 8 oz)   01/16/23 1011 90.7 kg (200 lb)       PHYSICAL EXAM  CONSTITUTIONAL: No fever, no chills  HEENT: Normocephalic, atraumatic,pupils reactive to light                 NECK:  No JVD no carotid bruit  CVS: S1S2+, RRR, + murmur  LUNGS: Clear  ABDOMEN: Soft, NT, BS+  EXTREMITIES: No cyanosis, edema  : No burr catheter  NEURO: AAO X 3  PSY: Normal affect      HOME MEDICATIONS:  No current facility-administered medications on file prior to encounter.     Current Outpatient Medications on File Prior to Encounter   Medication Sig Dispense Refill    albuterol-ipratropium (DUO-NEB) 2.5 mg-0.5 mg/3 mL nebulizer solution Take 3 mLs by nebulization every 6 (six) hours while awake. Rescue 270 mL 0    aspirin 81 MG Chew Take 1 tablet (81 mg total) by mouth once daily. 30 tablet 0    atorvastatin (LIPITOR) 40 MG tablet Take 1 tablet (40 mg total) by mouth once daily. 30 tablet 0    clonazePAM (KLONOPIN) 0.5 MG tablet Take 0.5 mg by mouth 2 (two) times daily as needed for Anxiety.      doxazosin (CARDURA) 2 MG tablet Take 1 tablet (2 mg total) by mouth once daily. 30 tablet 0    fluticasone furoate-vilanteroL (BREO) 100-25 mcg/dose diskus inhaler Inhale 1 puff into the lungs once daily. Controller 30 each 0    fluvoxaMINE (LUVOX) 25 MG  tablet Take 1 tablet (25 mg total) by mouth every evening. 90 tablet 1    gabapentin (NEURONTIN) 300 MG capsule Take 1 capsule by mouth once daily.      insulin aspart U-100 (NOVOLOG) 100 unit/mL (3 mL) InPn pen Inject 1-10 Units into the skin before meals and at bedtime as needed (Hyperglycemia).  0    insulin detemir U-100 (LEVEMIR FLEXTOUCH U-100 INSULN) 100 unit/mL (3 mL) InPn pen Inject 10 Units into the skin every evening.  0    levothyroxine (SYNTHROID) 50 MCG tablet Take 1 tablet (50 mcg total) by mouth once daily.      metoprolol succinate (TOPROL-XL) 25 MG 24 hr tablet Take 1 tablet (25 mg total) by mouth once daily. 30 tablet 0    NIFEdipine (PROCARDIA-XL) 90 MG (OSM) 24 hr tablet Take 1 tablet (90 mg total) by mouth once daily. 30 tablet 0    pantoprazole (PROTONIX) 40 MG tablet Take 1 tablet (40 mg total) by mouth once daily. 30 tablet 0    QUEtiapine (SEROQUEL) 50 MG tablet Take 1 tablet (50 mg total) by mouth 2 (two) times daily. 60 tablet 11    tamsulosin (FLOMAX) 0.4 mg Cap Take 1 capsule (0.4 mg total) by mouth once daily.      tiotropium bromide (SPIRIVA RESPIMAT) 2.5 mcg/actuation inhaler Inhale 2 puffs into the lungs once daily. Controller 4 g 0    [DISCONTINUED] albuterol (PROVENTIL) 2.5 mg /3 mL (0.083 %) nebulizer solution USE 1 VIAL VIA NEBULIZER EVERY 6 HOURS AS NEEDED 7/9/21      [DISCONTINUED] amLODIPine (NORVASC) 10 MG tablet Take 1 tablet (10 mg total) by mouth once daily. 30 tablet 11    [DISCONTINUED] furosemide (LASIX) 80 MG tablet Take 1 tablet (80 mg total) by mouth 2 (two) times daily. 60 tablet 0    [DISCONTINUED] isosorbide-hydrALAZINE 20-37.5 mg (BIDIL) 20-37.5 mg Tab Take 1 tablet by mouth 2 (two) times daily. 60 tablet 0    [DISCONTINUED] metoprolol tartrate (LOPRESSOR) 50 MG tablet Take 1 tablet (50 mg total) by mouth 2 (two) times daily. 60 tablet 11    [DISCONTINUED] mirtazapine (REMERON) 30 MG tablet Take 30 mg by mouth every evening.      [DISCONTINUED] omeprazole  (PRILOSEC) 40 MG capsule Take 1 capsule (40 mg total) by mouth once daily. 30 capsule 11    [DISCONTINUED] pioglitazone (ACTOS) 30 MG tablet Take 30 mg by mouth once daily.      [DISCONTINUED] rivastigmine (EXELON) 4.6 mg/24 hour PT24 Place 1 patch onto the skin once daily. 30 patch 11    [DISCONTINUED] SITagliptin (JANUVIA) 100 MG Tab Take 100 mg by mouth once daily.      [DISCONTINUED] traZODone (DESYREL) 50 MG tablet Take 50 mg by mouth every evening.         SCHEDULED MEDS:   arformoteroL  15 mcg Nebulization BID    aspirin  81 mg Oral Daily    atorvastatin  40 mg Oral Daily    budesonide  0.5 mg Nebulization Q12H    docusate sodium  100 mg Oral Daily    doxazosin  2 mg Oral Daily    fluvoxaMINE  25 mg Oral QHS    furosemide (LASIX) injection  40 mg Intravenous Daily    insulin detemir U-100  5 Units Subcutaneous QHS    ipratropium  0.5 mg Nebulization Q6H    levothyroxine  50 mcg Oral Before breakfast    metoprolol succinate  25 mg Oral Daily    NIFEdipine  90 mg Oral Daily    pantoprazole  40 mg Oral Before breakfast    polyethylene glycol  17 g Oral Daily    QUEtiapine  50 mg Oral BID    tamsulosin  0.4 mg Oral Daily       CONTINUOUS INFUSIONS:    PRN MEDS:clonazePAM, dextrose 10%, dextrose 10%, glucagon (human recombinant), glucose, glucose, insulin aspart U-100, ipratropium, naloxone, sodium chloride 0.9%    LABS AND DIAGNOSTICS     CBC LAST 3 DAYS  Recent Labs   Lab 01/16/23  1015 01/16/23  1807   WBC 8.03 7.16   RBC 4.31* 4.08*   HGB 10.4* 9.7*   HCT 34.0* 31.9*   MCV 79* 78*   MCH 24.1* 23.8*   MCHC 30.6* 30.4*   RDW 16.1* 16.2*    127*   MPV 10.9 10.4   GRAN 74.1*  6.0 76.9*  5.5   LYMPH 14.3*  1.2 11.6*  0.8*   MONO 9.7  0.8 9.8  0.7   BASO 0.03 0.02   NRBC 0 0         COAGULATION LAST 3 DAYS  No results for input(s): LABPT, INR, APTT in the last 168 hours.    CHEMISTRY LAST 3 DAYS  Recent Labs   Lab 01/16/23  1015 01/16/23  1047 01/16/23  1250 01/16/23  1542 01/16/23  1832 01/17/23  8489  01/18/23  0421   *  --  132*  --  136 136  136 137   K 5.7*  --  5.7*  --  4.6 4.1  4.1 4.6     --  102  --  101 104  103 103   CO2 22*  --  21*  --  23 22*  23 26   ANIONGAP 9  --  9  --  12 10  10 8   BUN 61*  --  65*  --  62* 68*  67* 71*   CREATININE 3.2*  --  3.2*  --  3.1* 3.2*  3.2* 3.0*   *  --  115*  --  135* 97  95 131*   CALCIUM 8.4*  --  8.4*  --  8.8 8.4*  8.4* 8.6*   PH  --  7.323*  --   --   --   --   --    MG  --   --   --  2.6  --   --  2.5   ALBUMIN 3.2*  --  3.2*  --   --  2.9* 2.8*   PROT 6.5  --   --   --   --  6.0 6.1   ALKPHOS 63  --   --   --   --  53* 61   ALT 22  --   --   --   --  16 19   AST 21  --   --   --   --  15 18   BILITOT 0.7  --   --   --   --  1.1* 0.9         CARDIAC PROFILE LAST 3 DAYS  Recent Labs   Lab 01/16/23  1015 01/16/23  1250 01/16/23  1542 01/17/23  0504   BNP 1,225*  --  1,118*  --    CPK  --   --  43  --    TROPONINIHS 73.7* 112.1* 143.1* 206.1*  206.1*         ENDOCRINE LAST 3 DAYS  No results for input(s): TSH, PROCAL in the last 168 hours.    LAST ARTERIAL BLOOD GAS  ABG  Recent Labs   Lab 01/16/23  1047   PH 7.323*   PO2 60*   PCO2 38.9   HCO3 20.2*   BE -6         LAST 7 DAYS MICROBIOLOGY   Microbiology Results (last 7 days)       ** No results found for the last 168 hours. **            MOST RECENT IMAGING  Echo  · The left ventricle is normal in size with moderate concentric   hypertrophy and normal systolic function.  · The estimated ejection fraction is 60%.  · Indeterminate left ventricular diastolic function.  · Normal right ventricular size with normal right ventricular systolic   function.  · Mild left atrial enlargement.  · There is severe aortic valve stenosis.  · Aortic valve area is 0.98 cm2; peak velocity is 4.2 m/s; mean gradient   is 45 mmHg.  · Mild tricuspid regurgitation.  · Normal central venous pressure (3 mmHg).  · The estimated PA systolic pressure is 16 mmHg.  · Mildly elevated mean gradient across mitral  valve with normal mitral   valve area by pressure half time.     US Retroperitoneal Complete  Renal ultrasound    HISTORY: Abnormal renal function.    The right kidney measures 10.6 cm in sagittal dimension. The left kidney measures 9.7 cm. Left upper pole renal cortical cysts measure up to 3.2 cm. There are no solid masses or hydronephrosis is observed.    A trace amount of perinephric fluid is seen about the right kidney. The midline retroperitoneum is predominantly obscured. Incidental observation is made of a small right-sided pleural effusion.    The urinary bladder is distended but otherwise demonstrates no abnormality.    IMPRESSION:    Left renal cysts.    Trace amount of right-sided perinephric free fluid    Distention of the urinary bladder.    Obscuration of the midline retroperitoneum.    Electronically signed by:  Chuy Patton MD  1/16/2023 2:05 PM CST Workstation: [id number]  X-Ray Chest AP Portable  HISTORY: Chest pain,  dyspnea and hypoxia.    FINDINGS: Portable chest radiograph at 1110 hours compared to prior exams including 09/14/2022 shows the cardiomediastinal silhouette and pulmonary vasculature are within normal limits. There are aortic vascular calcifications.    The lungs are normally expanded, with right lung base airspace opacities medially, nonspecific. There is no lobar consolidation, large pleural effusion, evidence of pulmonary edema, or pneumothorax. No acute fractures or destructive osseous lesions.    IMPRESSION: Right lung base airspace opacities, potentially atelectasis, or pneumonia in the appropriate clinical setting.    Electronically signed by:  Damon Chaves MD  1/16/2023 12:03 PM CST Workstation: [id number]      ECHOCARDIOGRAM RESULTS (last 5)  Results for orders placed during the hospital encounter of 01/16/23    Echo    Interpretation Summary  · The left ventricle is normal in size with moderate concentric hypertrophy and normal systolic function.  · The estimated ejection  fraction is 60%.  · Indeterminate left ventricular diastolic function.  · Normal right ventricular size with normal right ventricular systolic function.  · Mild left atrial enlargement.  · There is severe aortic valve stenosis.  · Aortic valve area is 0.98 cm2; peak velocity is 4.2 m/s; mean gradient is 45 mmHg.  · Mild tricuspid regurgitation.  · Normal central venous pressure (3 mmHg).  · The estimated PA systolic pressure is 16 mmHg.  · Mildly elevated mean gradient across mitral valve with normal mitral valve area by pressure half time.      Results for orders placed during the hospital encounter of 07/25/22    2D echo with color flow doppler    Narrative  Transthoracic Echocardiogram Report    Patient Name  : DON REYNOLDS  [facility]  [address] [place] [address]    Phone: [phone]    Interpreting Physician: CAMILA BALBUENA MD  Demographics:  Name:  DON REYNOLDS   YOB: 1960  Age/Sex:  62, Male   Height: 5 ft 9 in  Weight:   198 pounds    BSA: 2.11 cc/m?  BMI:      29.2   Date of Study: 07/26/2022  Medical Record No:   [id number]   Location: [facility]  Referring Physician:   CAMILA BALBUENA   Technologist: LAURIE Mesa  Study:   Trans Thoracic Echocardiogram  Study Quality:   Average  Procedure  Type: Adult TTE (Date Study Ordered : 07/26/2022)  Components: 2D,Color Flow Doppler,Doppler,M-mode,TDI(Tissue Doppler  Imaging)  Referral diagnosis  Cardiomyopathy, ischemic  Hemodynamics  Heart rate: 54 beats/min  Blood pressure:  140/65 mmHg  ECG:    Final Impressions  1. The study quality is average.  2. Global left ventricular systolic function is normal. The left  ventricular ejection fraction is 50-55%.  3. Severe aortic valve stenosis is present. Aortic valve area  continuity equation is 0.9 cm?.  4. Moderate calcification of the aortic valve is noted.  Moderate  mitral annular calcification is noted.  5. The left atrium is mildly enlarged. Left atrial diameter is  4.6  cms.  6. Moderate (2+) aortic regurgitation. Mild (1+) mitral regurgitation.  Trace tricuspid regurgitation.  7. The pulmonary artery appears normal.    Intra-modality comparison (Echocardiogram)  This echocardiogram when compared with the latest echocardiogram-TTE  ([facility]) dated 4/5/2022 shows:  1. Ejection fraction has increased from 30% to 50%.  2. Left atrium size changed from severely increased to mildly  increased and left ventricle size changed from mildly increased to  normal is noted in the current study.  3. Mitral valve area by pressure halftime essentially remained  unchanged (2.3 cm? previous study, 2 cm? current study).    Findings    Left Atrium  The left atrium is mildly enlarged. Left atrial diameter is 4.6 cms.    Right Atrium  The right atrium is normal.    Left Ventricle  The left ventricle is normal in size. Left ventricular diastolic  dimension is 5.1 cms. Left ventricular systolic dimension is 3.6 cms.  Left ventricular diastolic septal thickness is 1.5 cms. Left  ventricular diastolic postero basal free wall thickness is 1.5 cms.  Global left ventricular systolic function is borderline normal. The  left ventricular fractional shortening is 29.5%. The left ventricular  ejection fraction is 50%. Left ventricular outflow tract diameter is 2  cms. Left ventricular outflow tract VTI is 24.8 cms. Left ventricular  outflow tract mean velocity is 0.7 m/s. Left ventricular mean gradient  is 2 mmHg.    Right Ventricle  Right ventricular systolic function is normal. Right ventricular  diastolic dimension is 3.8 cms.    Atrial Septum  The atrial septum is normal.    Ventricular Septum  The ventricular septum is normal.    Pulmonary Vein  The pulmonary vein appears normal.    IVC  The inferior vena cava is normal.    Pulmonic Valve  The peak velocity is 0.8 m/s. The mean velocity is 0.7 m/s. The peak  trans pulmonic gradient is 3 mmHg. The mean trans pulmonic gradient is  2 mmHg.    Tricuspid  Valve  Evidence of tricuspid regurgitation is present. Trace tricuspid  regurgitation.    Mitral Valve  Moderate mitral annular calcification is noted. Evidence of mitral  stenosis is noted. The area by pressure half time is 2 cm?. The mean  trans mitral gradient is 2 mmHg. Mitral regurgitation is noted. Mild  (1+) mitral regurgitation. The pressure half time is 108 ms. The  deceleration time is 367 ms. The E velocity is 1 m/s. The A velocity  is 1 m/s.    Aortic Valve  Noted evidence of aortic stenosis. Severe aortic valve stenosis is  present. Aortic valve area continuity equation is 0.9 cm?. Noted  evidence of aortic regurgitation. Moderate (2+) aortic regurgitation.  The trans-aortic peak velocity is 3.1 m/s. The trans-aortic peak  gradient is 40 mmHg. The trans-aortic mean velocity is 2.2 m/s. The  trans-aortic mean gradient is 23 mmHg. The regurgitation pressure half  time is 550 ms. Aortic valve VTI measures 82.5 cms. Moderate  calcification of the aortic valve is noted.  LVOT Diameter is 2 cms.    Aorta  Aortic root diameter is 3.1 cms.    Pericardium  The pericardium is normal in appearance.    Measurements  Left Atrium  Diameter   4.6cm(s)  Volume   50.7ml  Volume Index   24ml/m?    Left Ventricle  End Diastolic Dimension   5.1cm(s)  End Systolic Dimension   3.6cm(s)  Posterior Basal Free Wall Thickness   1.5cm(s)  End Diastolic Septal Thickness   1.5cm(s)  Ejection Fraction   50%  Fractional Shortening   29.5    Right Ventricle  Diastolic Diameter   3.8cm(s)    Pulmonic Valve  Peak Pulmonic Velocity   0.8m/s  Mean Pulmonic Velocity   0.7m/s  Peak Trans Pulmonic Gradient   3mmHg  Mean Trans Pulmonic Gradient   2mmHg    Mitral Valve  Pressure Half Time   108ms  Deceleration Time   367ms  A Velocity   1m/s  E Velocity   1m/s  Mean Trans Mitral Gradient   2mmHg  Area By Pressure Half Time   2cm?    Aortic Valve  Trans Aortic Peak Velocity   3.1m/s  Trans Aortic Mean Velocity   2.2m/s  Trans Aortic Peak  Gradient   40mmHg  Trans Aortic Mean Gradient   23mmHg  AV Area Indexed To BSA   0.4cm?/m?  Area By Continuity Equation   0.9cm?  Regurgitation Pressure Half Time   550ms  Aortic Valve VTI   82.5cm(s)  LVOT Diameter   2cm(s)        Electronically Authenticated by  CAMILA BALBUENA MD  07/26/2022 , 11:29      Results for orders placed during the hospital encounter of 04/04/22    2D echo with color flow doppler    Narrative  Transthoracic Echocardiogram Report    Patient Name  : DON REYNOLDS  [address] [address]    Phone: [phone]    Interpreting Physician: ARGENIS SPRINGER MD  Demographics:  Name:  DON REYNOLDS   YOB: 1960  Age/Sex:  61, Male   Height: 5 ft 11 in  Weight:   210 pounds    BSA: 2.21 cc/m?  BMI:      29.3   Date of Study: 04/05/2022  Medical Record No:   [id number]   Location: [facility]  Referring Physician:   JOE FRANCO   Technologist: Tracey SULLIVAN  Study:   Trans Thoracic Echocardiogram  Study Quality:   Good  Procedure  Type: Adult TTE (Date Study Ordered : 04/05/2022)  Components: 2D,Color Flow Doppler,Doppler,M-mode,TDI(Tissue Doppler  Imaging)  Referral diagnosis  CHF  Hemodynamics  Heart rate: 0 beats/min  Blood pressure:  ECG: Normal Sinus Rhythm    Final Impressions  1. The study quality is good.  2. The left ventricle is mildly enlarged. Global left ventricular  systolic function is severely decreased. The left ventricular ejection  fraction is 30%. Left ventricular diastolic function is abnormal  (stage I impaired relaxation).  3. The left atrium is severely enlarged.  4. The aortic valve is tricuspid. Noted evidence of aortic stenosis.  Moderate aortic valve stenosis is present. Aortic valve area  continuity equation is 1.47 cm?. Noted evidence of aortic  regurgitation. Moderate (2+) aortic regurgitation. The trans-aortic  mean gradient is 27 mmHg.  5. The right ventricle is normal in size. Right ventricular  systolic  function is normal.    Intra-modality comparison (Echocardiogram)  This echocardiogram when compared with the latest echocardiogram-TTE  ([facility]) dated 7/9/2021 shows:  1. Ejection fraction has increased from 20% to 30%.  2. Left ventricle size changed from normal to mildly increased is  noted in the current study.  3. Mitral valve area by pressure halftime has decreased from 3.7 cm?  to 2.3 cm?.    Findings    Left Atrium  The left atrium is severely enlarged.    Right Atrium  The right atrium is normal.    Left Ventricle  The left ventricle is mildly enlarged. Left ventricular diastolic  dimension is 6.1 cms. Left ventricular systolic dimension is 5.3 cms.  Left ventricular diastolic septal thickness is 1 cm. Left ventricular  diastolic postero basal free wall thickness is 1 cm. Global left  ventricular systolic function is severely decreased. The left  ventricular fractional shortening is 13.4%. The left ventricular  ejection fraction is 30%. Left ventricular diastolic function is  abnormal (stage I impaired relaxation). Left ventricular outflow tract  diameter is 2.1 cms. Left ventricular outflow tract VTI is 27.4 cms.  Left ventricular outflow tract mean velocity is 1 m/s. Left  ventricular mean gradient is 5 mmHg.    Right Ventricle  The right ventricle is normal in size. Right ventricular systolic  function is normal. Right ventricular diastolic dimension is 2.4 cms.    Atrial Septum  The atrial septum is normal.    Ventricular Septum  The ventricular septum is normal.    Pulmonary Vein  The pulmonary vein appears normal.    IVC  The inferior vena cava is normal.    Pulmonic Valve  The mean velocity is 0.8 m/s. The mean trans pulmonic gradient is 3  mmHg.    Tricuspid Valve  Evidence of tricuspid regurgitation is present. Trace tricuspid  regurgitation.    Mitral Valve  Mitral regurgitation is noted. Mild (1+) mitral regurgitation. The  pressure half time is 95 ms. The deceleration time is 323 ms. The  E  velocity is 1.1 m/s. The A velocity is 1.4 m/s. Mild calcification of  the mitral subvalvular apparatus is noted.    Aortic Valve  The aortic valve is tricuspid. Noted evidence of aortic stenosis.  Moderate aortic valve stenosis is present. Aortic valve area  continuity equation is 1.47 cm?. Noted evidence of aortic  regurgitation. Moderate (2+) aortic regurgitation. The trans-aortic  mean velocity is 2.5 m/s. The trans-aortic mean gradient is 27 mmHg.  The regurgitation pressure half time is 331 ms. Aortic valve VTI  measures 57.5 cms. Moderate calcification of the aortic valve is  noted.  LVOT Diameter is 2.1 cms.    Aorta  Aortic root diameter is 3.4 cms.    Pericardium  The pericardium is normal in appearance.    Measurements  Left Atrium  Volume   88ml  Volume Index   39.8ml/m?    Left Ventricle  End Diastolic Dimension   6.1cm(s)  End Systolic Dimension   5.3cm(s)  Posterior Basal Free Wall Thickness   1cm(s)  End Diastolic Septal Thickness   1cm(s)  Ejection Fraction   30%  Fractional Shortening   13.4    Right Ventricle  Diastolic Diameter   2.4cm(s)    Pulmonic Valve  Mean Pulmonic Velocity   0.8m/s  Mean Trans Pulmonic Gradient   3mmHg    Mitral Valve  Pressure Half Time   95ms  Deceleration Time   323ms  A Velocity   1.4m/s  E Velocity   1.1m/s  Area By Pressure Half Time   2.3cm?    Aortic Valve  Trans Aortic Mean Velocity   2.5m/s  Trans Aortic Mean Gradient   27mmHg  AV Area Indexed To BSA   0.7cm?/m?  Area By Continuity Equation   1.47cm?  Regurgitation Pressure Half Time   331ms  Aortic Valve VTI   57.5cm(s)  LVOT Diameter   2.1cm(s)        Electronically Authenticated by  ARGENIS SPRINGER MD  04/05/2022 , 21:09      Results for orders placed during the hospital encounter of 07/08/21    2D echo with color flow doppler    Narrative  Transthoracic Echocardiogram Report    Patient Name  : DON REYNOLDS  [facility]  [address] [place] [address]    Phone: [phone]    Interpreting Physician: CAMILA BALBUENA MD  Demographics:  Name:  DON REYNOLDS   YOB: 1960  Age/Sex:  61, Male   Height: 5 ft 8 in  Weight:   287 pounds    BSA: 2.56 cc/m?  BMI:      43.6   Date of Study: 07/09/2021  Medical Record No:   [id number]   Location: 464  Referring Physician:   ELODIA FISCHER   Technologist: Edna Ortiz RDCS,RVT  Study:   Trans Thoracic Echocardiogram  Study Quality:   Average  Procedure  Type: Adult TTE (Date Study Ordered : 07/09/2021)  Components: 2D,Color Flow Doppler,Doppler,M-mode,TDI(Tissue Doppler  Imaging)  Referral diagnosis  CHF  Hemodynamics  Heart rate: 123 beats/min  Blood pressure:  118/66 mmHg  ECG:    Final Impressions  1. The study quality is average.  2. Global left ventricular systolic function is severely decreased.  The left ventricular ejection fraction is 20%. Optison was attempted  but IV was not accessible. Can repeat with optison if needed once new  IV is in place.  3. Left ventricular diastolic function is iabnormal. Stage III  restrictive pattern  4. The left atrium is severely enlarged. Left atrial diameter is 5.5  cms.  5. Right ventricular systolic function is severely decreased. Right  ventricular diastolic dimension is 4.2 cms. Right ventricular systolic  pressure is 58 mmHg. TAPSE measures 1.2cm.  6. Evidence of mitral stenosis is noted. The area by pressure half  time is 3.7 cm?. The mean trans mitral gradient is 5 mmHg.  7. Moderate to severe aortic valve stenosis is present. Aortic valve  area continuity equation is 1.2 cm?. The trans-aortic peak velocity is  3.6 m/s. The trans-aortic peak gradient is 52 mmHg. The trans-aortic  mean velocity is 2.1 m/s. The trans-aortic mean gradient is 22 mmHg.  Aortic valve VTI measures 61.4 cms. Severe calcification of the aortic  valve is noted.  LVOT Diameter is 2.1 cms.  8. Mild (1+) aortic regurgitation. Mild (1+) mitral regurgitation.  Mild (1+) tricuspid regurgitation.  9. The  pulmonary artery systolic pressure is 58 mmHg. Evidence of  pulmonary hypertension is noted.  10. A small pericardial effusion is noted. It is an anterior and  posterior pericardial effusion. The pericardial effusion thickness is  1.8 cms.    Findings    Left Atrium  The left atrium is severely enlarged. Left atrial diameter is 5.5 cms.    Right Atrium  The right atrium is normal in size. Right atrial diameter is 4.4 cms.    Left Ventricle  The left ventricle is normal in size. Left ventricular diastolic  dimension is 5.1 cms. Left ventricular systolic dimension is 4.1 cms.  Left ventricular diastolic septal thickness is 1.4 cms. Left  ventricular diastolic postero basal free wall thickness is 1.7 cms.  Global left ventricular systolic function is severely decreased. The  left ventricular ejection fraction is 20%. Left ventricular diastolic  function is indeterminate. Left ventricular outflow tract VTI is 23.9  cms. Left ventricular outflow tract mean velocity is 0.7 m/s. Left  ventricular mean gradient is 3 mmHg.    Right Ventricle  Right ventricular systolic function is severely decreased. Right  ventricular diastolic dimension is 4.2 cms. Right ventricular systolic  pressure is 58 mmHg. TAPSE measures 1.2cm.    Atrial Septum  The atrial septum is normal.    Ventricular Septum  The ventricular septum is normal.    Pulmonary Artery  The pulmonary artery systolic pressure is 58 mmHg. Evidence of  pulmonary hypertension is noted.    Pulmonary Vein  The pulmonary vein appears normal.    IVC  The inferior vena cava is normal.    Pulmonic Valve  Good excursion of the pulmonic valve cusps is noted. The peak velocity  is 1 m/s. The mean velocity is 0.7 m/s. The peak trans pulmonic  gradient is 4 mmHg. The mean trans pulmonic gradient is 2 mmHg.    Tricuspid Valve  Evidence of tricuspid regurgitation is present. Mild (1+) tricuspid  regurgitation. The peak tricuspid regurgitant velocity is 3.5 m/s. The  peak trans  tricuspid gradient is 50 mmHg.    Mitral Valve  Mobility of the anterior mitral leaflet is mildly decreased. Mobility  of the posterior mitral leaflet is mildly decreased. Mild  calcification of the mitral valve is noted. Evidence of mitral  stenosis is noted. The area by pressure half time is 3.7 cm?. The mean  trans mitral gradient is 5 mmHg. Mitral regurgitation is noted. Mild  (1+) mitral regurgitation. The pressure half time is 59 ms. The  deceleration time is 203 ms. The E velocity is 1.7 m/s. The A velocity  is 0.6 m/s.    Aortic Valve  The aortic valve is tricuspid. Noted evidence of aortic stenosis.  Moderate to severe aortic valve stenosis is present. Aortic valve area  continuity equation is 1.2 cm?. Noted evidence of aortic  regurgitation. Mild (1+) aortic regurgitation. The trans-aortic peak  velocity is 3.6 m/s. The trans-aortic peak gradient is 52 mmHg. The  trans-aortic mean velocity is 2.1 m/s. The trans-aortic mean gradient  is 22 mmHg. Aortic valve VTI measures 61.4 cms. Severe calcification  of the aortic valve is noted.  LVOT Diameter is 2.1 cms.    Aorta  Aortic root diameter is 2.7 cms. The aortic arch is normal.    Pericardium  A small pericardial effusion is noted. It is an anterior and posterior  pericardial effusion. The pericardial effusion thickness is 1.8 cms.    Measurements  Left Atrium  Diameter   5.5cm(s)  Volume   83.9ml  Volume Index   32.8ml/m?    Right Atrium  Diameter   4.4cm(s)    Left Ventricle  End Diastolic Dimension   5.1cm(s)  End Systolic Dimension   4.1cm(s)  Posterior Basal Free Wall Thickness   1.7cm(s)  End Diastolic Septal Thickness   1.4cm(s)  Ejection Fraction   20%    Right Ventricle  Diastolic Diameter   4.2cm(s)    Pulmonic Valve  Peak Pulmonic Velocity   1m/s  Mean Pulmonic Velocity   0.7m/s  Peak Trans Pulmonic Gradient   4mmHg  Mean Trans Pulmonic Gradient   2mmHg    Tricuspid Valve  Peak Tricuspid Regurgitant Velocity   3.5m/s  Peak Tricuspid Regurgitant  Gradient   50mmHg  Pulmonary Artery Systolic Pressure  58mmHg    Mitral Valve  Pressure Half Time   59ms  Deceleration Time   203ms  A Velocity   0.6m/s  E Velocity   1.7m/s  Mean Trans Mitral Gradient   5mmHg  Area By Pressure Half Time   3.7cm?    Aortic Valve  Trans Aortic Peak Velocity   3.6m/s  Trans Aortic Mean Velocity   2.1m/s  Trans Aortic Peak Gradient   52mmHg  Trans Aortic Mean Gradient   22mmHg  AV Area Indexed To BSA   0.5cm?/m?  Area By Continuity Equation   1.2cm?  Aortic Valve VTI   61.4cm(s)  LVOT Diameter   2.1cm(s)    Pericardium  Pericardial Effusion Length   1.8cm(s)        Electronically Authenticated by  CAMILA BALBUENA MD  07/09/2021 , 10:05      Results for orders placed during the hospital encounter of 07/03/19    ECHO REPORT      CURRENT/PREVIOUS VISIT EKG  Results for orders placed or performed during the hospital encounter of 01/16/23   EKG 12-lead    Collection Time: 01/16/23  3:50 PM    Narrative    Test Reason : R77.8,    Vent. Rate : 074 BPM     Atrial Rate : 074 BPM     P-R Int : 154 ms          QRS Dur : 112 ms      QT Int : 396 ms       P-R-T Axes : 056 -10 108 degrees     QTc Int : 439 ms    Sinus rhythm with Premature atrial complexes  Septal infarct (cited on or before 16-JAN-2023)  T wave abnormality, consider lateral ischemia  Abnormal ECG  When compared with ECG of 16-JAN-2023 10:25,  Premature atrial complexes are now Present  Serial changes of Septal infarct Present    Referred By: [email or web address]   SELF           Confirmed By:            ASSESSMENT/PLAN:     Active Hospital Problems    Diagnosis    *Acute on chronic combined systolic and diastolic CHF, NYHA class 1    Hyponatremia    Acute respiratory failure with hypoxia    Type 2 diabetes mellitus with neurologic complication, with long-term current use of insulin    Steroid-induced hyperglycemia    MDD (major depressive disorder), recurrent episode, moderate    Microcytic anemia    Vascular dementia with delirium     Methamphetamine / cocaine dependence    Hypothyroid    CAD (coronary artery disease)    Hyperkalemia    Stage 3 severe COPD by GOLD classification    Hypertension    AYDEE on CPAP       ASSESSMENT & PLAN:     Acute on chronic combined systolic and diastolic heart failure  Acute respiratory failure with hypoxia  Severe aortic stenosis  BREA on CKD  COPD  Illicit drug use : positive for amphetamines      RECOMMENDATIONS:    Hold diuresis for now as he appears euvolemic. Can give furosemide 20 mg po daily PRN for outpatient diuresis.   Recommend close follow up with Cardiology to further discuss potential procedures.   Repeat trop for completeness.   Will follow while inpatient.         Kimber Parrish NP  [facility]  Department of Cardiology  Date of Service: 01/18/2023  4:37 PM \    I have personally interviewed and examined the patient, I have reviewed the Nurse Practitioner's history and physical, assessment, and plan. I have personally evaluated the patient at bedside and agree with the findings and made appropriate changes as necessary in recommendations.  Agree with above.  Patient needs to have outpatient follow-up on the heart valve which he reports he is had since birth.  His possibly has a bicuspid aortic valve but needs further evaluation    Corwin Fisher MD  Department of Cardiology  [facility]  01/18/2023 4:44 PM

## 2023-01-19 NOTE — PROGRESS NOTES
[facility] Medicine  Progress Note    Patient name: Pam Gray  MRN: [id number]  Admit Date: 1/16/2023   LOS: 0 days     SUBJECTIVE:     Principal problem: Acute on chronic combined systolic and diastolic CHF, NYHA class 1    Interval History:      1/18- more talkative today.  Sitter in room.  Plan to return to [facility] pending further cardiology w/u for AS and poss isch heart disease    1/17- Patient is currently roomed in hallway, denies chest pain.  Shortness of breath has improved.  Was at [facility] without oxygen, baseline req is 2LNC.  Difficult historian.  Follow up cards recs.     Scheduled Meds:   arformoteroL  15 mcg Nebulization BID    aspirin  81 mg Oral Daily    atorvastatin  40 mg Oral Daily    budesonide  0.5 mg Nebulization Q12H    docusate sodium  100 mg Oral Daily    doxazosin  2 mg Oral Daily    fluvoxaMINE  25 mg Oral QHS    furosemide (LASIX) injection  40 mg Intravenous Daily    insulin detemir U-100  5 Units Subcutaneous QHS    ipratropium  0.5 mg Nebulization Q6H    levothyroxine  50 mcg Oral Before breakfast    metoprolol succinate  25 mg Oral Daily    NIFEdipine  90 mg Oral Daily    pantoprazole  40 mg Oral Before breakfast    polyethylene glycol  17 g Oral Daily    QUEtiapine  50 mg Oral BID    tamsulosin  0.4 mg Oral Daily     Continuous Infusions:  PRN Meds:clonazePAM, dextrose 10%, dextrose 10%, glucagon (human recombinant), glucose, glucose, insulin aspart U-100, ipratropium, naloxone, sodium chloride 0.9%    Review of patient's allergies indicates:   Allergen Reactions    Onion Other (See Comments)     THROAT SWELLS    Pork/porcine containing products      Anabaptism PREFERENCE    Shellfish containing products      Anabaptism PREFERENCE      Shrimp      Anabaptism PREFERENCE         Review of Systems: As per interval history    OBJECTIVE:     Vital Signs (Most Recent)  Temp: 98 °F (36.7 °C) (01/18/23 1930)  Pulse: 63 (01/18/23 2015)  Resp: (!) 21 (01/18/23  2015)  BP: (!) 116/59 (01/18/23 2000)  SpO2: (!) 94 % (01/18/23 2015)    Vital Signs Range (Last 24H):  Temp:  [98 °F (36.7 °C)]   Pulse:  [60-77]   Resp:  [11-36]   BP: ()/(50-77)   SpO2:  [75 %-98 %]     I & O (Last 24H):  Intake/Output Summary (Last 24 hours) at 1/18/2023 2051  Last data filed at 1/18/2023 1532  Gross per 24 hour   Intake 390 ml   Output 1275 ml   Net -885 ml         Physical Exam:  General: Patient resting eyes closed, arouses easily  Eyes: No conjunctival injection. No scleral icterus.  ENT: Hearing grossly intact. No discharge from ears. No nasal discharge.   CVS: RRR. trace LE edema BL  Lungs:  No tachypnea or accessory muscle use.  Clear to auscultation bilaterally  Abdomen:  Soft, nontender and nondistended.  No organomegaly  Neuro: Alert. Speech is slow and slurred. Moves all extremities. Follows commands.       Laboratory:  I have reviewed all pertinent lab results within the past 24 hours.    Diagnostic Results:  Labs: Reviewed  ECG: Reviewed  X-Ray: Reviewed  Echo: Reviewed    ASSESSMENT/PLAN:     Shortness of breath  COPD  HFpEF  Severe aortic stenosis  Restart home controllers for copd- pt refusing  Duonebs prn  Continue IV lasix 40 mg daily  Echo w/EF 60%, suspect some degree of diastolic dysfx as well as severe AS  Cardiology consulted  Considering possible SAVR vs TAVR, but would be fairly high risk    BREA/CKD  Hyperkalemia   Added lokelma  Nephro consulted  Diuresing with lasix IV  Creat slightly improved 3.2 >3    Dispo  Plan is back to [facility] when medically appropriate     Active Hospital Problems    Diagnosis  POA    *Acute on chronic combined systolic and diastolic CHF, NYHA class 1 [I50.43]  Yes    Hyponatremia [E87.1]  Yes    Acute respiratory failure with hypoxia [J96.01]  Yes    Type 2 diabetes mellitus with neurologic complication, with long-term current use of insulin [E11.49, Z79.4]  Not Applicable    Steroid-induced hyperglycemia [R73.9, T38.0X5A]  Yes     MDD (major depressive disorder), recurrent episode, moderate [F33.1]  Yes    Microcytic anemia [D50.9]  Yes    Vascular dementia with delirium [F01.50, F05]  Yes    Methamphetamine / cocaine dependence [F15.20]  Yes    Hypothyroid [E03.9]  Yes    CAD (coronary artery disease) [I25.10]  Yes    Hyperkalemia [E87.5]  Yes    Stage 3 severe COPD by GOLD classification [J44.9]  Yes    Hypertension [I10]  Yes    AYDEE on CPAP [G47.33, Z99.89]  Not Applicable      Resolved Hospital Problems   No resolved problems to display.               VTE Risk Mitigation (From admission, onward)           Ordered     Reason for No Pharmacological VTE Prophylaxis  Once        Question:  Reasons:  Answer:  Physician Provided (leave comment)    01/16/23 1353     Place RADHA hose  Until discontinued         01/16/23 1353     IP VTE HIGH RISK PATIENT  Once         01/16/23 1353     Place sequential compression device  Until discontinued         01/16/23 1353                        Department Hospital Medicine  [facility]  Luan Liao MD

## 2023-01-19 NOTE — CONSULTS
[facility]  Department of Cardiology  Consult Note      PATIENT NAME: Pam Gray    MRN: [id number]  TODAY'S DATE: 2023  ADMIT DATE: 2023                          CONSULT REQUESTED BY: Chun Oliver MD    SUBJECTIVE     PRINCIPAL PROBLEM: Acute on chronic combined systolic and diastolic CHF, NYHA class 1    23:  Patient seen resting in bed. No distress noted. Breathing is stable while lying near flat.    23:  Patient seen today resting in bed. He is more talkative and reveals that he has seen cardiology in the past. He states he lives in [place], [place] and would like to follow up there. Nurse reports he does not have outpatient support or resources. He states his wife  recently.     REASON FOR CONSULT:    From H&P: Pam Gray is a 62 y.o. male with a history as  has a past medical history of Anxiety, Arthritis, CHF (congestive heart failure), Chronic kidney disease, COPD (chronic obstructive pulmonary disease), Coronary artery disease, Diabetes mellitus, Erectile dysfunction, Hypertension, Necrotizing fasciitis of forearm, Peripheral neuropathy, Stroke, and Thyroid disease. who presented to the ED with a Shortness of Breath (Pt coming from [facility] complaining of SOB. Pt was 79% on room air. Pt states that he is suppose to be on oxygen at all times. Received duo nebulizer en route to ed.)     Patient seen today in the ED observe on BiPAP on a 40% of oxygen able to carry on conversation report loss wife 2 months ago. He was recently admitted  to [facility] approximately a week ago and states he was without his oxygen, however endorses oxygen dependent (on home oxygen at 2 L per nasal cannula) for COPD. Report low energy.  He also states he did feel short of breath but was told his oxygen levels were low today.  Patient also reports smoking some type of synthetic (illegal drug).      Denies fever, chills, diaphoresis, dizziness, HA, chest  pain, palpitations, NVD, recent trauma or any other associated symptoms.  Does smoke cigarettes, drink or do illegal drugs.      Lab and imaging obtained and reviewed CBC showed WBC 8.0, H/H 10.4/34.0, Cl 1 MCH 24.1,RDW 16.1 .  Chemistry profile shows sodium 132,  potassium 5.7, BUN 61, creatinine 3.2, eGFR 21.1 . BNP 1,225 with Troponin High sensitivity 73.7 EKG shows NSR on admit . 98.8 respiratory 18, b/p 125/58 SpO2 88%     CXR  IMPRESSION  Right lung base airspace opacities, potentially atelectasis, or pneumonia in the appropriate clinical setting.      Echo 7/25/22  1. The study quality is average.   2. Global left ventricular systolic function is normal. The left   ventricular ejection fraction is 50-55%.   3. Severe aortic valve stenosis is present. Aortic valve area   continuity equation is 0.9 cm?.     4. Moderate calcification of the aortic valve is noted.  Moderate   mitral annular calcification is noted.     5. The left atrium is mildly enlarged. Left atrial diameter is 4.6   cms.     6. Moderate (2+) aortic regurgitation. Mild (1+) mitral regurgitation.   Trace tricuspid regurgitation.     7. The pulmonary artery appears normal.      Per ED provider patient presented to the ED with a complaint of shortness of breath history of CKD CHF COPD with O2 at home. patient recently have a suicide ideation was admitted in the [facility] for 5 to 7days w/o oxygen . In the ER Spo2 in the 70's on room air was put on 5 L oxygen trend up , currently BiPAP. Elevated BUN/CR 61/ 33 with baseline Cr 2. acute on chronic kidney disease nephrology was consulted.         HPI:    Patient is a 62-year-old male who presented to the emergency room with complaints of shortness of breath and hypoxia.  He was brought in from [facility].  Patient initially required BiPAP but is now transitioned to nasal cannula.  Patient has been treated for COPD and also smoking some sort of synthetic illegal drugs.   Chest x-ray shows right lung opacities and possible pneumonia.  Patient is also noted to have severe aortic stenosis on echocardiogram.    Unfortunately the patient is very drowsy today and did not answer many questions in the emergency room when evaluated.        Review of patient's allergies indicates:   Allergen Reactions    Onion Other (See Comments)     THROAT SWELLS    Pork/porcine containing products      Restorationism PREFERENCE    Shellfish containing products      Restorationism PREFERENCE      Shrimp      Restorationism PREFERENCE         Past Medical History:   Diagnosis Date    Anxiety     Arthritis     CHF (congestive heart failure)     Chronic kidney disease     COPD (chronic obstructive pulmonary disease)     Coronary artery disease     Diabetes mellitus     Erectile dysfunction     Hypertension     Necrotizing fasciitis of forearm     Peripheral neuropathy     Stroke     TIA    Thyroid disease      Past Surgical History:   Procedure Laterality Date    arm surgery Right     arthroscopy Right     knee    COLONOSCOPY N/A 7/3/2019    Procedure: COLONOSCOPY;  Surgeon: Jagdish Pollock MD;  Location: [facility] ENDO;  Service: Endoscopy;  Laterality: N/A;    ESOPHAGOGASTRODUODENOSCOPY N/A 7/3/2019    Procedure: ESOPHAGOGASTRODUODENOSCOPY (EGD);  Surgeon: Jagdish Pollock MD;  Location: [facility] ENDO;  Service: Endoscopy;  Laterality: N/A;    EYE SURGERY      FUSION, SPINE, POSTERIOR APPROACH N/A 7/29/2022    Procedure: FUSION,SPINE,POSTERIOR APPROACH, T9-L3;  Surgeon: Luis E Lawson MD;  Location: [facility] OR;  Service: Orthopedics;  Laterality: N/A;    ROTATOR CUFF REPAIR Right     TONSILLECTOMY      VERTEBRAL CORPECTOMY N/A 7/27/2022    Procedure: CORPECTOMY T12;  Surgeon: Luis E Lawson MD;  Location: [facility] OR;  Service: Orthopedics;  Laterality: N/A;     Social History     Tobacco Use    Smoking status: Every Day     Packs/day: 1.00     Years: 40.00     Pack years: 40.00     Types: Cigarettes    Smokeless tobacco:  Never   Substance Use Topics    Alcohol use: Not Currently    Drug use: Not Currently        REVIEW OF SYSTEMS    RESPIRATORY: Negative for cough, +shortness of breath   CARDIOVASCULAR: Negative for chest pain. Negative for palpitations and leg swelling.       OBJECTIVE     VITAL SIGNS (Most Recent)  Temp: 97.4 °F (36.3 °C) (01/19/23 1109)  Pulse: 68 (01/19/23 0926)  Resp: 15 (01/19/23 0734)  BP: (!) 112/54 (01/19/23 0926)  SpO2: 98 % (01/19/23 0734)    VENTILATION STATUS  Resp: 15 (01/19/23 0734)  SpO2: 98 % (01/19/23 0734)       I & O (Last 24H):  Intake/Output Summary (Last 24 hours) at 1/19/2023 1506  Last data filed at 1/19/2023 0945  Gross per 24 hour   Intake 120 ml   Output 1275 ml   Net -1155 ml         WEIGHTS  Wt Readings from Last 1 Encounters:   01/17/23 1715 84.1 kg (185 lb 8 oz)   01/16/23 1011 90.7 kg (200 lb)       PHYSICAL EXAM  CONSTITUTIONAL: No fever, no chills  HEENT: Normocephalic, atraumatic,pupils reactive to light                 NECK:  No JVD no carotid bruit  CVS: S1S2+, RRR, + murmur  LUNGS: Clear  ABDOMEN: Soft, NT, BS+  EXTREMITIES: No cyanosis, edema  : No burr catheter  NEURO: AAO X 3  PSY: Normal affect      HOME MEDICATIONS:  No current facility-administered medications on file prior to encounter.     Current Outpatient Medications on File Prior to Encounter   Medication Sig Dispense Refill    albuterol-ipratropium (DUO-NEB) 2.5 mg-0.5 mg/3 mL nebulizer solution Take 3 mLs by nebulization every 6 (six) hours while awake. Rescue 270 mL 0    aspirin 81 MG Chew Take 1 tablet (81 mg total) by mouth once daily. 30 tablet 0    atorvastatin (LIPITOR) 40 MG tablet Take 1 tablet (40 mg total) by mouth once daily. 30 tablet 0    clonazePAM (KLONOPIN) 0.5 MG tablet Take 0.5 mg by mouth 2 (two) times daily as needed for Anxiety.      doxazosin (CARDURA) 2 MG tablet Take 1 tablet (2 mg total) by mouth once daily. 30 tablet 0    fluticasone furoate-vilanteroL (BREO) 100-25 mcg/dose diskus  inhaler Inhale 1 puff into the lungs once daily. Controller 30 each 0    fluvoxaMINE (LUVOX) 25 MG tablet Take 1 tablet (25 mg total) by mouth every evening. 90 tablet 1    gabapentin (NEURONTIN) 300 MG capsule Take 1 capsule by mouth once daily.      insulin aspart U-100 (NOVOLOG) 100 unit/mL (3 mL) InPn pen Inject 1-10 Units into the skin before meals and at bedtime as needed (Hyperglycemia).  0    insulin detemir U-100 (LEVEMIR FLEXTOUCH U-100 INSULN) 100 unit/mL (3 mL) InPn pen Inject 10 Units into the skin every evening.  0    levothyroxine (SYNTHROID) 50 MCG tablet Take 1 tablet (50 mcg total) by mouth once daily.      metoprolol succinate (TOPROL-XL) 25 MG 24 hr tablet Take 1 tablet (25 mg total) by mouth once daily. 30 tablet 0    NIFEdipine (PROCARDIA-XL) 90 MG (OSM) 24 hr tablet Take 1 tablet (90 mg total) by mouth once daily. 30 tablet 0    pantoprazole (PROTONIX) 40 MG tablet Take 1 tablet (40 mg total) by mouth once daily. 30 tablet 0    QUEtiapine (SEROQUEL) 50 MG tablet Take 1 tablet (50 mg total) by mouth 2 (two) times daily. 60 tablet 11    tamsulosin (FLOMAX) 0.4 mg Cap Take 1 capsule (0.4 mg total) by mouth once daily.      tiotropium bromide (SPIRIVA RESPIMAT) 2.5 mcg/actuation inhaler Inhale 2 puffs into the lungs once daily. Controller 4 g 0    [DISCONTINUED] albuterol (PROVENTIL) 2.5 mg /3 mL (0.083 %) nebulizer solution USE 1 VIAL VIA NEBULIZER EVERY 6 HOURS AS NEEDED 7/9/21      [DISCONTINUED] amLODIPine (NORVASC) 10 MG tablet Take 1 tablet (10 mg total) by mouth once daily. 30 tablet 11    [DISCONTINUED] furosemide (LASIX) 80 MG tablet Take 1 tablet (80 mg total) by mouth 2 (two) times daily. 60 tablet 0    [DISCONTINUED] isosorbide-hydrALAZINE 20-37.5 mg (BIDIL) 20-37.5 mg Tab Take 1 tablet by mouth 2 (two) times daily. 60 tablet 0    [DISCONTINUED] metoprolol tartrate (LOPRESSOR) 50 MG tablet Take 1 tablet (50 mg total) by mouth 2 (two) times daily. 60 tablet 11    [DISCONTINUED]  mirtazapine (REMERON) 30 MG tablet Take 30 mg by mouth every evening.      [DISCONTINUED] omeprazole (PRILOSEC) 40 MG capsule Take 1 capsule (40 mg total) by mouth once daily. 30 capsule 11    [DISCONTINUED] pioglitazone (ACTOS) 30 MG tablet Take 30 mg by mouth once daily.      [DISCONTINUED] rivastigmine (EXELON) 4.6 mg/24 hour PT24 Place 1 patch onto the skin once daily. 30 patch 11    [DISCONTINUED] SITagliptin (JANUVIA) 100 MG Tab Take 100 mg by mouth once daily.      [DISCONTINUED] traZODone (DESYREL) 50 MG tablet Take 50 mg by mouth every evening.         SCHEDULED MEDS:   arformoteroL  15 mcg Nebulization BID    aspirin  81 mg Oral Daily    atorvastatin  40 mg Oral Daily    budesonide  0.5 mg Nebulization Q12H    chlorhexidine  15 mL Mouth/Throat BID    docusate sodium  100 mg Oral Daily    doxazosin  2 mg Oral Daily    fluvoxaMINE  25 mg Oral QHS    insulin detemir U-100  5 Units Subcutaneous QHS    ipratropium  0.5 mg Nebulization Q6H WAKE    levothyroxine  50 mcg Oral Before breakfast    metoprolol succinate  25 mg Oral Daily    mupirocin   Nasal BID    NIFEdipine  90 mg Oral Daily    pantoprazole  40 mg Oral Before breakfast    polyethylene glycol  17 g Oral Daily    QUEtiapine  50 mg Oral BID    tamsulosin  0.4 mg Oral Daily       CONTINUOUS INFUSIONS:    PRN MEDS:clonazePAM, dextrose 10%, dextrose 10%, glucagon (human recombinant), glucose, glucose, insulin aspart U-100, ipratropium, naloxone, sodium chloride 0.9%    LABS AND DIAGNOSTICS     CBC LAST 3 DAYS  Recent Labs   Lab 01/16/23  1015 01/16/23  1807   WBC 8.03 7.16   RBC 4.31* 4.08*   HGB 10.4* 9.7*   HCT 34.0* 31.9*   MCV 79* 78*   MCH 24.1* 23.8*   MCHC 30.6* 30.4*   RDW 16.1* 16.2*    127*   MPV 10.9 10.4   GRAN 74.1*  6.0 76.9*  5.5   LYMPH 14.3*  1.2 11.6*  0.8*   MONO 9.7  0.8 9.8  0.7   BASO 0.03 0.02   NRBC 0 0         COAGULATION LAST 3 DAYS  No results for input(s): LABPT, INR, APTT in the last 168 hours.    CHEMISTRY LAST 3  DAYS  Recent Labs   Lab 01/16/23  1047 01/16/23  1250 01/16/23  1542 01/16/23  1832 01/17/23  0504 01/18/23  0421 01/19/23  0449   NA  --    < >  --    < > 136  136 137 134*   K  --    < >  --    < > 4.1  4.1 4.6 4.4   CL  --    < >  --    < > 104  103 103 102   CO2  --    < >  --    < > 22*  23 26 26   ANIONGAP  --    < >  --    < > 10  10 8 6*   BUN  --    < >  --    < > 68*  67* 71* 68*   CREATININE  --    < >  --    < > 3.2*  3.2* 3.0* 2.9*   GLU  --    < >  --    < > 97  95 131* 122*   CALCIUM  --    < >  --    < > 8.4*  8.4* 8.6* 8.3*   PH 7.323*  --   --   --   --   --   --    MG  --   --  2.6  --   --  2.5  --    ALBUMIN  --    < >  --   --  2.9* 2.8* 2.8*   PROT  --   --   --   --  6.0 6.1 6.1   ALKPHOS  --   --   --   --  53* 61 68   ALT  --   --   --   --  16 19 23   AST  --   --   --   --  15 18 27   BILITOT  --   --   --   --  1.1* 0.9 0.8    < > = values in this interval not displayed.         CARDIAC PROFILE LAST 3 DAYS  Recent Labs   Lab 01/16/23  1015 01/16/23  1250 01/16/23  1542 01/17/23  0504 01/18/23  1842   BNP 1,225*  --  1,118*  --   --    CPK  --   --  43  --   --    TROPONINIHS 73.7*   < > 143.1* 206.1*  206.1* 82.1*    < > = values in this interval not displayed.         ENDOCRINE LAST 3 DAYS  No results for input(s): TSH, PROCAL in the last 168 hours.    LAST ARTERIAL BLOOD GAS  ABG  Recent Labs   Lab 01/16/23  1047   PH 7.323*   PO2 60*   PCO2 38.9   HCO3 20.2*   BE -6         LAST 7 DAYS MICROBIOLOGY   Microbiology Results (last 7 days)       ** No results found for the last 168 hours. **            MOST RECENT IMAGING  Echo  · The left ventricle is normal in size with moderate concentric   hypertrophy and normal systolic function.  · The estimated ejection fraction is 60%.  · Indeterminate left ventricular diastolic function.  · Normal right ventricular size with normal right ventricular systolic   function.  · Mild left atrial enlargement.  · There is severe aortic valve  stenosis.  · Aortic valve area is 0.98 cm2; peak velocity is 4.2 m/s; mean gradient   is 45 mmHg.  · Mild tricuspid regurgitation.  · Normal central venous pressure (3 mmHg).  · The estimated PA systolic pressure is 16 mmHg.  · Mildly elevated mean gradient across mitral valve with normal mitral   valve area by pressure half time.     US Retroperitoneal Complete  Renal ultrasound    HISTORY: Abnormal renal function.    The right kidney measures 10.6 cm in sagittal dimension. The left kidney measures 9.7 cm. Left upper pole renal cortical cysts measure up to 3.2 cm. There are no solid masses or hydronephrosis is observed.    A trace amount of perinephric fluid is seen about the right kidney. The midline retroperitoneum is predominantly obscured. Incidental observation is made of a small right-sided pleural effusion.    The urinary bladder is distended but otherwise demonstrates no abnormality.    IMPRESSION:    Left renal cysts.    Trace amount of right-sided perinephric free fluid    Distention of the urinary bladder.    Obscuration of the midline retroperitoneum.    Electronically signed by:  Chuy Patton MD  1/16/2023 2:05 PM CST Workstation: [id number]  X-Ray Chest AP Portable  HISTORY: Chest pain,  dyspnea and hypoxia.    FINDINGS: Portable chest radiograph at 1110 hours compared to prior exams including 09/14/2022 shows the cardiomediastinal silhouette and pulmonary vasculature are within normal limits. There are aortic vascular calcifications.    The lungs are normally expanded, with right lung base airspace opacities medially, nonspecific. There is no lobar consolidation, large pleural effusion, evidence of pulmonary edema, or pneumothorax. No acute fractures or destructive osseous lesions.    IMPRESSION: Right lung base airspace opacities, potentially atelectasis, or pneumonia in the appropriate clinical setting.    Electronically signed by:  Damon Chaves MD  1/16/2023 12:03 PM CST Workstation:  [id number]      ECHOCARDIOGRAM RESULTS (last 5)  Results for orders placed during the hospital encounter of 01/16/23    Echo    Interpretation Summary  · The left ventricle is normal in size with moderate concentric hypertrophy and normal systolic function.  · The estimated ejection fraction is 60%.  · Indeterminate left ventricular diastolic function.  · Normal right ventricular size with normal right ventricular systolic function.  · Mild left atrial enlargement.  · There is severe aortic valve stenosis.  · Aortic valve area is 0.98 cm2; peak velocity is 4.2 m/s; mean gradient is 45 mmHg.  · Mild tricuspid regurgitation.  · Normal central venous pressure (3 mmHg).  · The estimated PA systolic pressure is 16 mmHg.  · Mildly elevated mean gradient across mitral valve with normal mitral valve area by pressure half time.      Results for orders placed during the hospital encounter of 07/25/22    2D echo with color flow doppler    Narrative  Transthoracic Echocardiogram Report    Patient Name  : [place] [address]    Phone: [phone]    Interpreting Physician: CAMILA BALBUENA MD  Demographics:  Name:  DON REYNOLDS   YOB: 1960  Age/Sex:  62, Male   Height: 5 ft 9 in  Weight:   198 pounds    BSA: 2.11 cc/m?  BMI:      29.2   Date of Study: 07/26/2022  Medical Record No:   [id number]   Location: [facility]  Referring Physician:   CAMILA BALBUENA   Technologist: LAURIE Mesa  Study:   Trans Thoracic Echocardiogram  Study Quality:   Average  Procedure  Type: Adult TTE (Date Study Ordered : 07/26/2022)  Components: 2D,Color Flow Doppler,Doppler,M-mode,TDI(Tissue Doppler  Imaging)  Referral diagnosis  Cardiomyopathy, ischemic  Hemodynamics  Heart rate: 54 beats/min  Blood pressure:  140/65 mmHg  ECG:    Final Impressions  1. The study quality is average.  2. Global left ventricular systolic function is normal. The left  ventricular ejection  fraction is 50-55%.  3. Severe aortic valve stenosis is present. Aortic valve area  continuity equation is 0.9 cm?.  4. Moderate calcification of the aortic valve is noted.  Moderate  mitral annular calcification is noted.  5. The left atrium is mildly enlarged. Left atrial diameter is 4.6  cms.  6. Moderate (2+) aortic regurgitation. Mild (1+) mitral regurgitation.  Trace tricuspid regurgitation.  7. The pulmonary artery appears normal.    Intra-modality comparison (Echocardiogram)  This echocardiogram when compared with the latest echocardiogram-TTE  ([facility]) dated 4/5/2022 shows:  1. Ejection fraction has increased from 30% to 50%.  2. Left atrium size changed from severely increased to mildly  increased and left ventricle size changed from mildly increased to  normal is noted in the current study.  3. Mitral valve area by pressure halftime essentially remained  unchanged (2.3 cm? previous study, 2 cm? current study).    Findings    Left Atrium  The left atrium is mildly enlarged. Left atrial diameter is 4.6 cms.    Right Atrium  The right atrium is normal.    Left Ventricle  The left ventricle is normal in size. Left ventricular diastolic  dimension is 5.1 cms. Left ventricular systolic dimension is 3.6 cms.  Left ventricular diastolic septal thickness is 1.5 cms. Left  ventricular diastolic postero basal free wall thickness is 1.5 cms.  Global left ventricular systolic function is borderline normal. The  left ventricular fractional shortening is 29.5%. The left ventricular  ejection fraction is 50%. Left ventricular outflow tract diameter is 2  cms. Left ventricular outflow tract VTI is 24.8 cms. Left ventricular  outflow tract mean velocity is 0.7 m/s. Left ventricular mean gradient  is 2 mmHg.    Right Ventricle  Right ventricular systolic function is normal. Right ventricular  diastolic dimension is 3.8 cms.    Atrial Septum  The atrial septum is normal.    Ventricular Septum  The ventricular septum is  normal.    Pulmonary Vein  The pulmonary vein appears normal.    IVC  The inferior vena cava is normal.    Pulmonic Valve  The peak velocity is 0.8 m/s. The mean velocity is 0.7 m/s. The peak  trans pulmonic gradient is 3 mmHg. The mean trans pulmonic gradient is  2 mmHg.    Tricuspid Valve  Evidence of tricuspid regurgitation is present. Trace tricuspid  regurgitation.    Mitral Valve  Moderate mitral annular calcification is noted. Evidence of mitral  stenosis is noted. The area by pressure half time is 2 cm?. The mean  trans mitral gradient is 2 mmHg. Mitral regurgitation is noted. Mild  (1+) mitral regurgitation. The pressure half time is 108 ms. The  deceleration time is 367 ms. The E velocity is 1 m/s. The A velocity  is 1 m/s.    Aortic Valve  Noted evidence of aortic stenosis. Severe aortic valve stenosis is  present. Aortic valve area continuity equation is 0.9 cm?. Noted  evidence of aortic regurgitation. Moderate (2+) aortic regurgitation.  The trans-aortic peak velocity is 3.1 m/s. The trans-aortic peak  gradient is 40 mmHg. The trans-aortic mean velocity is 2.2 m/s. The  trans-aortic mean gradient is 23 mmHg. The regurgitation pressure half  time is 550 ms. Aortic valve VTI measures 82.5 cms. Moderate  calcification of the aortic valve is noted.  LVOT Diameter is 2 cms.    Aorta  Aortic root diameter is 3.1 cms.    Pericardium  The pericardium is normal in appearance.    Measurements  Left Atrium  Diameter   4.6cm(s)  Volume   50.7ml  Volume Index   24ml/m?    Left Ventricle  End Diastolic Dimension   5.1cm(s)  End Systolic Dimension   3.6cm(s)  Posterior Basal Free Wall Thickness   1.5cm(s)  End Diastolic Septal Thickness   1.5cm(s)  Ejection Fraction   50%  Fractional Shortening   29.5    Right Ventricle  Diastolic Diameter   3.8cm(s)    Pulmonic Valve  Peak Pulmonic Velocity   0.8m/s  Mean Pulmonic Velocity   0.7m/s  Peak Trans Pulmonic Gradient   3mmHg  Mean Trans Pulmonic Gradient   2mmHg    Mitral  Valve  Pressure Half Time   108ms  Deceleration Time   367ms  A Velocity   1m/s  E Velocity   1m/s  Mean Trans Mitral Gradient   2mmHg  Area By Pressure Half Time   2cm?    Aortic Valve  Trans Aortic Peak Velocity   3.1m/s  Trans Aortic Mean Velocity   2.2m/s  Trans Aortic Peak Gradient   40mmHg  Trans Aortic Mean Gradient   23mmHg  AV Area Indexed To BSA   0.4cm?/m?  Area By Continuity Equation   0.9cm?  Regurgitation Pressure Half Time   550ms  Aortic Valve VTI   82.5cm(s)  LVOT Diameter   2cm(s)        Electronically Authenticated by  CAMILA BALBUENA MD  07/26/2022 , 11:29      Results for orders placed during the hospital encounter of 04/04/22    2D echo with color flow doppler    Narrative  Transthoracic Echocardiogram Report    Patient Name  : [place] [address]    Phone: [phone]    Interpreting Physician: ARGENIS SPRINGER MD  Demographics:  Name:  DON REYNOLDS   YOB: 1960  Age/Sex:  61, Male   Height: 5 ft 11 in  Weight:   210 pounds    BSA: 2.21 cc/m?  BMI:      29.3   Date of Study: 04/05/2022  Medical Record No:   [id number]   Location: 1606  Referring Physician:   JOE FRANCO   Technologist: Tracey SULLIVAN  Study:   Trans Thoracic Echocardiogram  Study Quality:   Good  Procedure  Type: Adult TTE (Date Study Ordered : 04/05/2022)  Components: 2D,Color Flow Doppler,Doppler,M-mode,TDI(Tissue Doppler  Imaging)  Referral diagnosis  CHF  Hemodynamics  Heart rate: 0 beats/min  Blood pressure:  ECG: Normal Sinus Rhythm    Final Impressions  1. The study quality is good.  2. The left ventricle is mildly enlarged. Global left ventricular  systolic function is severely decreased. The left ventricular ejection  fraction is 30%. Left ventricular diastolic function is abnormal  (stage I impaired relaxation).  3. The left atrium is severely enlarged.  4. The aortic valve is tricuspid. Noted evidence of aortic stenosis.  Moderate  aortic valve stenosis is present. Aortic valve area  continuity equation is 1.47 cm?. Noted evidence of aortic  regurgitation. Moderate (2+) aortic regurgitation. The trans-aortic  mean gradient is 27 mmHg.  5. The right ventricle is normal in size. Right ventricular systolic  function is normal.    Intra-modality comparison (Echocardiogram)  This echocardiogram when compared with the latest echocardiogram-TTE  [place] 7/9/2021 shows:  1. Ejection fraction has increased from 20% to 30%.  2. Left ventricle size changed from normal to mildly increased is  noted in the current study.  3. Mitral valve area by pressure halftime has decreased from 3.7 cm?  to 2.3 cm?.    Findings    Left Atrium  The left atrium is severely enlarged.    Right Atrium  The right atrium is normal.    Left Ventricle  The left ventricle is mildly enlarged. Left ventricular diastolic  dimension is 6.1 cms. Left ventricular systolic dimension is 5.3 cms.  Left ventricular diastolic septal thickness is 1 cm. Left ventricular  diastolic postero basal free wall thickness is 1 cm. Global left  ventricular systolic function is severely decreased. The left  ventricular fractional shortening is 13.4%. The left ventricular  ejection fraction is 30%. Left ventricular diastolic function is  abnormal (stage I impaired relaxation). Left ventricular outflow tract  diameter is 2.1 cms. Left ventricular outflow tract VTI is 27.4 cms.  Left ventricular outflow tract mean velocity is 1 m/s. Left  ventricular mean gradient is 5 mmHg.    Right Ventricle  The right ventricle is normal in size. Right ventricular systolic  function is normal. Right ventricular diastolic dimension is 2.4 cms.    Atrial Septum  The atrial septum is normal.    Ventricular Septum  The ventricular septum is normal.    Pulmonary Vein  The pulmonary vein appears normal.    IVC  The inferior vena cava is normal.    Pulmonic Valve  The mean velocity is 0.8 m/s. The mean trans pulmonic  gradient is 3  mmHg.    Tricuspid Valve  Evidence of tricuspid regurgitation is present. Trace tricuspid  regurgitation.    Mitral Valve  Mitral regurgitation is noted. Mild (1+) mitral regurgitation. The  pressure half time is 95 ms. The deceleration time is 323 ms. The E  velocity is 1.1 m/s. The A velocity is 1.4 m/s. Mild calcification of  the mitral subvalvular apparatus is noted.    Aortic Valve  The aortic valve is tricuspid. Noted evidence of aortic stenosis.  Moderate aortic valve stenosis is present. Aortic valve area  continuity equation is 1.47 cm?. Noted evidence of aortic  regurgitation. Moderate (2+) aortic regurgitation. The trans-aortic  mean velocity is 2.5 m/s. The trans-aortic mean gradient is 27 mmHg.  The regurgitation pressure half time is 331 ms. Aortic valve VTI  measures 57.5 cms. Moderate calcification of the aortic valve is  noted.  LVOT Diameter is 2.1 cms.    Aorta  Aortic root diameter is 3.4 cms.    Pericardium  The pericardium is normal in appearance.    Measurements  Left Atrium  Volume   88ml  Volume Index   39.8ml/m?    Left Ventricle  End Diastolic Dimension   6.1cm(s)  End Systolic Dimension   5.3cm(s)  Posterior Basal Free Wall Thickness   1cm(s)  End Diastolic Septal Thickness   1cm(s)  Ejection Fraction   30%  Fractional Shortening   13.4    Right Ventricle  Diastolic Diameter   2.4cm(s)    Pulmonic Valve  Mean Pulmonic Velocity   0.8m/s  Mean Trans Pulmonic Gradient   3mmHg    Mitral Valve  Pressure Half Time   95ms  Deceleration Time   323ms  A Velocity   1.4m/s  E Velocity   1.1m/s  Area By Pressure Half Time   2.3cm?    Aortic Valve  Trans Aortic Mean Velocity   2.5m/s  Trans Aortic Mean Gradient   27mmHg  AV Area Indexed To BSA   0.7cm?/m?  Area By Continuity Equation   1.47cm?  Regurgitation Pressure Half Time   331ms  Aortic Valve VTI   57.5cm(s)  LVOT Diameter   2.1cm(s)        Electronically Authenticated by  ARGENIS SPRINGER MD  04/05/2022 , 21:09      Results for  orders placed during the hospital encounter of 07/08/21    2D echo with color flow doppler    Narrative  Transthoracic Echocardiogram Report    Patient Name  : DON REYNOLDS  [facility]  [address] [place] [address]    Phone: [phone]    Interpreting Physician: CAMILA BALBUENA MD  Demographics:  Name:  DON REYNOLDS   YOB: 1960  Age/Sex:  61, Male   Height: 5 ft 8 in  Weight:   287 pounds    BSA: 2.56 cc/m?  BMI:      43.6   Date of Study: 07/09/2021  Medical Record No:   [id number]   Location: [id number]  Referring Physician:   ELODIA FISCHER   Technologist: Edna Ortiz RDCS,RVT  Study:   Trans Thoracic Echocardiogram  Study Quality:   Average  Procedure  Type: Adult TTE (Date Study Ordered : 07/09/2021)  Components: 2D,Color Flow Doppler,Doppler,M-mode,TDI(Tissue Doppler  Imaging)  Referral diagnosis  CHF  Hemodynamics  Heart rate: 123 beats/min  Blood pressure:  118/66 mmHg  ECG:    Final Impressions  1. The study quality is average.  2. Global left ventricular systolic function is severely decreased.  The left ventricular ejection fraction is 20%. Optison was attempted  but IV was not accessible. Can repeat with optison if needed once new  IV is in place.  3. Left ventricular diastolic function is iabnormal. Stage III  restrictive pattern  4. The left atrium is severely enlarged. Left atrial diameter is 5.5  cms.  5. Right ventricular systolic function is severely decreased. Right  ventricular diastolic dimension is 4.2 cms. Right ventricular systolic  pressure is 58 mmHg. TAPSE measures 1.2cm.  6. Evidence of mitral stenosis is noted. The area by pressure half  time is 3.7 cm?. The mean trans mitral gradient is 5 mmHg.  7. Moderate to severe aortic valve stenosis is present. Aortic valve  area continuity equation is 1.2 cm?. The trans-aortic peak velocity is  3.6 m/s. The trans-aortic peak gradient is 52 mmHg. The trans-aortic  mean velocity is 2.1 m/s. The trans-aortic  mean gradient is 22 mmHg.  Aortic valve VTI measures 61.4 cms. Severe calcification of the aortic  valve is noted.  LVOT Diameter is 2.1 cms.  8. Mild (1+) aortic regurgitation. Mild (1+) mitral regurgitation.  Mild (1+) tricuspid regurgitation.  9. The pulmonary artery systolic pressure is 58 mmHg. Evidence of  pulmonary hypertension is noted.  10. A small pericardial effusion is noted. It is an anterior and  posterior pericardial effusion. The pericardial effusion thickness is  1.8 cms.    Findings    Left Atrium  The left atrium is severely enlarged. Left atrial diameter is 5.5 cms.    Right Atrium  The right atrium is normal in size. Right atrial diameter is 4.4 cms.    Left Ventricle  The left ventricle is normal in size. Left ventricular diastolic  dimension is 5.1 cms. Left ventricular systolic dimension is 4.1 cms.  Left ventricular diastolic septal thickness is 1.4 cms. Left  ventricular diastolic postero basal free wall thickness is 1.7 cms.  Global left ventricular systolic function is severely decreased. The  left ventricular ejection fraction is 20%. Left ventricular diastolic  function is indeterminate. Left ventricular outflow tract VTI is 23.9  cms. Left ventricular outflow tract mean velocity is 0.7 m/s. Left  ventricular mean gradient is 3 mmHg.    Right Ventricle  Right ventricular systolic function is severely decreased. Right  ventricular diastolic dimension is 4.2 cms. Right ventricular systolic  pressure is 58 mmHg. TAPSE measures 1.2cm.    Atrial Septum  The atrial septum is normal.    Ventricular Septum  The ventricular septum is normal.    Pulmonary Artery  The pulmonary artery systolic pressure is 58 mmHg. Evidence of  pulmonary hypertension is noted.    Pulmonary Vein  The pulmonary vein appears normal.    IVC  The inferior vena cava is normal.    Pulmonic Valve  Good excursion of the pulmonic valve cusps is noted. The peak velocity  is 1 m/s. The mean velocity is 0.7 m/s. The peak trans  pulmonic  gradient is 4 mmHg. The mean trans pulmonic gradient is 2 mmHg.    Tricuspid Valve  Evidence of tricuspid regurgitation is present. Mild (1+) tricuspid  regurgitation. The peak tricuspid regurgitant velocity is 3.5 m/s. The  peak trans tricuspid gradient is 50 mmHg.    Mitral Valve  Mobility of the anterior mitral leaflet is mildly decreased. Mobility  of the posterior mitral leaflet is mildly decreased. Mild  calcification of the mitral valve is noted. Evidence of mitral  stenosis is noted. The area by pressure half time is 3.7 cm?. The mean  trans mitral gradient is 5 mmHg. Mitral regurgitation is noted. Mild  (1+) mitral regurgitation. The pressure half time is 59 ms. The  deceleration time is 203 ms. The E velocity is 1.7 m/s. The A velocity  is 0.6 m/s.    Aortic Valve  The aortic valve is tricuspid. Noted evidence of aortic stenosis.  Moderate to severe aortic valve stenosis is present. Aortic valve area  continuity equation is 1.2 cm?. Noted evidence of aortic  regurgitation. Mild (1+) aortic regurgitation. The trans-aortic peak  velocity is 3.6 m/s. The trans-aortic peak gradient is 52 mmHg. The  trans-aortic mean velocity is 2.1 m/s. The trans-aortic mean gradient  is 22 mmHg. Aortic valve VTI measures 61.4 cms. Severe calcification  of the aortic valve is noted.  LVOT Diameter is 2.1 cms.    Aorta  Aortic root diameter is 2.7 cms. The aortic arch is normal.    Pericardium  A small pericardial effusion is noted. It is an anterior and posterior  pericardial effusion. The pericardial effusion thickness is 1.8 cms.    Measurements  Left Atrium  Diameter   5.5cm(s)  Volume   83.9ml  Volume Index   32.8ml/m?    Right Atrium  Diameter   4.4cm(s)    Left Ventricle  End Diastolic Dimension   5.1cm(s)  End Systolic Dimension   4.1cm(s)  Posterior Basal Free Wall Thickness   1.7cm(s)  End Diastolic Septal Thickness   1.4cm(s)  Ejection Fraction   20%    Right Ventricle  Diastolic Diameter    4.2cm(s)    Pulmonic Valve  Peak Pulmonic Velocity   1m/s  Mean Pulmonic Velocity   0.7m/s  Peak Trans Pulmonic Gradient   4mmHg  Mean Trans Pulmonic Gradient   2mmHg    Tricuspid Valve  Peak Tricuspid Regurgitant Velocity   3.5m/s  Peak Tricuspid Regurgitant Gradient   50mmHg  Pulmonary Artery Systolic Pressure  58mmHg    Mitral Valve  Pressure Half Time   59ms  Deceleration Time   203ms  A Velocity   0.6m/s  E Velocity   1.7m/s  Mean Trans Mitral Gradient   5mmHg  Area By Pressure Half Time   3.7cm?    Aortic Valve  Trans Aortic Peak Velocity   3.6m/s  Trans Aortic Mean Velocity   2.1m/s  Trans Aortic Peak Gradient   52mmHg  Trans Aortic Mean Gradient   22mmHg  AV Area Indexed To BSA   0.5cm?/m?  Area By Continuity Equation   1.2cm?  Aortic Valve VTI   61.4cm(s)  LVOT Diameter   2.1cm(s)    Pericardium  Pericardial Effusion Length   1.8cm(s)        Electronically Authenticated by  CAMILA BALBUENA MD  07/09/2021 , 10:05      Results for orders placed during the hospital encounter of 07/03/19    ECHO REPORT      CURRENT/PREVIOUS VISIT EKG  Results for orders placed or performed during the hospital encounter of 01/16/23   EKG 12-lead    Collection Time: 01/16/23  3:50 PM    Narrative    Test Reason : R77.8,    Vent. Rate : 074 BPM     Atrial Rate : 074 BPM     P-R Int : 154 ms          QRS Dur : 112 ms      QT Int : 396 ms       P-R-T Axes : 056 -10 108 degrees     QTc Int : 439 ms    Sinus rhythm with Premature atrial complexes  Septal infarct (cited on or before 16-JAN-2023)  T wave abnormality, consider lateral ischemia  Abnormal ECG  When compared with ECG of 16-JAN-2023 10:25,  Premature atrial complexes are now Present  Serial changes of Septal infarct Present    Referred By: [email or web address]   SELF           Confirmed By:            ASSESSMENT/PLAN:     Active Hospital Problems    Diagnosis    *Acute on chronic combined systolic and diastolic CHF, NYHA class 1    Hyponatremia    Acute respiratory failure with hypoxia     Type 2 diabetes mellitus with neurologic complication, with long-term current use of insulin    Steroid-induced hyperglycemia    MDD (major depressive disorder), recurrent episode, moderate    Microcytic anemia    Vascular dementia with delirium    Methamphetamine / cocaine dependence    Hypothyroid    CAD (coronary artery disease)    Hyperkalemia    Stage 3 severe COPD by GOLD classification    Hypertension    AYDEE on CPAP       ASSESSMENT & PLAN:     Acute on chronic combined systolic and diastolic heart failure  Acute respiratory failure with hypoxia  Severe aortic stenosis  BREA on CKD  COPD  Illicit drug use : positive for amphetamines      RECOMMENDATIONS:    Remains stable. continue furosemide 20 mg po daily PRN for outpatient diuresis.   Troponin downtrended last night. No chest pain reported.   Will see PRN.         Kimber Parrish NP  [facility]  Department of Cardiology  Date of Service: 01/19/2023  4:37 PM \    I have personally interviewed and examined the patient, I have reviewed the Nurse Practitioner's history and physical, assessment, and plan. I have personally evaluated the patient at bedside and agree with the findings and made appropriate changes as necessary in recommendations.      Corwin Fisher MD  Department of Cardiology  [facility]  01/19/2023 4:44 PM

## 2023-01-20 LAB
ALBUMIN SERPL BCP-MCNC: 2.8 G/DL (ref 3.5–5.2)
ALP SERPL-CCNC: 61 U/L (ref 55–135)
ALT SERPL W/O P-5'-P-CCNC: 32 U/L (ref 10–44)
ANION GAP SERPL CALC-SCNC: 5 MMOL/L (ref 8–16)
AST SERPL-CCNC: 36 U/L (ref 10–40)
BILIRUB SERPL-MCNC: 0.5 MG/DL (ref 0.1–1)
BUN SERPL-MCNC: 55 MG/DL (ref 8–23)
CALCIUM SERPL-MCNC: 8.6 MG/DL (ref 8.7–10.5)
CHLORIDE SERPL-SCNC: 104 MMOL/L (ref 95–110)
CO2 SERPL-SCNC: 26 MMOL/L (ref 23–29)
CREAT SERPL-MCNC: 2.4 MG/DL (ref 0.5–1.4)
EST. GFR  (NO RACE VARIABLE): 29.8 ML/MIN/1.73 M^2
GLUCOSE SERPL-MCNC: 114 MG/DL (ref 70–110)
POTASSIUM SERPL-SCNC: 4.4 MMOL/L (ref 3.5–5.1)
PROT SERPL-MCNC: 6.2 G/DL (ref 6–8.4)
SODIUM SERPL-SCNC: 135 MMOL/L (ref 136–145)

## 2023-01-20 PROCEDURE — 25000003 PHARM REV CODE 250: Performed by: STUDENT IN AN ORGANIZED HEALTH CARE EDUCATION/TRAINING PROGRAM

## 2023-01-20 PROCEDURE — 99900031 HC PATIENT EDUCATION (STAT)

## 2023-01-20 PROCEDURE — 25000003 PHARM REV CODE 250: Performed by: NURSE PRACTITIONER

## 2023-01-20 PROCEDURE — 25000003 PHARM REV CODE 250: Performed by: INTERNAL MEDICINE

## 2023-01-20 PROCEDURE — 12000002 HC ACUTE/MED SURGE SEMI-PRIVATE ROOM

## 2023-01-20 PROCEDURE — 36415 COLL VENOUS BLD VENIPUNCTURE: CPT | Performed by: NURSE PRACTITIONER

## 2023-01-20 PROCEDURE — 97116 GAIT TRAINING THERAPY: CPT

## 2023-01-20 PROCEDURE — 21000000 HC CCU ICU ROOM CHARGE

## 2023-01-20 PROCEDURE — 80053 COMPREHEN METABOLIC PANEL: CPT | Performed by: NURSE PRACTITIONER

## 2023-01-20 PROCEDURE — 25000003 PHARM REV CODE 250

## 2023-01-20 PROCEDURE — 99900035 HC TECH TIME PER 15 MIN (STAT)

## 2023-01-20 PROCEDURE — 94761 N-INVAS EAR/PLS OXIMETRY MLT: CPT

## 2023-01-20 PROCEDURE — 97530 THERAPEUTIC ACTIVITIES: CPT

## 2023-01-20 RX ORDER — ACETAMINOPHEN 325 MG/1
650 TABLET ORAL EVERY 6 HOURS PRN
Status: DISCONTINUED | OUTPATIENT
Start: 2023-01-20 | End: 2023-01-24 | Stop reason: HOSPADM

## 2023-01-20 RX ADMIN — QUETIAPINE 50 MG: 25 TABLET ORAL at 08:01

## 2023-01-20 RX ADMIN — ATORVASTATIN CALCIUM 40 MG: 40 TABLET, FILM COATED ORAL at 09:01

## 2023-01-20 RX ADMIN — CLONAZEPAM 0.5 MG: 0.5 TABLET ORAL at 08:01

## 2023-01-20 RX ADMIN — NIFEDIPINE 90 MG: 30 TABLET, FILM COATED, EXTENDED RELEASE ORAL at 09:01

## 2023-01-20 RX ADMIN — LEVOTHYROXINE SODIUM 50 MCG: 0.03 TABLET ORAL at 05:01

## 2023-01-20 RX ADMIN — MUPIROCIN 1 G: 20 OINTMENT TOPICAL at 09:01

## 2023-01-20 RX ADMIN — METOPROLOL SUCCINATE 25 MG: 25 TABLET, EXTENDED RELEASE ORAL at 09:01

## 2023-01-20 RX ADMIN — ASPIRIN 81 MG CHEWABLE TABLET 81 MG: 81 TABLET CHEWABLE at 09:01

## 2023-01-20 RX ADMIN — MUPIROCIN 1 G: 20 OINTMENT TOPICAL at 08:01

## 2023-01-20 RX ADMIN — QUETIAPINE 50 MG: 25 TABLET ORAL at 09:01

## 2023-01-20 RX ADMIN — DOCUSATE SODIUM 100 MG: 100 CAPSULE, LIQUID FILLED ORAL at 09:01

## 2023-01-20 RX ADMIN — TAMSULOSIN HYDROCHLORIDE 0.4 MG: 0.4 CAPSULE ORAL at 09:01

## 2023-01-20 RX ADMIN — CHLORHEXIDINE GLUCONATE 15 ML: 1.2 RINSE ORAL at 08:01

## 2023-01-20 RX ADMIN — PANTOPRAZOLE SODIUM 40 MG: 40 TABLET, DELAYED RELEASE ORAL at 05:01

## 2023-01-20 RX ADMIN — INSULIN DETEMIR 5 UNITS: 100 INJECTION, SOLUTION SUBCUTANEOUS at 08:01

## 2023-01-20 RX ADMIN — ACETAMINOPHEN 650 MG: 325 TABLET ORAL at 09:01

## 2023-01-20 RX ADMIN — DOXAZOSIN 2 MG: 1 TABLET ORAL at 09:01

## 2023-01-20 NOTE — NURSING
Pt weaned from 3lnc to 1lnc while in bed and sitting in chair. Have yet to ambulate on room air. Still depressed, suicidal, chg bath complete. NO bm since 1/9 despite current bowel regimen. No acute changes.

## 2023-01-20 NOTE — PROGRESS NOTES
Nephrology Progress Note        Patient Name: Pam Gray  MRN: [id number]    Patient Class: IP- Inpatient   Admission Date: 1/16/2023  Length of Stay: 1 days  Date of Service: 1/20/2023    Attending Physician: Chun Oliver MD  Primary Care Provider: Jacob Zuniga MD    Reason for Consult: sob/brea/hyperkalemia/hyponatremia/acidosis/chf/anemia/thn    SUBJECTIVE:     HPI: 62M with past medical history of anxiety, arthritis, CHF, CKD stage 3, COPD, CAD, diabetes mellitus, peripheral neuropathy, CVA presented initially on 1/10 to the emergency department at [facility] in [place], [place] via ambulance with complaints of SI and depression. He was sent PECed to [facility] and now brought to [facility] with SOB. Labs show BREA, hyperkalemia. ABG with slight acidosis.    1/17  VSS, K+ at goal.  1.7L UOP recorded.  No complaints.  1/18 VSS. Appreciate cards recommendations and agree with plan.  1/19 genie, BP stable, on 3L NC, UOP 1.8L  1/20 VSS, on 1L NC, UOP 1.4L, no complaints    Outpatient meds:  No current facility-administered medications on file prior to encounter.     Current Outpatient Medications on File Prior to Encounter   Medication Sig Dispense Refill    albuterol-ipratropium (DUO-NEB) 2.5 mg-0.5 mg/3 mL nebulizer solution Take 3 mLs by nebulization every 6 (six) hours while awake. Rescue 270 mL 0    aspirin 81 MG Chew Take 1 tablet (81 mg total) by mouth once daily. 30 tablet 0    atorvastatin (LIPITOR) 40 MG tablet Take 1 tablet (40 mg total) by mouth once daily. 30 tablet 0    clonazePAM (KLONOPIN) 0.5 MG tablet Take 0.5 mg by mouth 2 (two) times daily as needed for Anxiety.      doxazosin (CARDURA) 2 MG tablet Take 1 tablet (2 mg total) by mouth once daily. 30 tablet 0    fluticasone furoate-vilanteroL (BREO) 100-25 mcg/dose diskus inhaler Inhale 1 puff into the lungs once daily. Controller 30 each 0    fluvoxaMINE (LUVOX) 25 MG tablet Take 1 tablet (25 mg total) by mouth every evening. 90  tablet 1    gabapentin (NEURONTIN) 300 MG capsule Take 1 capsule by mouth once daily.      insulin aspart U-100 (NOVOLOG) 100 unit/mL (3 mL) InPn pen Inject 1-10 Units into the skin before meals and at bedtime as needed (Hyperglycemia).  0    insulin detemir U-100 (LEVEMIR FLEXTOUCH U-100 INSULN) 100 unit/mL (3 mL) InPn pen Inject 10 Units into the skin every evening.  0    levothyroxine (SYNTHROID) 50 MCG tablet Take 1 tablet (50 mcg total) by mouth once daily.      metoprolol succinate (TOPROL-XL) 25 MG 24 hr tablet Take 1 tablet (25 mg total) by mouth once daily. 30 tablet 0    NIFEdipine (PROCARDIA-XL) 90 MG (OSM) 24 hr tablet Take 1 tablet (90 mg total) by mouth once daily. 30 tablet 0    pantoprazole (PROTONIX) 40 MG tablet Take 1 tablet (40 mg total) by mouth once daily. 30 tablet 0    QUEtiapine (SEROQUEL) 50 MG tablet Take 1 tablet (50 mg total) by mouth 2 (two) times daily. 60 tablet 11    tamsulosin (FLOMAX) 0.4 mg Cap Take 1 capsule (0.4 mg total) by mouth once daily.      tiotropium bromide (SPIRIVA RESPIMAT) 2.5 mcg/actuation inhaler Inhale 2 puffs into the lungs once daily. Controller 4 g 0    [DISCONTINUED] albuterol (PROVENTIL) 2.5 mg /3 mL (0.083 %) nebulizer solution USE 1 VIAL VIA NEBULIZER EVERY 6 HOURS AS NEEDED 7/9/21      [DISCONTINUED] amLODIPine (NORVASC) 10 MG tablet Take 1 tablet (10 mg total) by mouth once daily. 30 tablet 11    [DISCONTINUED] furosemide (LASIX) 80 MG tablet Take 1 tablet (80 mg total) by mouth 2 (two) times daily. 60 tablet 0    [DISCONTINUED] isosorbide-hydrALAZINE 20-37.5 mg (BIDIL) 20-37.5 mg Tab Take 1 tablet by mouth 2 (two) times daily. 60 tablet 0    [DISCONTINUED] metoprolol tartrate (LOPRESSOR) 50 MG tablet Take 1 tablet (50 mg total) by mouth 2 (two) times daily. 60 tablet 11    [DISCONTINUED] mirtazapine (REMERON) 30 MG tablet Take 30 mg by mouth every evening.      [DISCONTINUED] omeprazole (PRILOSEC) 40 MG capsule Take 1 capsule (40 mg total) by mouth once  daily. 30 capsule 11    [DISCONTINUED] pioglitazone (ACTOS) 30 MG tablet Take 30 mg by mouth once daily.      [DISCONTINUED] rivastigmine (EXELON) 4.6 mg/24 hour PT24 Place 1 patch onto the skin once daily. 30 patch 11    [DISCONTINUED] SITagliptin (JANUVIA) 100 MG Tab Take 100 mg by mouth once daily.      [DISCONTINUED] traZODone (DESYREL) 50 MG tablet Take 50 mg by mouth every evening.         Scheduled meds:   arformoteroL  15 mcg Nebulization BID    aspirin  81 mg Oral Daily    atorvastatin  40 mg Oral Daily    budesonide  0.5 mg Nebulization Q12H    chlorhexidine  15 mL Mouth/Throat BID    docusate sodium  100 mg Oral Daily    doxazosin  2 mg Oral Daily    fluvoxaMINE  25 mg Oral QHS    insulin detemir U-100  5 Units Subcutaneous QHS    ipratropium  0.5 mg Nebulization Q6H WAKE    levothyroxine  50 mcg Oral Before breakfast    metoprolol succinate  25 mg Oral Daily    mupirocin   Nasal BID    NIFEdipine  90 mg Oral Daily    pantoprazole  40 mg Oral Before breakfast    polyethylene glycol  17 g Oral Daily    QUEtiapine  50 mg Oral BID    tamsulosin  0.4 mg Oral Daily       Infusions:      PRN meds:      Review of Systems:  Neg    OBJECTIVE:     Vital Signs and IO:  Temp:  [97.4 °F (36.3 °C)-98.5 °F (36.9 °C)]   Pulse:  [55-80]   Resp:  [17-47]   BP: (128-164)/(57-84)   SpO2:  [87 %-99 %]   I/O last 3 completed shifts:  In: 240 [P.O.:240]  Out: 2150 [Urine:2150]  Wt Readings from Last 5 Encounters:   01/17/23 84.1 kg (185 lb 8 oz)   01/16/23 90.7 kg (200 lb)   01/10/23 89.4 kg (197 lb)   09/14/22 89.5 kg (197 lb 4.8 oz)   08/25/22 91.2 kg (201 lb)     Body mass index is 27.39 kg/m².    Physical Exam  Constitutional:       General: He is not in acute distress.     Appearance: He is well-developed. She is not diaphoretic.   HENT:      Head: Normocephalic and atraumatic.      Mouth/Throat:      Mouth: Mucous membranes are moist.   Eyes:      General: No scleral icterus.     Pupils: Pupils are equal, round, and  reactive to light.   Cardiovascular:      Rate and Rhythm: Normal rate and regular rhythm.   Pulmonary:      Effort: Pulmonary effort is normal. No respiratory distress.      Breath sounds: No stridor.   Abdominal:      General: There is no distension.      Palpations: Abdomen is soft.   Musculoskeletal:         General: No deformity. Normal range of motion.      Cervical back: Neck supple.   Skin:     General: Skin is warm and dry.      Findings: No rash present. No erythema.   Neurological:      Normal  Psychiatric:         Normal    Laboratory:  Recent Labs   Lab 01/18/23  0421 01/19/23  0449 01/20/23  0508    134* 135*   K 4.6 4.4 4.4    102 104   CO2 26 26 26   BUN 71* 68* 55*   CREATININE 3.0* 2.9* 2.4*   * 122* 114*         Recent Labs   Lab 01/16/23  1250 01/16/23  1542 01/16/23  1832 01/18/23  0421 01/19/23  0449 01/20/23  0508   CALCIUM 8.4*  --    < > 8.6* 8.3* 8.6*   ALBUMIN 3.2*  --    < > 2.8* 2.8* 2.8*   PHOS 4.6*  --   --   --   --   --    MG  --  2.6  --  2.5  --   --     < > = values in this interval not displayed.         Recent Labs   Lab 07/10/21  1030 07/11/21  0625   PTH, Intact 92.2 H 129.7 H         No results for input(s): POCTGLUCOSE in the last 168 hours.    Recent Labs   Lab 07/26/22  0631 09/14/22  1526 01/16/23  1807   Hemoglobin A1C 6.1 H 6.1 H 6.6 H         Recent Labs   Lab 01/16/23  1015 01/16/23  1807   WBC 8.03 7.16   HGB 10.4* 9.7*   HCT 34.0* 31.9*    127*   MCV 79* 78*   MCHC 30.6* 30.4*   MONO 9.7  0.8 9.8  0.7         Recent Labs   Lab 01/18/23  0421 01/19/23  0449 01/20/23  0508   BILITOT 0.9 0.8 0.5   PROT 6.1 6.1 6.2   ALBUMIN 2.8* 2.8* 2.8*   ALKPHOS 61 68 61   ALT 19 23 32   AST 18 27 36         Recent Labs   Lab 07/25/22  1726 09/14/22  1539 01/10/23  1035 01/16/23  1401   Color, UA Yellow Yellow Yellow Yellow   Appearance, UA Clear Cloudy A Clear Clear   pH, UA 6.0 5.0 6.0 6.0   Specific [place], UA 1.020 1.020 >=1.030 A 1.010   Protein, UA  3+ A 2+ A 3+ A 1+ A   Glucose, UA 1+ A Negative Negative Negative   Ketones, UA Negative Negative Negative Negative   Urobilinogen, UA Negative 1.0  --  Negative   Bilirubin (UA) Negative Negative 1+ A Negative   Occult Blood UA Trace A Negative Trace A Negative   Nitrite, UA Negative Negative Negative Negative   RBC, UA 0 4 1 1   WBC, UA 4 3 6 H 14 H   Bacteria Rare Negative None Negative   Hyaline Casts, UA 13 A 3.5 A 1 5 A         Recent Labs   Lab 04/06/22  0953 09/14/22  1550 01/16/23  1047   POC PH 7.365 7.409 7.323 L   POC PCO2 39.9 25.8 L 38.9   POC HCO3 22.3 L 18.5 20.2 L   POC PO2 60.9 L 58.8 LL 60 L   POC SATURATED O2  --  93.9 89 L   POC BE  --   --  -6   Sample  --   --  ARTERIAL         Microbiology Results (last 7 days)       ** No results found for the last 168 hours. **          Echo:  The left ventricle is normal in size with moderate concentric hypertrophy and normal systolic function.  The estimated ejection fraction is 60%.  Indeterminate left ventricular diastolic function.  Normal right ventricular size with normal right ventricular systolic function.  Mild left atrial enlargement.  There is severe aortic valve stenosis.  Aortic valve area is 0.98 cm2; peak velocity is 4.2 m/s; mean gradient is 45 mmHg.  Mild tricuspid regurgitation.  Normal central venous pressure (3 mmHg).  The estimated PA systolic pressure is 16 mmHg.  Mildly elevated mean gradient across mitral valve with normal mitral valve area by pressure half time.     ASSESSMENT/PLAN:     BREA  CKD stage 3, baseline sCr around 2  BPH  - renal function improving   - nonoliguric  - continue flomax  - no nsaids or IV contrast  - dose meds for CrCl < 30    Severe AS  Valvular CHF  - trop improved with diuresis  - can do lasix PRN    SHPT  Anemia of CKD  - stable    Thank you for allowing us to participate in the care of your patient!   We will follow the patient and provide recommendations as needed.    Patient care time was spent  personally by me on the following activities: > 35 min    Obtaining a history.  Examination of patient.  Providing medical care at the patients bedside.  Developing a treatment plan with patient or surrogate and bedside caregivers.  Ordering and reviewing laboratory studies, radiographic studies, pulse oximetry.  Ordering and performing treatments and interventions.  Evaluation of patient's response to treatment.  Discussions with consultants while on the unit and immediately available to the patient.  Re-evaluation of the patient's condition.  Documentation in the medical record.     Kenzie Tang MD      [facility]  [address] [place] [id number]    [phone] - tel  [phone] - fax    1/20/2023

## 2023-01-20 NOTE — PROGRESS NOTES
[facility] Medicine  Progress Note    Patient name: Pam Gray  MRN: [id number]  Admit Date: 1/16/2023   LOS: 1 day     SUBJECTIVE:     Principal problem: Acute on chronic combined systolic and diastolic CHF, NYHA class 1    Interval History:      1/18- more talkative today.  Sitter in room.  Plan to return to [place] pending further cardiology w/u for AS and poss isch heart disease    1/17- Patient is currently roomed in hallway, denies chest pain.  Shortness of breath has improved.  Was at [place] without oxygen, baseline req is 2LNC.  Difficult historian.  Follow up cards recs.     1/19  patient seen examined today.  Denies any shortness of breath, chest pain, dizziness, lightheadedness.  He says he requires oxygen at home but was not taking it at behavioral center.     1/20:  No overnight events reported.  Patient does the requirement for home oxygen.    Scheduled Meds:   arformoteroL  15 mcg Nebulization BID    aspirin  81 mg Oral Daily    atorvastatin  40 mg Oral Daily    budesonide  0.5 mg Nebulization Q12H    chlorhexidine  15 mL Mouth/Throat BID    docusate sodium  100 mg Oral Daily    doxazosin  2 mg Oral Daily    fluvoxaMINE  25 mg Oral QHS    insulin detemir U-100  5 Units Subcutaneous QHS    ipratropium  0.5 mg Nebulization Q6H WAKE    levothyroxine  50 mcg Oral Before breakfast    metoprolol succinate  25 mg Oral Daily    mupirocin   Nasal BID    NIFEdipine  90 mg Oral Daily    pantoprazole  40 mg Oral Before breakfast    polyethylene glycol  17 g Oral Daily    QUEtiapine  50 mg Oral BID    tamsulosin  0.4 mg Oral Daily     Continuous Infusions:  PRN Meds:acetaminophen, clonazePAM, dextrose 10%, dextrose 10%, glucagon (human recombinant), glucose, glucose, insulin aspart U-100, ipratropium, naloxone, sodium chloride 0.9%    Review of patient's allergies indicates:   Allergen Reactions    Onion Other (See Comments)     THROAT SWELLS    Pork/porcine containing products       Sikh PREFERENCE    Shellfish containing products      Sikh PREFERENCE      Shrimp      Sikh PREFERENCE         Review of Systems: As per interval history    OBJECTIVE:     Vital Signs (Most Recent)  Temp: 98.5 °F (36.9 °C) (01/19/23 1507)  Pulse: 60 (01/20/23 0759)  Resp: 18 (01/20/23 0759)  BP: 136/64 (01/20/23 0759)  SpO2: (!) 92 % (01/20/23 0759)    Vital Signs Range (Last 24H):  Temp:  [98.5 °F (36.9 °C)]   Pulse:  [55-80]   Resp:  [17-47]   BP: (128-164)/(57-84)   SpO2:  [87 %-99 %]     I & O (Last 24H):  Intake/Output Summary (Last 24 hours) at 1/20/2023 1402  Last data filed at 1/20/2023 1256  Gross per 24 hour   Intake 120 ml   Output 1775 ml   Net -1655 ml       Physical Exam:  General: Patient resting eyes closed, arouses easily  Eyes: No conjunctival injection. No scleral icterus.  ENT: Hearing grossly intact. No discharge from ears. No nasal discharge.   CVS: RRR. trace LE edema BL  Lungs:  No tachypnea or accessory muscle use.  Clear to auscultation bilaterally  Abdomen:  Soft, nontender and nondistended.  No organomegaly  Neuro: Alert. Speech is slow and slurred. Moves all extremities. Follows commands.       Laboratory:  I have reviewed all pertinent lab results within the past 24 hours.    Diagnostic Results:  Labs: Reviewed  ECG: Reviewed  X-Ray: Reviewed  Echo: Reviewed    ASSESSMENT/PLAN:     Shortness of breath  COPD  HFpEF  Severe aortic stenosis  Restart home controllers for copd- pt refusing  Duonebs prn  Continue IV lasix 40 mg daily  Echo w/EF 60%, suspect some degree of diastolic dysfx as well as severe AS  Cardiology consulted  Considering possible SAVR vs TAVR, but would be fairly high risk   Patient appears to be at his baseline clinical status.  He is supposed to be on oxygen chronically which he was not on at home    BREA/CKD  Hyperkalemia   - resolved    Dispo   Medically cleared however looks like behavioral will  not take him with oxygen.    Active Hospital Problems     Diagnosis  POA    *Acute on chronic combined systolic and diastolic CHF, NYHA class 1 [I50.43]  Yes    Hyponatremia [E87.1]  Yes    Acute respiratory failure with hypoxia [J96.01]  Yes    Type 2 diabetes mellitus with neurologic complication, with long-term current use of insulin [E11.49, Z79.4]  Not Applicable    Steroid-induced hyperglycemia [R73.9, T38.0X5A]  Yes    MDD (major depressive disorder), recurrent episode, moderate [F33.1]  Yes    Microcytic anemia [D50.9]  Yes    Vascular dementia with delirium [F01.50, F05]  Yes    Methamphetamine / cocaine dependence [F15.20]  Yes    Hypothyroid [E03.9]  Yes    CAD (coronary artery disease) [I25.10]  Yes    Hyperkalemia [E87.5]  Yes    Stage 3 severe COPD by GOLD classification [J44.9]  Yes    Hypertension [I10]  Yes    AYDEE on CPAP [G47.33, Z99.89]  Not Applicable      Resolved Hospital Problems   No resolved problems to display.               VTE Risk Mitigation (From admission, onward)           Ordered     Reason for No Pharmacological VTE Prophylaxis  Once        Question:  Reasons:  Answer:  Physician Provided (leave comment)    01/16/23 1353     Place RADHA hose  Until discontinued         01/16/23 1353     IP VTE HIGH RISK PATIENT  Once         01/16/23 1353     Place sequential compression device  Until discontinued         01/16/23 1353                        Department Hospital Medicine  [facility]  Chun Booker MD

## 2023-01-20 NOTE — PT/OT/SLP PROGRESS
Occupational Therapy   Treatment    Name: Pam Gray  MRN: [id number]  Admitting Diagnosis:  Acute on chronic combined systolic and diastolic CHF, NYHA class 1       Recommendations:     Discharge Recommendations: home health OT  Discharge Equipment Recommendations:  none  Barriers to discharge:       Assessment:     Pam Gray is a 62 y.o. male with a medical diagnosis of Acute on chronic combined systolic and diastolic CHF, NYHA class 1.  Pt agreeable to OT therapy session this AM. Performance deficits affecting function are weakness, impaired endurance, impaired self care skills, impaired functional mobility, gait instability, impaired balance, decreased coordination, decreased safety awareness, impaired cardiopulmonary response to activity. Pt declined ADLs or UE exercises this date.    Rehab Prognosis:  Fair; patient would benefit from acute skilled OT services to address these deficits and reach maximum level of function.       Plan:     Patient to be seen 5 x/week to address the above listed problems via self-care/home management, therapeutic activities, therapeutic exercises  Plan of Care Expires: 02/09/23  Plan of Care Reviewed with: patient    Subjective     Pain/Comfort:  Pain Rating 1: 0/10    Objective:     Communicated with: nursing prior to session.  Patient found up in chair with telemetry, pulse ox (continuous), blood pressure cuff (sitter) upon OT entry to room.    General Precautions: Standard, fall    Orthopedic Precautions:N/A  Braces: N/A  Respiratory Status: Room air     Occupational Performance:     Functional Mobility/Transfers:  Patient completed Sit <> Stand Transfer with contact guard assistance  with  rolling walker   Functional Mobility: pt amb in room ~ 25 ft with RW with Minimum assistance and with cues needed for safe RW management    Activities of Daily Living:  refused    Treatment & Education:  Pt educated on role of OT/POC, importance of OOB/EOB activity, use  of call bell, and safety during ADLs, transfers, and functional mobility.    Patient left up in chair with all lines intact, call button in reach, and sitter present    GOALS:   Multidisciplinary Problems       Occupational Therapy Goals          Problem: Occupational Therapy    Goal Priority Disciplines Outcome Interventions   Occupational Therapy Goal     OT, PT/OT Ongoing, Progressing    Description: Goals to be met by: 2/9/2023     Patient will increase functional independence with ADLs by performing:    LE Dressing with Modified Kay.  Grooming while standing at sink with Modified Kay.  Toileting from toilet with Modified Kay for hygiene and clothing management.   Supine to sit with Modified Kay.  Toilet transfer to toilet with Modified Kay.                         Time Tracking:     OT Date of Treatment: 01/20/23  OT Start Time: 1018  OT Stop Time: 1034  OT Total Time (min): 16 min    Billable Minutes:Therapeutic Activity 16    OT/JEN: OT          1/20/2023

## 2023-01-20 NOTE — CARE UPDATE
01/19/23 2015   Patient Assessment/Suction   Level of Consciousness (AVPU) alert   Respiratory Effort Normal;Unlabored   Rhythm/Pattern, Respiratory unlabored   Cough Frequency infrequent   PRE-TX-O2   Device (Oxygen Therapy) room air   SpO2 (!) 93 %   Pulse Oximetry Type Continuous   $ Pulse Oximetry - Multiple Charge Pulse Oximetry - Multiple   Pulse 67   Resp (!) 30   Aerosol Therapy   $ Aerosol Therapy Charges Refused   Education   $ Education DME Oxygen;15 min   Respiratory Evaluation   $ Care Plan Tech Time 15 min

## 2023-01-20 NOTE — PLAN OF CARE
01/20/23 0759   Patient Assessment/Suction   Level of Consciousness (AVPU) alert   Respiratory Effort Normal;Unlabored   All Lung Fields Breath Sounds clear   Rhythm/Pattern, Respiratory no shortness of breath reported   PRE-TX-O2   Device (Oxygen Therapy) room air   SpO2 (!) 92 %   Pulse Oximetry Type Continuous   $ Pulse Oximetry - Multiple Charge Pulse Oximetry - Multiple   Pulse 60   Resp 18   /64   Aerosol Therapy   $ Aerosol Therapy Charges Refused   Respiratory Treatment Status (SVN) refused   Education   $ Education Bronchodilator;15 min   Respiratory Evaluation   $ Care Plan Tech Time 15 min

## 2023-01-20 NOTE — PLAN OF CARE
Problem: Violence Risk or Actual  Goal: Anger and Impulse Control  Outcome: Ongoing, Progressing     Problem: Adult Inpatient Plan of Care  Goal: Plan of Care Review  Outcome: Ongoing, Progressing  Goal: Patient-Specific Goal (Individualized)  Outcome: Ongoing, Progressing  Goal: Absence of Hospital-Acquired Illness or Injury  Outcome: Ongoing, Progressing  Goal: Optimal Comfort and Wellbeing  Outcome: Ongoing, Progressing  Goal: Readiness for Transition of Care  Outcome: Ongoing, Progressing     Problem: Diabetes Comorbidity  Goal: Blood Glucose Level Within Targeted Range  Outcome: Ongoing, Progressing     Problem: Fluid Imbalance (Pneumonia)  Goal: Fluid Balance  Outcome: Ongoing, Progressing     Problem: Infection (Pneumonia)  Goal: Resolution of Infection Signs and Symptoms  Outcome: Ongoing, Progressing     Problem: Respiratory Compromise (Pneumonia)  Goal: Effective Oxygenation and Ventilation  Outcome: Ongoing, Progressing     Problem: Skin Injury Risk Increased  Goal: Skin Health and Integrity  Outcome: Ongoing, Progressing     Problem: Fall Injury Risk  Goal: Absence of Fall and Fall-Related Injury  Outcome: Ongoing, Progressing     Problem: Suicidal Behavior  Goal: Suicidal Behavior is Absent or Managed  Outcome: Ongoing, Progressing     Problem: Oral Intake Inadequate  Goal: Improved Oral Intake  Outcome: Ongoing, Progressing

## 2023-01-20 NOTE — PT/OT/SLP PROGRESS
Physical Therapy Treatment    Patient Name:  Pam Gray   MRN:  [id number]    Recommendations:     Discharge Recommendations: home health PT  Discharge Equipment Recommendations: none  Barriers to discharge: None    Assessment:     Pam Gray is a 62 y.o. male admitted with a medical diagnosis of Acute on chronic combined systolic and diastolic CHF, NYHA class 1.  He presents with the following impairments/functional limitations: weakness, impaired endurance, impaired self care skills, impaired functional mobility, gait instability, impaired balance, decreased lower extremity function, pain, impaired cardiopulmonary response to activity.    Rehab Prognosis: Fair; patient would benefit from acute skilled PT services to address these deficits and reach maximum level of function.    Recent Surgery: * No surgery found *      Plan:     During this hospitalization, patient to be seen 5 x/week to address the identified rehab impairments via gait training, therapeutic activities, therapeutic exercises and progress toward the following goals:    Plan of Care Expires:       Subjective     Chief Complaint: tired  Patient/Family Comments/goals: get better  Pain/Comfort:  Pain Rating 1: 7/10  Location - Side 1: Bilateral  Location 1: leg  Pain Addressed 1: Reposition, Distraction, Cessation of Activity  Pain Rating Post-Intervention 1: 7/10      Objective:     Communicated with RN prior to session.  Patient found sitting edge of bed with telemetry, pulse ox (continuous), blood pressure cuff (sitter) upon PT entry to room.     General Precautions: Standard, fall  Orthopedic Precautions: N/A  Braces: N/A  Respiratory Status: Room air     Functional Mobility:  Bed Mobility:     Sit to Supine: stand by assistance  Transfers:     Sit to Stand:  contact guard assistance with rolling walker  Gait: x50' w/CGA and RW, c/o bilat LE pain      AM-PAC 6 CLICK MOBILITY          Treatment & Education:  Pt was educated on  the following: call light use, importance of OOB activity and functional mobility to negate the negative effects of prolonged bed rest during this hospitalization, safe transfers/ambulation and discharge planning recommendations/options.     Patient left HOB elevated with all lines intact, call button in reach, RN notified, and sitter present..    GOALS:   Multidisciplinary Problems       Physical Therapy Goals          Problem: Physical Therapy    Goal Priority Disciplines Outcome Goal Variances Interventions   Physical Therapy Goal     PT, PT/OT      Description: Goals to be met by:      Patient will increase functional independence with mobility by performin. Sit to stand transfer with Stand-by Assistance  2. Bed to chair transfer with Contact Guard Assistance using Rolling Walker  3. Gait  x 50 feet with Minimal Assistance using Rolling Walker.                          Time Tracking:     PT Received On: 23  PT Start Time: 1120     PT Stop Time: 1129  PT Total Time (min): 9 min     Billable Minutes: Gait Training 9    Treatment Type: Treatment  PT/PTA: PT           2023

## 2023-01-21 LAB
ALBUMIN SERPL BCP-MCNC: 2.8 G/DL (ref 3.5–5.2)
ALP SERPL-CCNC: 58 U/L (ref 55–135)
ALT SERPL W/O P-5'-P-CCNC: 31 U/L (ref 10–44)
ANION GAP SERPL CALC-SCNC: 7 MMOL/L (ref 8–16)
AST SERPL-CCNC: 32 U/L (ref 10–40)
BILIRUB SERPL-MCNC: 0.4 MG/DL (ref 0.1–1)
BUN SERPL-MCNC: 51 MG/DL (ref 8–23)
CALCIUM SERPL-MCNC: 8.7 MG/DL (ref 8.7–10.5)
CHLORIDE SERPL-SCNC: 107 MMOL/L (ref 95–110)
CO2 SERPL-SCNC: 24 MMOL/L (ref 23–29)
CREAT SERPL-MCNC: 2.2 MG/DL (ref 0.5–1.4)
EST. GFR  (NO RACE VARIABLE): 33 ML/MIN/1.73 M^2
GLUCOSE SERPL-MCNC: 122 MG/DL (ref 70–110)
GLUCOSE SERPL-MCNC: 129 MG/DL (ref 70–110)
GLUCOSE SERPL-MCNC: 131 MG/DL (ref 70–110)
GLUCOSE SERPL-MCNC: 143 MG/DL (ref 70–110)
GLUCOSE SERPL-MCNC: 152 MG/DL (ref 70–110)
GLUCOSE SERPL-MCNC: 174 MG/DL (ref 70–110)
GLUCOSE SERPL-MCNC: 180 MG/DL (ref 70–110)
GLUCOSE SERPL-MCNC: 194 MG/DL (ref 70–110)
GLUCOSE SERPL-MCNC: 205 MG/DL (ref 70–110)
POTASSIUM SERPL-SCNC: 4.3 MMOL/L (ref 3.5–5.1)
PROT SERPL-MCNC: 6.2 G/DL (ref 6–8.4)
SODIUM SERPL-SCNC: 138 MMOL/L (ref 136–145)

## 2023-01-21 PROCEDURE — 25000003 PHARM REV CODE 250: Performed by: INTERNAL MEDICINE

## 2023-01-21 PROCEDURE — 94761 N-INVAS EAR/PLS OXIMETRY MLT: CPT

## 2023-01-21 PROCEDURE — 82962 GLUCOSE BLOOD TEST: CPT

## 2023-01-21 PROCEDURE — 94799 UNLISTED PULMONARY SVC/PX: CPT

## 2023-01-21 PROCEDURE — 25000003 PHARM REV CODE 250

## 2023-01-21 PROCEDURE — 25000003 PHARM REV CODE 250: Performed by: NURSE PRACTITIONER

## 2023-01-21 PROCEDURE — 12000002 HC ACUTE/MED SURGE SEMI-PRIVATE ROOM

## 2023-01-21 PROCEDURE — 25000003 PHARM REV CODE 250: Performed by: STUDENT IN AN ORGANIZED HEALTH CARE EDUCATION/TRAINING PROGRAM

## 2023-01-21 PROCEDURE — 99900035 HC TECH TIME PER 15 MIN (STAT)

## 2023-01-21 PROCEDURE — 36415 COLL VENOUS BLD VENIPUNCTURE: CPT | Performed by: NURSE PRACTITIONER

## 2023-01-21 PROCEDURE — 99900031 HC PATIENT EDUCATION (STAT)

## 2023-01-21 PROCEDURE — 80053 COMPREHEN METABOLIC PANEL: CPT | Performed by: NURSE PRACTITIONER

## 2023-01-21 RX ADMIN — PANTOPRAZOLE SODIUM 40 MG: 40 TABLET, DELAYED RELEASE ORAL at 05:01

## 2023-01-21 RX ADMIN — LEVOTHYROXINE SODIUM 50 MCG: 0.03 TABLET ORAL at 05:01

## 2023-01-21 RX ADMIN — QUETIAPINE 50 MG: 25 TABLET ORAL at 09:01

## 2023-01-21 RX ADMIN — NIFEDIPINE 90 MG: 30 TABLET, FILM COATED, EXTENDED RELEASE ORAL at 09:01

## 2023-01-21 RX ADMIN — ASPIRIN 81 MG CHEWABLE TABLET 81 MG: 81 TABLET CHEWABLE at 09:01

## 2023-01-21 RX ADMIN — ACETAMINOPHEN 650 MG: 325 TABLET ORAL at 05:01

## 2023-01-21 RX ADMIN — CHLORHEXIDINE GLUCONATE 15 ML: 1.2 RINSE ORAL at 09:01

## 2023-01-21 RX ADMIN — ATORVASTATIN CALCIUM 40 MG: 40 TABLET, FILM COATED ORAL at 09:01

## 2023-01-21 RX ADMIN — MUPIROCIN 1 G: 20 OINTMENT TOPICAL at 09:01

## 2023-01-21 RX ADMIN — METOPROLOL SUCCINATE 25 MG: 25 TABLET, EXTENDED RELEASE ORAL at 09:01

## 2023-01-21 RX ADMIN — TAMSULOSIN HYDROCHLORIDE 0.4 MG: 0.4 CAPSULE ORAL at 09:01

## 2023-01-21 RX ADMIN — QUETIAPINE 50 MG: 25 TABLET ORAL at 08:01

## 2023-01-21 RX ADMIN — INSULIN DETEMIR 5 UNITS: 100 INJECTION, SOLUTION SUBCUTANEOUS at 08:01

## 2023-01-21 RX ADMIN — MUPIROCIN 1 G: 20 OINTMENT TOPICAL at 08:01

## 2023-01-21 RX ADMIN — DOCUSATE SODIUM 100 MG: 100 CAPSULE, LIQUID FILLED ORAL at 09:01

## 2023-01-21 RX ADMIN — DOXAZOSIN 2 MG: 1 TABLET ORAL at 09:01

## 2023-01-21 RX ADMIN — CHLORHEXIDINE GLUCONATE 15 ML: 1.2 RINSE ORAL at 08:01

## 2023-01-21 NOTE — PROGRESS NOTES
[facility] Medicine  Progress Note    Patient name: Pam Gray  MRN: [id number]  Admit Date: 1/16/2023   LOS: 2 days     SUBJECTIVE:     Principal problem: Acute on chronic combined systolic and diastolic CHF, NYHA class 1    Interval History:      1/18- more talkative today.  Sitter in room.  Plan to return to [place] pending further cardiology w/u for AS and poss isch heart disease    1/17- Patient is currently roomed in hallway, denies chest pain.  Shortness of breath has improved.  Was at [place] without oxygen, baseline req is 2LNC.  Difficult historian.  Follow up cards recs.     1/19  patient seen examined today.  Denies any shortness of breath, chest pain, dizziness, lightheadedness.  He says he requires oxygen at home but was not taking it at behavioral center.     1/20:  No overnight events reported.  Patient does the requirement for home oxygen.    1/21 no overnight events reported.  Doing well today.    Scheduled Meds:   arformoteroL  15 mcg Nebulization BID    aspirin  81 mg Oral Daily    atorvastatin  40 mg Oral Daily    budesonide  0.5 mg Nebulization Q12H    chlorhexidine  15 mL Mouth/Throat BID    docusate sodium  100 mg Oral Daily    doxazosin  2 mg Oral Daily    fluvoxaMINE  25 mg Oral QHS    insulin detemir U-100  5 Units Subcutaneous QHS    ipratropium  0.5 mg Nebulization Q6H WAKE    levothyroxine  50 mcg Oral Before breakfast    metoprolol succinate  25 mg Oral Daily    mupirocin   Nasal BID    NIFEdipine  90 mg Oral Daily    pantoprazole  40 mg Oral Before breakfast    polyethylene glycol  17 g Oral Daily    QUEtiapine  50 mg Oral BID    tamsulosin  0.4 mg Oral Daily     Continuous Infusions:  PRN Meds:acetaminophen, clonazePAM, dextrose 10%, dextrose 10%, glucagon (human recombinant), glucose, glucose, insulin aspart U-100, ipratropium, naloxone, sodium chloride 0.9%    Review of patient's allergies indicates:   Allergen Reactions    Onion Other (See Comments)      THROAT SWELLS    Pork/porcine containing products      Confucianist PREFERENCE    Shellfish containing products      Confucianist PREFERENCE      Shrimp      Confucianist PREFERENCE         Review of Systems: As per interval history    OBJECTIVE:     Vital Signs (Most Recent)  Temp: 98.8 °F (37.1 °C) (01/21/23 0743)  Pulse: 64 (01/21/23 1100)  Resp: 18 (01/21/23 1100)  BP: 127/72 (01/21/23 1100)  SpO2: 96 % (01/21/23 1100)    Vital Signs Range (Last 24H):  Temp:  [98.5 °F (36.9 °C)-98.8 °F (37.1 °C)]   Pulse:  [56-65]   Resp:  [16-36]   BP: (113-141)/(53-72)   SpO2:  [93 %-99 %]     I & O (Last 24H):  Intake/Output Summary (Last 24 hours) at 1/21/2023 1327  Last data filed at 1/21/2023 1000  Gross per 24 hour   Intake 220 ml   Output 1800 ml   Net -1580 ml       Physical Exam:  General: Patient resting eyes closed, arouses easily  Eyes: No conjunctival injection. No scleral icterus.  ENT: Hearing grossly intact. No discharge from ears. No nasal discharge.   CVS: RRR. trace LE edema BL  Lungs:  No tachypnea or accessory muscle use.  Clear to auscultation bilaterally  Abdomen:  Soft, nontender and nondistended.  No organomegaly  Neuro: Alert. Speech is slow and slurred. Moves all extremities. Follows commands.       Laboratory:  I have reviewed all pertinent lab results within the past 24 hours.    Diagnostic Results:  Labs: Reviewed  ECG: Reviewed  X-Ray: Reviewed  Echo: Reviewed    ASSESSMENT/PLAN:     Shortness of breath  COPD  HFpEF  Severe aortic stenosis  Restart home controllers for copd- pt refusing  Duonebs prn  Continue IV lasix 40 mg daily  Echo w/EF 60%, suspect some degree of diastolic dysfx as well as severe AS  Cardiology consulted  Considering possible SAVR vs TAVR, but would be fairly high risk   Patient appears to be at his baseline clinical status.  He is supposed to be on oxygen chronically which he was not on at home    BREA/CKD  Hyperkalemia   - resolved    Dispo   Medically cleared however looks like  behavioral will  not take him with oxygen.    Active Hospital Problems    Diagnosis  POA    *Acute on chronic combined systolic and diastolic CHF, NYHA class 1 [I50.43]  Yes    Hyponatremia [E87.1]  Yes    Acute respiratory failure with hypoxia [J96.01]  Yes    Type 2 diabetes mellitus with neurologic complication, with long-term current use of insulin [E11.49, Z79.4]  Not Applicable    Steroid-induced hyperglycemia [R73.9, T38.0X5A]  Yes    MDD (major depressive disorder), recurrent episode, moderate [F33.1]  Yes    Microcytic anemia [D50.9]  Yes    Vascular dementia with delirium [F01.50, F05]  Yes    Methamphetamine / cocaine dependence [F15.20]  Yes    Hypothyroid [E03.9]  Yes    CAD (coronary artery disease) [I25.10]  Yes    Hyperkalemia [E87.5]  Yes    Stage 3 severe COPD by GOLD classification [J44.9]  Yes    Hypertension [I10]  Yes    AYDEE on CPAP [G47.33, Z99.89]  Not Applicable      Resolved Hospital Problems   No resolved problems to display.               VTE Risk Mitigation (From admission, onward)           Ordered     Reason for No Pharmacological VTE Prophylaxis  Once        Question:  Reasons:  Answer:  Physician Provided (leave comment)    01/16/23 1353     Place RADHA hose  Until discontinued         01/16/23 1353     IP VTE HIGH RISK PATIENT  Once         01/16/23 1353     Place sequential compression device  Until discontinued         01/16/23 1353                        Department Hospital Medicine  [facility]  Chun Bokoer MD

## 2023-01-21 NOTE — PROGRESS NOTES
Nephrology Progress Note        Patient Name: Pam Gray  MRN: [id number]    Patient Class: IP- Inpatient   Admission Date: 1/16/2023  Length of Stay: 2 days  Date of Service: 1/21/2023    Attending Physician: Chun Oliver MD  Primary Care Provider: Jacob Zuniga MD    Reason for Consult: sob/brea/hyperkalemia/hyponatremia/acidosis/chf/anemia/thn    SUBJECTIVE:     HPI: 62M with past medical history of anxiety, arthritis, CHF, CKD stage 3, COPD, CAD, diabetes mellitus, peripheral neuropathy, CVA presented initially on 1/10 to the emergency department at [facility] (formerly [facility]3] in [place], [place] via ambulance with complaints of SI and depression. He was sent PECed to [facility] and now brought to [facility] with SOB. Labs show BREA, hyperkalemia. ABG with slight acidosis.    1/17  VSS, K+ at goal.  1.7L UOP recorded.  No complaints.  1/18 VSS. Appreciate cards recommendations and agree with plan.  1/19 genie, BP stable, on 3L NC, UOP 1.8L  1/20 VSS, on 1L NC, UOP 1.4L, no complaints  1/21 no events overnight    Outpatient meds:  No current facility-administered medications on file prior to encounter.     Current Outpatient Medications on File Prior to Encounter   Medication Sig Dispense Refill    albuterol-ipratropium (DUO-NEB) 2.5 mg-0.5 mg/3 mL nebulizer solution Take 3 mLs by nebulization every 6 (six) hours while awake. Rescue 270 mL 0    aspirin 81 MG Chew Take 1 tablet (81 mg total) by mouth once daily. 30 tablet 0    atorvastatin (LIPITOR) 40 MG tablet Take 1 tablet (40 mg total) by mouth once daily. 30 tablet 0    clonazePAM (KLONOPIN) 0.5 MG tablet Take 0.5 mg by mouth 2 (two) times daily as needed for Anxiety.      doxazosin (CARDURA) 2 MG tablet Take 1 tablet (2 mg total) by mouth once daily. 30 tablet 0    fluticasone furoate-vilanteroL (BREO) 100-25 mcg/dose diskus inhaler Inhale 1 puff into the lungs once daily. Controller 30 each 0    fluvoxaMINE (LUVOX) 25 MG tablet Take 1 tablet (25 mg total)  by mouth every evening. 90 tablet 1    gabapentin (NEURONTIN) 300 MG capsule Take 1 capsule by mouth once daily.      insulin aspart U-100 (NOVOLOG) 100 unit/mL (3 mL) InPn pen Inject 1-10 Units into the skin before meals and at bedtime as needed (Hyperglycemia).  0    insulin detemir U-100 (LEVEMIR FLEXTOUCH U-100 INSULN) 100 unit/mL (3 mL) InPn pen Inject 10 Units into the skin every evening.  0    levothyroxine (SYNTHROID) 50 MCG tablet Take 1 tablet (50 mcg total) by mouth once daily.      metoprolol succinate (TOPROL-XL) 25 MG 24 hr tablet Take 1 tablet (25 mg total) by mouth once daily. 30 tablet 0    NIFEdipine (PROCARDIA-XL) 90 MG (OSM) 24 hr tablet Take 1 tablet (90 mg total) by mouth once daily. 30 tablet 0    pantoprazole (PROTONIX) 40 MG tablet Take 1 tablet (40 mg total) by mouth once daily. 30 tablet 0    QUEtiapine (SEROQUEL) 50 MG tablet Take 1 tablet (50 mg total) by mouth 2 (two) times daily. 60 tablet 11    tamsulosin (FLOMAX) 0.4 mg Cap Take 1 capsule (0.4 mg total) by mouth once daily.      tiotropium bromide (SPIRIVA RESPIMAT) 2.5 mcg/actuation inhaler Inhale 2 puffs into the lungs once daily. Controller 4 g 0    [DISCONTINUED] albuterol (PROVENTIL) 2.5 mg /3 mL (0.083 %) nebulizer solution USE 1 VIAL VIA NEBULIZER EVERY 6 HOURS AS NEEDED 7/9/21      [DISCONTINUED] amLODIPine (NORVASC) 10 MG tablet Take 1 tablet (10 mg total) by mouth once daily. 30 tablet 11    [DISCONTINUED] furosemide (LASIX) 80 MG tablet Take 1 tablet (80 mg total) by mouth 2 (two) times daily. 60 tablet 0    [DISCONTINUED] isosorbide-hydrALAZINE 20-37.5 mg (BIDIL) 20-37.5 mg Tab Take 1 tablet by mouth 2 (two) times daily. 60 tablet 0    [DISCONTINUED] metoprolol tartrate (LOPRESSOR) 50 MG tablet Take 1 tablet (50 mg total) by mouth 2 (two) times daily. 60 tablet 11    [DISCONTINUED] mirtazapine (REMERON) 30 MG tablet Take 30 mg by mouth every evening.      [DISCONTINUED] omeprazole (PRILOSEC) 40 MG capsule Take 1 capsule  (40 mg total) by mouth once daily. 30 capsule 11    [DISCONTINUED] pioglitazone (ACTOS) 30 MG tablet Take 30 mg by mouth once daily.      [DISCONTINUED] rivastigmine (EXELON) 4.6 mg/24 hour PT24 Place 1 patch onto the skin once daily. 30 patch 11    [DISCONTINUED] SITagliptin (JANUVIA) 100 MG Tab Take 100 mg by mouth once daily.      [DISCONTINUED] traZODone (DESYREL) 50 MG tablet Take 50 mg by mouth every evening.         Scheduled meds:   arformoteroL  15 mcg Nebulization BID    aspirin  81 mg Oral Daily    atorvastatin  40 mg Oral Daily    budesonide  0.5 mg Nebulization Q12H    chlorhexidine  15 mL Mouth/Throat BID    docusate sodium  100 mg Oral Daily    doxazosin  2 mg Oral Daily    fluvoxaMINE  25 mg Oral QHS    insulin detemir U-100  5 Units Subcutaneous QHS    ipratropium  0.5 mg Nebulization Q6H WAKE    levothyroxine  50 mcg Oral Before breakfast    metoprolol succinate  25 mg Oral Daily    mupirocin   Nasal BID    NIFEdipine  90 mg Oral Daily    pantoprazole  40 mg Oral Before breakfast    polyethylene glycol  17 g Oral Daily    QUEtiapine  50 mg Oral BID    tamsulosin  0.4 mg Oral Daily       Infusions:      PRN meds:      Review of Systems:  Neg    OBJECTIVE:     Vital Signs and IO:  Temp:  [98.5 °F (36.9 °C)-98.8 °F (37.1 °C)]   Pulse:  [56-65]   Resp:  [14-36]   BP: (113-141)/(53-65)   SpO2:  [93 %-99 %]   I/O last 3 completed shifts:  In: 240 [P.O.:240]  Out: 2275 [Urine:2275]  Wt Readings from Last 5 Encounters:   01/17/23 84.1 kg (185 lb 8 oz)   01/16/23 90.7 kg (200 lb)   01/10/23 89.4 kg (197 lb)   09/14/22 89.5 kg (197 lb 4.8 oz)   08/25/22 91.2 kg (201 lb)     Body mass index is 27.39 kg/m².    Physical Exam  Constitutional:       General: He is not in acute distress.     Appearance: He is well-developed. She is not diaphoretic.   HENT:      Head: Normocephalic and atraumatic.      Mouth/Throat:      Mouth: Mucous membranes are moist.   Eyes:      General: No scleral icterus.     Pupils: Pupils  are equal, round, and reactive to light.   Cardiovascular:      Rate and Rhythm: Normal rate and regular rhythm.   Pulmonary:      Effort: Pulmonary effort is normal. No respiratory distress.      Breath sounds: No stridor.   Abdominal:      General: There is no distension.      Palpations: Abdomen is soft.   Musculoskeletal:         General: No deformity. Normal range of motion.      Cervical back: Neck supple.   Skin:     General: Skin is warm and dry.      Findings: No rash present. No erythema.   Neurological:      Normal  Psychiatric:         Normal    Laboratory:  Recent Labs   Lab 01/19/23  0449 01/20/23  0508 01/21/23  0434   * 135* 138   K 4.4 4.4 4.3    104 107   CO2 26 26 24   BUN 68* 55* 51*   CREATININE 2.9* 2.4* 2.2*   * 114* 129*         Recent Labs   Lab 01/16/23  1250 01/16/23  1542 01/16/23  1832 01/18/23  0421 01/19/23  0449 01/20/23  0508 01/21/23  0434   CALCIUM 8.4*  --    < > 8.6* 8.3* 8.6* 8.7   ALBUMIN 3.2*  --    < > 2.8* 2.8* 2.8* 2.8*   PHOS 4.6*  --   --   --   --   --   --    MG  --  2.6  --  2.5  --   --   --     < > = values in this interval not displayed.         Recent Labs   Lab 07/10/21  1030 07/11/21  0625   PTH, Intact 92.2 H 129.7 H         No results for input(s): POCTGLUCOSE in the last 168 hours.    Recent Labs   Lab 07/26/22  0631 09/14/22  1526 01/16/23  1807   Hemoglobin A1C 6.1 H 6.1 H 6.6 H         Recent Labs   Lab 01/16/23  1015 01/16/23  1807   WBC 8.03 7.16   HGB 10.4* 9.7*   HCT 34.0* 31.9*    127*   MCV 79* 78*   MCHC 30.6* 30.4*   MONO 9.7  0.8 9.8  0.7         Recent Labs   Lab 01/19/23  0449 01/20/23  0508 01/21/23  0434   BILITOT 0.8 0.5 0.4   PROT 6.1 6.2 6.2   ALBUMIN 2.8* 2.8* 2.8*   ALKPHOS 68 61 58   ALT 23 32 31   AST 27 36 32         Recent Labs   Lab 07/25/22  1726 09/14/22  1539 01/10/23  1035 01/16/23  1401   Color, UA Yellow Yellow Yellow Yellow   Appearance, UA Clear Cloudy A Clear Clear   pH, UA 6.0 5.0 6.0 6.0    Specific Gravity, UA 1.020 1.020 >=1.030 A 1.010   Protein, UA 3+ A 2+ A 3+ A 1+ A   Glucose, UA 1+ A Negative Negative Negative   Ketones, UA Negative Negative Negative Negative   Urobilinogen, UA Negative 1.0  --  Negative   Bilirubin (UA) Negative Negative 1+ A Negative   Occult Blood UA Trace A Negative Trace A Negative   Nitrite, UA Negative Negative Negative Negative   RBC, UA 0 4 1 1   WBC, UA 4 3 6 H 14 H   Bacteria Rare Negative None Negative   Hyaline Casts, UA 13 A 3.5 A 1 5 A         Recent Labs   Lab 04/06/22  0953 09/14/22  1550 01/16/23  1047   POC PH 7.365 7.409 7.323 L   POC PCO2 39.9 25.8 L 38.9   POC HCO3 22.3 L 18.5 20.2 L   POC PO2 60.9 L 58.8 LL 60 L   POC SATURATED O2  --  93.9 89 L   POC BE  --   --  -6   Sample  --   --  ARTERIAL         Microbiology Results (last 7 days)       ** No results found for the last 168 hours. **          Echo:  The left ventricle is normal in size with moderate concentric hypertrophy and normal systolic function.  The estimated ejection fraction is 60%.  Indeterminate left ventricular diastolic function.  Normal right ventricular size with normal right ventricular systolic function.  Mild left atrial enlargement.  There is severe aortic valve stenosis.  Aortic valve area is 0.98 cm2; peak velocity is 4.2 m/s; mean gradient is 45 mmHg.  Mild tricuspid regurgitation.  Normal central venous pressure (3 mmHg).  The estimated PA systolic pressure is 16 mmHg.  Mildly elevated mean gradient across mitral valve with normal mitral valve area by pressure half time.     ASSESSMENT/PLAN:     BREA  CKD stage 3, baseline sCr around 2  BPH  - renal function improving   - nonoliguric  - continue flomax  - no nsaids or IV contrast  - dose meds for CrCl < 30    Severe AS  Valvular CHF  - trop improved with diuresis  - can do lasix PRN    SHPT  Anemia of CKD  - stable    Thank you for allowing us to participate in the care of your patient!   We will follow the patient and provide  recommendations as needed.    Patient care time was spent personally by me on the following activities: > 35 min    Obtaining a history.  Examination of patient.  Providing medical care at the patients bedside.  Developing a treatment plan with patient or surrogate and bedside caregivers.  Ordering and reviewing laboratory studies, radiographic studies, pulse oximetry.  Ordering and performing treatments and interventions.  Evaluation of patient's response to treatment.  Discussions with consultants while on the unit and immediately available to the patient.  Re-evaluation of the patient's condition.  Documentation in the medical record.     Kenzie Tang MD      [facility]  [address] [place] [phone]    [phone] - tel  [phone] - fax    1/21/2023

## 2023-01-21 NOTE — PLAN OF CARE
01/21/23 0848   Patient Assessment/Suction   Level of Consciousness (AVPU) alert   Respiratory Effort Unlabored   Expansion/Accessory Muscles/Retractions expansion symmetric;no use of accessory muscles   All Lung Fields Breath Sounds clear   Rhythm/Pattern, Respiratory no shortness of breath reported   PRE-TX-O2   Device (Oxygen Therapy) room air   SpO2 96 %   Pulse Oximetry Type Continuous   $ Pulse Oximetry - Multiple Charge Pulse Oximetry - Multiple   Pulse (!) 57   Resp (!) 26   Aerosol Therapy   $ Aerosol Therapy Charges Refused   Respiratory Treatment Status (SVN) refused   Education   $ Education Bronchodilator;15 min   Respiratory Evaluation   $ Care Plan Tech Time 15 min

## 2023-01-22 LAB
ALBUMIN SERPL BCP-MCNC: 2.8 G/DL (ref 3.5–5.2)
ALP SERPL-CCNC: 57 U/L (ref 55–135)
ALT SERPL W/O P-5'-P-CCNC: 31 U/L (ref 10–44)
ANION GAP SERPL CALC-SCNC: 6 MMOL/L (ref 8–16)
AST SERPL-CCNC: 33 U/L (ref 10–40)
BILIRUB SERPL-MCNC: 0.2 MG/DL (ref 0.1–1)
BUN SERPL-MCNC: 42 MG/DL (ref 8–23)
CALCIUM SERPL-MCNC: 8.7 MG/DL (ref 8.7–10.5)
CHLORIDE SERPL-SCNC: 110 MMOL/L (ref 95–110)
CO2 SERPL-SCNC: 23 MMOL/L (ref 23–29)
CREAT SERPL-MCNC: 2 MG/DL (ref 0.5–1.4)
EST. GFR  (NO RACE VARIABLE): 37 ML/MIN/1.73 M^2
GLUCOSE SERPL-MCNC: 115 MG/DL (ref 70–110)
GLUCOSE SERPL-MCNC: 133 MG/DL (ref 70–110)
GLUCOSE SERPL-MCNC: 178 MG/DL (ref 70–110)
GLUCOSE SERPL-MCNC: 180 MG/DL (ref 70–110)
GLUCOSE SERPL-MCNC: 99 MG/DL (ref 70–110)
POTASSIUM SERPL-SCNC: 4.1 MMOL/L (ref 3.5–5.1)
PROT SERPL-MCNC: 6.1 G/DL (ref 6–8.4)
SODIUM SERPL-SCNC: 139 MMOL/L (ref 136–145)

## 2023-01-22 PROCEDURE — 36415 COLL VENOUS BLD VENIPUNCTURE: CPT | Performed by: NURSE PRACTITIONER

## 2023-01-22 PROCEDURE — 25000003 PHARM REV CODE 250: Performed by: STUDENT IN AN ORGANIZED HEALTH CARE EDUCATION/TRAINING PROGRAM

## 2023-01-22 PROCEDURE — 25000003 PHARM REV CODE 250

## 2023-01-22 PROCEDURE — 80053 COMPREHEN METABOLIC PANEL: CPT | Performed by: NURSE PRACTITIONER

## 2023-01-22 PROCEDURE — 25000003 PHARM REV CODE 250: Performed by: NURSE PRACTITIONER

## 2023-01-22 PROCEDURE — 82962 GLUCOSE BLOOD TEST: CPT

## 2023-01-22 PROCEDURE — 12000002 HC ACUTE/MED SURGE SEMI-PRIVATE ROOM

## 2023-01-22 RX ORDER — BUDESONIDE 0.5 MG/2ML
0.5 INHALANT ORAL EVERY 12 HOURS PRN
Status: DISCONTINUED | OUTPATIENT
Start: 2023-01-22 | End: 2023-01-24 | Stop reason: HOSPADM

## 2023-01-22 RX ORDER — ARFORMOTEROL TARTRATE 15 UG/2ML
15 SOLUTION RESPIRATORY (INHALATION) 2 TIMES DAILY PRN
Status: DISCONTINUED | OUTPATIENT
Start: 2023-01-22 | End: 2023-01-24 | Stop reason: HOSPADM

## 2023-01-22 RX ORDER — IPRATROPIUM BROMIDE 0.5 MG/2.5ML
0.5 SOLUTION RESPIRATORY (INHALATION) 4 TIMES DAILY PRN
Status: DISCONTINUED | OUTPATIENT
Start: 2023-01-22 | End: 2023-01-24 | Stop reason: HOSPADM

## 2023-01-22 RX ADMIN — CHLORHEXIDINE GLUCONATE 15 ML: 1.2 RINSE ORAL at 08:01

## 2023-01-22 RX ADMIN — METOPROLOL SUCCINATE 25 MG: 25 TABLET, EXTENDED RELEASE ORAL at 08:01

## 2023-01-22 RX ADMIN — POLYETHYLENE GLYCOL 3350 17 G: 17 POWDER, FOR SOLUTION ORAL at 04:01

## 2023-01-22 RX ADMIN — ASPIRIN 81 MG CHEWABLE TABLET 81 MG: 81 TABLET CHEWABLE at 08:01

## 2023-01-22 RX ADMIN — TAMSULOSIN HYDROCHLORIDE 0.4 MG: 0.4 CAPSULE ORAL at 08:01

## 2023-01-22 RX ADMIN — PANTOPRAZOLE SODIUM 40 MG: 40 TABLET, DELAYED RELEASE ORAL at 05:01

## 2023-01-22 RX ADMIN — DOXAZOSIN 2 MG: 1 TABLET ORAL at 08:01

## 2023-01-22 RX ADMIN — CHLORHEXIDINE GLUCONATE 15 ML: 1.2 RINSE ORAL at 09:01

## 2023-01-22 RX ADMIN — QUETIAPINE 50 MG: 25 TABLET ORAL at 09:01

## 2023-01-22 RX ADMIN — MUPIROCIN 1 G: 20 OINTMENT TOPICAL at 08:01

## 2023-01-22 RX ADMIN — MUPIROCIN 1 G: 20 OINTMENT TOPICAL at 09:01

## 2023-01-22 RX ADMIN — DOCUSATE SODIUM 100 MG: 100 CAPSULE, LIQUID FILLED ORAL at 08:01

## 2023-01-22 RX ADMIN — QUETIAPINE 50 MG: 25 TABLET ORAL at 08:01

## 2023-01-22 RX ADMIN — NIFEDIPINE 90 MG: 30 TABLET, FILM COATED, EXTENDED RELEASE ORAL at 08:01

## 2023-01-22 RX ADMIN — ATORVASTATIN CALCIUM 40 MG: 40 TABLET, FILM COATED ORAL at 08:01

## 2023-01-22 RX ADMIN — INSULIN DETEMIR 5 UNITS: 100 INJECTION, SOLUTION SUBCUTANEOUS at 09:01

## 2023-01-22 RX ADMIN — LEVOTHYROXINE SODIUM 50 MCG: 0.03 TABLET ORAL at 05:01

## 2023-01-22 NOTE — NURSING
Pt ate 100% of breakfast and ambulated in room. No complaints noetd. Denies SI or HI at this time.

## 2023-01-22 NOTE — CARE UPDATE
01/21/23 2000   Patient Assessment/Suction   Level of Consciousness (AVPU) alert   Respiratory Effort Normal;Unlabored   Expansion/Accessory Muscles/Retractions no use of accessory muscles   All Lung Fields Breath Sounds clear   Rhythm/Pattern, Respiratory unlabored;pattern regular   PRE-TX-O2   Device (Oxygen Therapy) room air   SpO2 98 %   Pulse Oximetry Type Intermittent   $ Pulse Oximetry - Multiple Charge Pulse Oximetry - Multiple   Pulse 69   Resp 18   Aerosol Therapy   $ Aerosol Therapy Charges Refused   Education   $ Education Bronchodilator;15 min   Respiratory Evaluation   $ Care Plan Tech Time 15 min   $ Eval/Re-eval Charges Evaluation

## 2023-01-22 NOTE — PROGRESS NOTES
[facility]  Progress Note    Patient name: Pam Gray  MRN: [id number]  Admit Date: 1/16/2023   LOS: 3 days     SUBJECTIVE:     Principal problem: Acute on chronic combined systolic and diastolic CHF, NYHA class 1    Interval History:      1/18- more talkative today.  Sitter in room.  Plan to return to [facility] pending further cardiology w/u for AS and poss isch heart disease    1/17- Patient is currently roomed in hallway, denies chest pain.  Shortness of breath has improved.  Was at [facility] without oxygen, baseline req is 2LNC.  Difficult historian.  Follow up cards recs.     1/19  patient seen examined today.  Denies any shortness of breath, chest pain, dizziness, lightheadedness.  He says he requires oxygen at home but was not taking it at behavioral center.     1/20:  No overnight events reported.  Patient does the requirement for home oxygen.    1/21 no overnight events reported.  Doing well today.    1/22 Off oxygen today.  Saturating well on room air.  Comfortable.  No complaints.  Scheduled Meds:   aspirin  81 mg Oral Daily    atorvastatin  40 mg Oral Daily    chlorhexidine  15 mL Mouth/Throat BID    docusate sodium  100 mg Oral Daily    doxazosin  2 mg Oral Daily    fluvoxaMINE  25 mg Oral QHS    insulin detemir U-100  5 Units Subcutaneous QHS    levothyroxine  50 mcg Oral Before breakfast    metoprolol succinate  25 mg Oral Daily    mupirocin   Nasal BID    NIFEdipine  90 mg Oral Daily    pantoprazole  40 mg Oral Before breakfast    polyethylene glycol  17 g Oral Daily    QUEtiapine  50 mg Oral BID    tamsulosin  0.4 mg Oral Daily     Continuous Infusions:  PRN Meds:acetaminophen, arformoteroL, budesonide, clonazePAM, dextrose 10%, dextrose 10%, glucagon (human recombinant), glucose, glucose, insulin aspart U-100, ipratropium, ipratropium, naloxone, sodium chloride 0.9%    Review of patient's allergies indicates:   Allergen Reactions    Onion Other (See Comments)      THROAT SWELLS    Pork/porcine containing products      Jew PREFERENCE    Shellfish containing products      Jew PREFERENCE      Shrimp      Jew PREFERENCE         Review of Systems: As per interval history    OBJECTIVE:     Vital Signs (Most Recent)  Temp: 98 °F (36.7 °C) (01/22/23 1134)  Pulse: 61 (01/22/23 1134)  Resp: 18 (01/22/23 1134)  BP: 137/65 (01/22/23 1134)  SpO2: 98 % (01/22/23 1134)    Vital Signs Range (Last 24H):  Temp:  [97.9 °F (36.6 °C)-98.9 °F (37.2 °C)]   Pulse:  [56-72]   Resp:  [18]   BP: (117-139)/(57-89)   SpO2:  [97 %-98 %]     I & O (Last 24H):  Intake/Output Summary (Last 24 hours) at 1/22/2023 1413  Last data filed at 1/22/2023 0718  Gross per 24 hour   Intake 120 ml   Output 200 ml   Net -80 ml       Physical Exam:  General: Patient resting eyes closed, arouses easily  Eyes: No conjunctival injection. No scleral icterus.  ENT: Hearing grossly intact. No discharge from ears. No nasal discharge.   CVS: RRR.  Lungs:  No tachypnea or accessory muscle use.  Clear to auscultation bilaterally  Abdomen:  Soft, nontender and nondistended.  No organomegaly  Neuro: Alert. Speech is slow and slurred. Moves all extremities. Follows commands.       Laboratory:  I have reviewed all pertinent lab results within the past 24 hours.    Diagnostic Results:  Labs: Reviewed  ECG: Reviewed  X-Ray: Reviewed  Echo: Reviewed    ASSESSMENT/PLAN:     Shortness of breath  COPD  HFpEF  Severe aortic stenosis  Restart home controllers for copd- pt refusing  Duonebs prn  Continue IV lasix 40 mg daily  Echo w/EF 60%, suspect some degree of diastolic dysfx as well as severe AS  Cardiology consulted  Considering possible SAVR vs TAVR, but would be fairly high risk   Patient appears to be at his baseline clinical status.  He is supposed to be on oxygen chronically which he was not on at home    BREA/CKD  Hyperkalemia   - resolved    Dispo   Medically cleared however looks like behavioral will  not take him  with oxygen.    Active Hospital Problems    Diagnosis  POA    *Acute on chronic combined systolic and diastolic CHF, NYHA class 1 [I50.43]  Yes    Hyponatremia [E87.1]  Yes    Acute respiratory failure with hypoxia [J96.01]  Yes    Type 2 diabetes mellitus with neurologic complication, with long-term current use of insulin [E11.49, Z79.4]  Not Applicable    Steroid-induced hyperglycemia [R73.9, T38.0X5A]  Yes    MDD (major depressive disorder), recurrent episode, moderate [F33.1]  Yes    Microcytic anemia [D50.9]  Yes    Vascular dementia with delirium [F01.50, F05]  Yes    Methamphetamine / cocaine dependence [F15.20]  Yes    Hypothyroid [E03.9]  Yes    CAD (coronary artery disease) [I25.10]  Yes    Hyperkalemia [E87.5]  Yes    Stage 3 severe COPD by GOLD classification [J44.9]  Yes    Hypertension [I10]  Yes    AYDEE on CPAP [G47.33, Z99.89]  Not Applicable      Resolved Hospital Problems   No resolved problems to display.               VTE Risk Mitigation (From admission, onward)           Ordered     Reason for No Pharmacological VTE Prophylaxis  Once        Question:  Reasons:  Answer:  Physician Provided (leave comment)    01/16/23 1353     Place RADHA hose  Until discontinued         01/16/23 1353     IP VTE HIGH RISK PATIENT  Once         01/16/23 1353     Place sequential compression device  Until discontinued         01/16/23 1353                        Department Hospital Medicine  [facility]  Chun Booker MD

## 2023-01-22 NOTE — PLAN OF CARE
Problem: Violence Risk or Actual  Goal: Anger and Impulse Control  Outcome: Ongoing, Progressing     Problem: Adult Inpatient Plan of Care  Goal: Plan of Care Review  Outcome: Ongoing, Progressing  Goal: Patient-Specific Goal (Individualized)  Outcome: Ongoing, Progressing  Goal: Absence of Hospital-Acquired Illness or Injury  Outcome: Ongoing, Progressing  Goal: Optimal Comfort and Wellbeing  Outcome: Ongoing, Progressing  Goal: Readiness for Transition of Care  Outcome: Ongoing, Progressing     Problem: Diabetes Comorbidity  Goal: Blood Glucose Level Within Targeted Range  Outcome: Ongoing, Progressing     Problem: Fluid Imbalance (Pneumonia)  Goal: Fluid Balance  Outcome: Ongoing, Progressing     Problem: Infection (Pneumonia)  Goal: Resolution of Infection Signs and Symptoms  Outcome: Ongoing, Progressing     Problem: Respiratory Compromise (Pneumonia)  Goal: Effective Oxygenation and Ventilation  Outcome: Ongoing, Progressing     Problem: Skin Injury Risk Increased  Goal: Skin Health and Integrity  Outcome: Ongoing, Progressing     Problem: Fall Injury Risk  Goal: Absence of Fall and Fall-Related Injury  Outcome: Ongoing, Progressing     Problem: Suicidal Behavior  Goal: Suicidal Behavior is Absent or Managed  Outcome: Ongoing, Progressing     Problem: Oral Intake Inadequate  Goal: Improved Oral Intake  Outcome: Ongoing, Progressing   Plan of care reviewed with patient

## 2023-01-22 NOTE — PROGRESS NOTES
Nephrology Progress Note        Patient Name: Pam Gray  MRN: [id number]    Patient Class: IP- Inpatient   Admission Date: 1/16/2023  Length of Stay: 3 days  Date of Service: 1/22/2023    Attending Physician: Chun Oliver MD  Primary Care Provider: Jacob Zuniga MD    Reason for Consult: sob/brea/hyperkalemia/hyponatremia/acidosis/chf/anemia/thn    SUBJECTIVE:     HPI: 62M with past medical history of anxiety, arthritis, CHF, CKD stage 3, COPD, CAD, diabetes mellitus, peripheral neuropathy, CVA presented initially on 1/10 to the emergency department at [facility] in [place], [place] via ambulance with complaints of SI and depression. He was sent PECed to [facility] and now brought to [facility] with SOB. Labs show BREA, hyperkalemia. ABG with slight acidosis.    1/17  VSS, K+ at goal.  1.7L UOP recorded.  No complaints.  1/18 VSS. Appreciate cards recommendations and agree with plan.  1/19 genie, BP stable, on 3L NC, UOP 1.8L  1/20 VSS, on 1L NC, UOP 1.4L, no complaints  1/21 no events overnight  1/22  mildly bradycardic, pressures stable, renal function improving.  UOP adequate.  Sleeping, NAD noted.  Awaiting psych placement.    Outpatient meds:  No current facility-administered medications on file prior to encounter.     Current Outpatient Medications on File Prior to Encounter   Medication Sig Dispense Refill    albuterol-ipratropium (DUO-NEB) 2.5 mg-0.5 mg/3 mL nebulizer solution Take 3 mLs by nebulization every 6 (six) hours while awake. Rescue 270 mL 0    aspirin 81 MG Chew Take 1 tablet (81 mg total) by mouth once daily. 30 tablet 0    atorvastatin (LIPITOR) 40 MG tablet Take 1 tablet (40 mg total) by mouth once daily. 30 tablet 0    clonazePAM (KLONOPIN) 0.5 MG tablet Take 0.5 mg by mouth 2 (two) times daily as needed for Anxiety.      doxazosin (CARDURA) 2 MG tablet Take 1 tablet (2 mg total) by mouth once daily. 30 tablet 0    fluticasone furoate-vilanteroL (BREO) 100-25 mcg/dose diskus  inhaler Inhale 1 puff into the lungs once daily. Controller 30 each 0    fluvoxaMINE (LUVOX) 25 MG tablet Take 1 tablet (25 mg total) by mouth every evening. 90 tablet 1    gabapentin (NEURONTIN) 300 MG capsule Take 1 capsule by mouth once daily.      insulin aspart U-100 (NOVOLOG) 100 unit/mL (3 mL) InPn pen Inject 1-10 Units into the skin before meals and at bedtime as needed (Hyperglycemia).  0    insulin detemir U-100 (LEVEMIR FLEXTOUCH U-100 INSULN) 100 unit/mL (3 mL) InPn pen Inject 10 Units into the skin every evening.  0    levothyroxine (SYNTHROID) 50 MCG tablet Take 1 tablet (50 mcg total) by mouth once daily.      metoprolol succinate (TOPROL-XL) 25 MG 24 hr tablet Take 1 tablet (25 mg total) by mouth once daily. 30 tablet 0    NIFEdipine (PROCARDIA-XL) 90 MG (OSM) 24 hr tablet Take 1 tablet (90 mg total) by mouth once daily. 30 tablet 0    pantoprazole (PROTONIX) 40 MG tablet Take 1 tablet (40 mg total) by mouth once daily. 30 tablet 0    QUEtiapine (SEROQUEL) 50 MG tablet Take 1 tablet (50 mg total) by mouth 2 (two) times daily. 60 tablet 11    tamsulosin (FLOMAX) 0.4 mg Cap Take 1 capsule (0.4 mg total) by mouth once daily.      tiotropium bromide (SPIRIVA RESPIMAT) 2.5 mcg/actuation inhaler Inhale 2 puffs into the lungs once daily. Controller 4 g 0    [DISCONTINUED] albuterol (PROVENTIL) 2.5 mg /3 mL (0.083 %) nebulizer solution USE 1 VIAL VIA NEBULIZER EVERY 6 HOURS AS NEEDED 7/9/21      [DISCONTINUED] amLODIPine (NORVASC) 10 MG tablet Take 1 tablet (10 mg total) by mouth once daily. 30 tablet 11    [DISCONTINUED] furosemide (LASIX) 80 MG tablet Take 1 tablet (80 mg total) by mouth 2 (two) times daily. 60 tablet 0    [DISCONTINUED] isosorbide-hydrALAZINE 20-37.5 mg (BIDIL) 20-37.5 mg Tab Take 1 tablet by mouth 2 (two) times daily. 60 tablet 0    [DISCONTINUED] metoprolol tartrate (LOPRESSOR) 50 MG tablet Take 1 tablet (50 mg total) by mouth 2 (two) times daily. 60 tablet 11    [DISCONTINUED]  mirtazapine (REMERON) 30 MG tablet Take 30 mg by mouth every evening.      [DISCONTINUED] omeprazole (PRILOSEC) 40 MG capsule Take 1 capsule (40 mg total) by mouth once daily. 30 capsule 11    [DISCONTINUED] pioglitazone (ACTOS) 30 MG tablet Take 30 mg by mouth once daily.      [DISCONTINUED] rivastigmine (EXELON) 4.6 mg/24 hour PT24 Place 1 patch onto the skin once daily. 30 patch 11    [DISCONTINUED] SITagliptin (JANUVIA) 100 MG Tab Take 100 mg by mouth once daily.      [DISCONTINUED] traZODone (DESYREL) 50 MG tablet Take 50 mg by mouth every evening.         Scheduled meds:   arformoteroL  15 mcg Nebulization BID    aspirin  81 mg Oral Daily    atorvastatin  40 mg Oral Daily    budesonide  0.5 mg Nebulization Q12H    chlorhexidine  15 mL Mouth/Throat BID    docusate sodium  100 mg Oral Daily    doxazosin  2 mg Oral Daily    fluvoxaMINE  25 mg Oral QHS    insulin detemir U-100  5 Units Subcutaneous QHS    ipratropium  0.5 mg Nebulization Q6H WAKE    levothyroxine  50 mcg Oral Before breakfast    metoprolol succinate  25 mg Oral Daily    mupirocin   Nasal BID    NIFEdipine  90 mg Oral Daily    pantoprazole  40 mg Oral Before breakfast    polyethylene glycol  17 g Oral Daily    QUEtiapine  50 mg Oral BID    tamsulosin  0.4 mg Oral Daily       Infusions:      PRN meds:      Review of Systems:  Neg    OBJECTIVE:     Vital Signs and IO:  Temp:  [97.9 °F (36.6 °C)-98.9 °F (37.2 °C)]   Pulse:  [56-72]   Resp:  [16-26]   BP: (117-141)/(57-75)   SpO2:  [95 %-99 %]   I/O last 3 completed shifts:  In: 340 [P.O.:340]  Out: 1300 [Urine:1300]  Wt Readings from Last 5 Encounters:   01/17/23 84.1 kg (185 lb 8 oz)   01/16/23 90.7 kg (200 lb)   01/10/23 89.4 kg (197 lb)   09/14/22 89.5 kg (197 lb 4.8 oz)   08/25/22 91.2 kg (201 lb)     Body mass index is 27.39 kg/m².    Physical Exam  Constitutional:       General: He is not in acute distress.     Appearance: He is well-developed. She is not diaphoretic.   HENT:      Head:  Normocephalic and atraumatic.      Mouth/Throat:      Mouth: Mucous membranes are moist.   Eyes:      General: No scleral icterus.     Pupils: Pupils are equal, round, and reactive to light.   Cardiovascular:      Rate and Rhythm: Normal rate and regular rhythm.   Pulmonary:      Effort: Pulmonary effort is normal. No respiratory distress.      Breath sounds: No stridor.   Abdominal:      General: There is no distension.      Palpations: Abdomen is soft.   Musculoskeletal:         General: No deformity. Normal range of motion.      Cervical back: Neck supple.   Skin:     General: Skin is warm and dry.      Findings: No rash present. No erythema.   Neurological:      Normal  Psychiatric:         Normal    Laboratory:  Recent Labs   Lab 01/20/23  0508 01/21/23  0434 01/22/23  0440   * 138 139   K 4.4 4.3 4.1    107 110   CO2 26 24 23   BUN 55* 51* 42*   CREATININE 2.4* 2.2* 2.0*   * 129* 99         Recent Labs   Lab 01/16/23  1250 01/16/23  1542 01/16/23  1832 01/18/23  0421 01/19/23  0449 01/20/23  0508 01/21/23  0434 01/22/23  0440   CALCIUM 8.4*  --    < > 8.6*   < > 8.6* 8.7 8.7   ALBUMIN 3.2*  --    < > 2.8*   < > 2.8* 2.8* 2.8*   PHOS 4.6*  --   --   --   --   --   --   --    MG  --  2.6  --  2.5  --   --   --   --     < > = values in this interval not displayed.         Recent Labs   Lab 07/10/21  1030 07/11/21  0625   PTH, Intact 92.2 H 129.7 H         No results for input(s): POCTGLUCOSE in the last 168 hours.    Recent Labs   Lab 07/26/22  0631 09/14/22  1526 01/16/23  1807   Hemoglobin A1C 6.1 H 6.1 H 6.6 H         Recent Labs   Lab 01/16/23  1015 01/16/23  1807   WBC 8.03 7.16   HGB 10.4* 9.7*   HCT 34.0* 31.9*    127*   MCV 79* 78*   MCHC 30.6* 30.4*   MONO 9.7  0.8 9.8  0.7         Recent Labs   Lab 01/20/23  0508 01/21/23  0434 01/22/23  0440   BILITOT 0.5 0.4 0.2   PROT 6.2 6.2 6.1   ALBUMIN 2.8* 2.8* 2.8*   ALKPHOS 61 58 57   ALT 32 31 31   AST 36 32 33         Recent Labs    Lab 07/25/22  1726 09/14/22  1539 01/10/23  1035 01/16/23  1401   Color, UA Yellow Yellow Yellow Yellow   Appearance, UA Clear Cloudy A Clear Clear   pH, UA 6.0 5.0 6.0 6.0   Specific [place], UA 1.020 1.020 >=1.030 A 1.010   Protein, UA 3+ A 2+ A 3+ A 1+ A   Glucose, UA 1+ A Negative Negative Negative   Ketones, UA Negative Negative Negative Negative   Urobilinogen, UA Negative 1.0  --  Negative   Bilirubin (UA) Negative Negative 1+ A Negative   Occult Blood UA Trace A Negative Trace A Negative   Nitrite, UA Negative Negative Negative Negative   RBC, UA 0 4 1 1   WBC, UA 4 3 6 H 14 H   Bacteria Rare Negative None Negative   Hyaline Casts, UA 13 A 3.5 A 1 5 A         Recent Labs   Lab 04/06/22  0953 09/14/22  1550 01/16/23  1047   POC PH 7.365 7.409 7.323 L   POC PCO2 39.9 25.8 L 38.9   POC HCO3 22.3 L 18.5 20.2 L   POC PO2 60.9 L 58.8 LL 60 L   POC SATURATED O2  --  93.9 89 L   POC BE  --   --  -6   Sample  --   --  ARTERIAL         Microbiology Results (last 7 days)       ** No results found for the last 168 hours. **          Echo:  The left ventricle is normal in size with moderate concentric hypertrophy and normal systolic function.  The estimated ejection fraction is 60%.  Indeterminate left ventricular diastolic function.  Normal right ventricular size with normal right ventricular systolic function.  Mild left atrial enlargement.  There is severe aortic valve stenosis.  Aortic valve area is 0.98 cm2; peak velocity is 4.2 m/s; mean gradient is 45 mmHg.  Mild tricuspid regurgitation.  Normal central venous pressure (3 mmHg).  The estimated PA systolic pressure is 16 mmHg.  Mildly elevated mean gradient across mitral valve with normal mitral valve area by pressure half time.     ASSESSMENT/PLAN:     BREA  CKD stage 3, baseline sCr around 2  BPH  - renal function improving   - nonoliguric  - continue flomax  - no nsaids or IV contrast  - dose meds for CrCl < 30    Severe AS  Valvular CHF  - trop improved with  diuresis  - can do lasix PRN    SHPT  Anemia of CKD  - stable    Thank you for allowing us to participate in the care of your patient!   We will follow the patient and provide recommendations as needed.    Patient care time was spent personally by me on the following activities: > 35 min    Obtaining a history.  Examination of patient.  Providing medical care at the patients bedside.  Developing a treatment plan with patient or surrogate and bedside caregivers.  Ordering and reviewing laboratory studies, radiographic studies, pulse oximetry.  Ordering and performing treatments and interventions.  Evaluation of patient's response to treatment.  Discussions with consultants while on the unit and immediately available to the patient.  Re-evaluation of the patient's condition.  Documentation in the medical record.     Rosa Diez NP      [facility]  [address] [place] [id number]    [phone] - tel  [phone] - fax    1/22/2023

## 2023-01-23 LAB
ALBUMIN SERPL BCP-MCNC: 3 G/DL (ref 3.5–5.2)
ALP SERPL-CCNC: 63 U/L (ref 55–135)
ALT SERPL W/O P-5'-P-CCNC: 28 U/L (ref 10–44)
ANION GAP SERPL CALC-SCNC: 6 MMOL/L (ref 8–16)
AST SERPL-CCNC: 27 U/L (ref 10–40)
BILIRUB SERPL-MCNC: 0.5 MG/DL (ref 0.1–1)
BUN SERPL-MCNC: 46 MG/DL (ref 8–23)
CALCIUM SERPL-MCNC: 8.7 MG/DL (ref 8.7–10.5)
CHLORIDE SERPL-SCNC: 109 MMOL/L (ref 95–110)
CO2 SERPL-SCNC: 22 MMOL/L (ref 23–29)
CREAT SERPL-MCNC: 1.9 MG/DL (ref 0.5–1.4)
EST. GFR  (NO RACE VARIABLE): 39.4 ML/MIN/1.73 M^2
GLUCOSE SERPL-MCNC: 108 MG/DL (ref 70–110)
GLUCOSE SERPL-MCNC: 121 MG/DL (ref 70–110)
GLUCOSE SERPL-MCNC: 132 MG/DL (ref 70–110)
GLUCOSE SERPL-MCNC: 135 MG/DL (ref 70–110)
POTASSIUM SERPL-SCNC: 4.8 MMOL/L (ref 3.5–5.1)
PROT SERPL-MCNC: 6.2 G/DL (ref 6–8.4)
SODIUM SERPL-SCNC: 137 MMOL/L (ref 136–145)

## 2023-01-23 PROCEDURE — 12000002 HC ACUTE/MED SURGE SEMI-PRIVATE ROOM

## 2023-01-23 PROCEDURE — 25000003 PHARM REV CODE 250: Performed by: STUDENT IN AN ORGANIZED HEALTH CARE EDUCATION/TRAINING PROGRAM

## 2023-01-23 PROCEDURE — 99900031 HC PATIENT EDUCATION (STAT)

## 2023-01-23 PROCEDURE — 97535 SELF CARE MNGMENT TRAINING: CPT

## 2023-01-23 PROCEDURE — 25000003 PHARM REV CODE 250

## 2023-01-23 PROCEDURE — 25000003 PHARM REV CODE 250: Performed by: NURSE PRACTITIONER

## 2023-01-23 PROCEDURE — 99900035 HC TECH TIME PER 15 MIN (STAT)

## 2023-01-23 PROCEDURE — 36415 COLL VENOUS BLD VENIPUNCTURE: CPT | Performed by: NURSE PRACTITIONER

## 2023-01-23 PROCEDURE — 94761 N-INVAS EAR/PLS OXIMETRY MLT: CPT

## 2023-01-23 PROCEDURE — 97116 GAIT TRAINING THERAPY: CPT

## 2023-01-23 PROCEDURE — 80053 COMPREHEN METABOLIC PANEL: CPT | Performed by: NURSE PRACTITIONER

## 2023-01-23 RX ADMIN — METOPROLOL SUCCINATE 25 MG: 25 TABLET, EXTENDED RELEASE ORAL at 09:01

## 2023-01-23 RX ADMIN — ASPIRIN 81 MG CHEWABLE TABLET 81 MG: 81 TABLET CHEWABLE at 09:01

## 2023-01-23 RX ADMIN — DOCUSATE SODIUM 100 MG: 100 CAPSULE, LIQUID FILLED ORAL at 09:01

## 2023-01-23 RX ADMIN — QUETIAPINE 50 MG: 25 TABLET ORAL at 09:01

## 2023-01-23 RX ADMIN — ATORVASTATIN CALCIUM 40 MG: 40 TABLET, FILM COATED ORAL at 09:01

## 2023-01-23 RX ADMIN — CHLORHEXIDINE GLUCONATE 15 ML: 1.2 RINSE ORAL at 09:01

## 2023-01-23 RX ADMIN — INSULIN DETEMIR 5 UNITS: 100 INJECTION, SOLUTION SUBCUTANEOUS at 09:01

## 2023-01-23 RX ADMIN — MUPIROCIN 1 G: 20 OINTMENT TOPICAL at 09:01

## 2023-01-23 RX ADMIN — NIFEDIPINE 90 MG: 30 TABLET, FILM COATED, EXTENDED RELEASE ORAL at 09:01

## 2023-01-23 RX ADMIN — LEVOTHYROXINE SODIUM 50 MCG: 0.03 TABLET ORAL at 05:01

## 2023-01-23 RX ADMIN — CLONAZEPAM 0.5 MG: 0.5 TABLET ORAL at 09:01

## 2023-01-23 RX ADMIN — PANTOPRAZOLE SODIUM 40 MG: 40 TABLET, DELAYED RELEASE ORAL at 05:01

## 2023-01-23 RX ADMIN — TAMSULOSIN HYDROCHLORIDE 0.4 MG: 0.4 CAPSULE ORAL at 09:01

## 2023-01-23 RX ADMIN — DOXAZOSIN 2 MG: 1 TABLET ORAL at 09:01

## 2023-01-23 NOTE — CARE UPDATE
01/23/23 0729   Patient Assessment/Suction   Level of Consciousness (AVPU) alert   Respiratory Effort Normal;Unlabored   Expansion/Accessory Muscles/Retractions no use of accessory muscles;no retractions;expansion symmetric   All Lung Fields Breath Sounds clear   Rhythm/Pattern, Respiratory unlabored;pattern regular;depth regular;chest wiggle adequate;no shortness of breath reported   Cough Frequency no cough   PRE-TX-O2   Device (Oxygen Therapy) room air   SpO2 97 %   Pulse Oximetry Type Intermittent   $ Pulse Oximetry - Multiple Charge Pulse Oximetry - Multiple   Pulse 65   Resp 18   Aerosol Therapy   $ Aerosol Therapy Charges PRN treatment not required   Education   $ Education Bronchodilator;15 min   Respiratory Evaluation   $ Care Plan Tech Time 15 min
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
## 2023-01-23 NOTE — PROGRESS NOTES
[facility] Medicine  Progress Note    Patient name: Pam Gray  MRN: [id number]  Admit Date: 1/16/2023   LOS: 4 days     SUBJECTIVE:     Principal problem: Acute on chronic combined systolic and diastolic CHF, NYHA class 1    Interval History:      1/18- more talkative today.  Sitter in room.  Plan to return to [place] pending further cardiology w/u for AS and poss isch heart disease    1/17- Patient is currently roomed in hallway, denies chest pain.  Shortness of breath has improved.  Was at [place] without oxygen, baseline req is 2LNC.  Difficult historian.  Follow up cards recs.     1/19  patient seen examined today.  Denies any shortness of breath, chest pain, dizziness, lightheadedness.  He says he requires oxygen at home but was not taking it at behavioral center.     1/20:  No overnight events reported.  Patient does the requirement for home oxygen.    1/21 no overnight events reported.  Doing well today.    1/22 no overnight events reported.  Psychiatry consult today for clearance.     1/22 Off oxygen today.  Saturating well on room air.  Comfortable.  No complaints.  Scheduled Meds:   aspirin  81 mg Oral Daily    atorvastatin  40 mg Oral Daily    chlorhexidine  15 mL Mouth/Throat BID    docusate sodium  100 mg Oral Daily    doxazosin  2 mg Oral Daily    fluvoxaMINE  25 mg Oral QHS    insulin detemir U-100  5 Units Subcutaneous QHS    levothyroxine  50 mcg Oral Before breakfast    metoprolol succinate  25 mg Oral Daily    mupirocin   Nasal BID    NIFEdipine  90 mg Oral Daily    pantoprazole  40 mg Oral Before breakfast    polyethylene glycol  17 g Oral Daily    QUEtiapine  50 mg Oral BID    tamsulosin  0.4 mg Oral Daily     Continuous Infusions:  PRN Meds:acetaminophen, arformoteroL, budesonide, clonazePAM, dextrose 10%, dextrose 10%, glucagon (human recombinant), glucose, glucose, insulin aspart U-100, ipratropium, naloxone, sodium chloride 0.9%    Review of patient's  allergies indicates:   Allergen Reactions    Onion Other (See Comments)     THROAT SWELLS    Pork/porcine containing products      Catholic PREFERENCE    Shellfish containing products      Catholic PREFERENCE      Shrimp      Catholic PREFERENCE         Review of Systems: As per interval history    OBJECTIVE:     Vital Signs (Most Recent)  Temp: 97.9 °F (36.6 °C) (01/23/23 1100)  Pulse: (!) 58 (01/23/23 1100)  Resp: 18 (01/23/23 1100)  BP: (!) 127/58 (01/23/23 1100)  SpO2: 99 % (01/23/23 1100)    Vital Signs Range (Last 24H):  Temp:  [97.3 °F (36.3 °C)-98.2 °F (36.8 °C)]   Pulse:  [58-69]   Resp:  [18]   BP: (124-164)/(58-89)   SpO2:  [95 %-100 %]     I & O (Last 24H):  Intake/Output Summary (Last 24 hours) at 1/23/2023 1506  Last data filed at 1/23/2023 1047  Gross per 24 hour   Intake 560 ml   Output 1525 ml   Net -965 ml       Physical Exam:  General: Patient resting eyes closed, arouses easily  Eyes: No conjunctival injection. No scleral icterus.  ENT: Hearing grossly intact. No discharge from ears. No nasal discharge.   CVS: RRR.  Lungs:  No tachypnea or accessory muscle use.  Clear to auscultation bilaterally  Abdomen:  Soft, nontender and nondistended.  No organomegaly  Neuro: Alert, oriented x3.  Speech is slow. Moves all extremities. Follows commands.       Laboratory:  I have reviewed all pertinent lab results within the past 24 hours.    Diagnostic Results:  Labs: Reviewed  ECG: Reviewed  X-Ray: Reviewed  Echo: Reviewed    ASSESSMENT/PLAN:     62-year-old male patient with multiple comorbidities including severe aortic stenosis, COPD on chronic O2, CAD, CHF who presented from behavioral psych facility complaining of shortness of breath.  Patient is supposed to be on chronic oxygen but was not on oxygen at the time as he has been noncompliant.  Patient is currently on psychiatric hold from the quarter.  He is medically stable for discharge however he is unable to go to psych facility with oxygen.   Psychiatry consult placed today for clearance.      Shortness of breath  COPD  HFpEF  Severe aortic stenosis  Restart home controllers for copd- pt refusing  Duonebs prn  Off IV diuresis.  Cardio recommends 20 mg daily p.r.n. for outpatient diuresis.  Echo w/EF 60%, suspect some degree of diastolic dysfx as well as severe AS  Cardiology consulted  Considering possible SAVR vs TAVR, but would be fairly high risk   Patient appears to be at his baseline clinical status.  He is supposed to be on oxygen chronically which he was not on at home  Disposition currently complicated by the fact that behavioral will not take him with oxygen.  Will get a psych consult and see if patient can possibly go home.      BREA/CKD  Hyperkalemia   - resolved    Dispo   Medically cleared however looks like behavioral will  not take him with oxygen.    Active Hospital Problems    Diagnosis  POA    *Acute on chronic combined systolic and diastolic CHF, NYHA class 1 [I50.43]  Yes    Hyponatremia [E87.1]  Yes    Acute respiratory failure with hypoxia [J96.01]  Yes    Type 2 diabetes mellitus with neurologic complication, with long-term current use of insulin [E11.49, Z79.4]  Not Applicable    Steroid-induced hyperglycemia [R73.9, T38.0X5A]  Yes    MDD (major depressive disorder), recurrent episode, moderate [F33.1]  Yes    Microcytic anemia [D50.9]  Yes    Vascular dementia with delirium [F01.50, F05]  Yes    Methamphetamine / cocaine dependence [F15.20]  Yes    Hypothyroid [E03.9]  Yes    CAD (coronary artery disease) [I25.10]  Yes    Hyperkalemia [E87.5]  Yes    Stage 3 severe COPD by GOLD classification [J44.9]  Yes    Hypertension [I10]  Yes    AYDEE on CPAP [G47.33, Z99.89]  Not Applicable      Resolved Hospital Problems   No resolved problems to display.               VTE Risk Mitigation (From admission, onward)           Ordered     Reason for No Pharmacological VTE Prophylaxis  Once        Question:  Reasons:  Answer:  Physician Provided  (leave comment)    01/16/23 1353     Place RADHA hose  Until discontinued         01/16/23 1353     IP VTE HIGH RISK PATIENT  Once         01/16/23 1353                        Department Hospital Medicine  [facility]  Chun Booker MD

## 2023-01-23 NOTE — PT/OT/SLP PROGRESS
Occupational Therapy   Treatment    Name: Pam Gray  MRN: [id number]  Admitting Diagnosis:  Acute on chronic combined systolic and diastolic CHF, NYHA class 1       Recommendations:     Discharge Recommendations: home health OT  Discharge Equipment Recommendations:  none  Barriers to discharge:  Decreased caregiver support    Assessment:     Pam Gray is a 62 y.o. male with a medical diagnosis of Acute on chronic combined systolic and diastolic CHF, NYHA class 1.  He presents with improving medical acuity and functional mobility. Participated in LB dressing sitting EOB, functional transfer, grooming standing at sink and ambulation in the hospital room using rolling walker. Performance deficits affecting function are weakness, impaired endurance, impaired self care skills, impaired functional mobility, gait instability, impaired balance, decreased lower extremity function, decreased safety awareness, impaired cardiopulmonary response to activity.     Rehab Prognosis:  Fair; patient would benefit from acute skilled OT services to address these deficits and reach maximum level of function.       Plan:     Patient to be seen 5 x/week to address the above listed problems via self-care/home management, therapeutic activities, therapeutic exercises  Plan of Care Expires: 02/09/23  Plan of Care Reviewed with: patient    Subjective     Pain/Comfort:  Pain Rating 1: 0/10  Pain Rating Post-Intervention 1: 0/10    Objective:     Communicated with: nurse prior to session.  Patient found HOB elevated with telemetry, peripheral IV upon OT entry to room.    General Precautions: Standard, fall    Orthopedic Precautions:N/A  Braces: N/A  Respiratory Status: Room air     Occupational Performance:     Bed Mobility:    Patient completed Scooting/Bridging with contact guard assistance  Patient completed Supine to Sit with contact guard assistance  Patient completed Sit to Supine with contact guard assistance      Functional Mobility/Transfers:  Patient completed Sit <> Stand Transfer with contact guard assistance  with  rolling walker   Functional Mobility: ambulated 25 feet in the hospital room with contact guard assistance using rolling walker.    Activities of Daily Living:  Grooming: contact guard assistance to wash hands standing at sink  Lower Body Dressing: contact guard assistance to don/doff socks sitting EOB.      [facility] 6 Click ADL:      Treatment & Education:  Patient pleasant throughout session and participated in sitting/standing ADL activity.     Patient left up in chair with all lines intact, call button in reach, and sitter present    GOALS:   Multidisciplinary Problems       Occupational Therapy Goals          Problem: Occupational Therapy    Goal Priority Disciplines Outcome Interventions   Occupational Therapy Goal     OT, PT/OT Ongoing, Progressing    Description: Goals to be met by: 2/9/2023     Patient will increase functional independence with ADLs by performing:    LE Dressing with Modified [place].  Grooming while standing at sink with Modified [place].  Toileting from toilet with Modified [place] for hygiene and clothing management.   Supine to sit with Modified [place].  Toilet transfer to toilet with Modified [place].                         Time Tracking:     OT Date of Treatment: 01/23/23  OT Start Time: 1004  OT Stop Time: 1018  OT Total Time (min): 14 min    Billable Minutes:Self Care/Home Management 14    OT/JEN: OT          1/23/2023

## 2023-01-23 NOTE — PT/OT/SLP PROGRESS
Physical Therapy Treatment    Patient Name:  Pam Gray   MRN:  [id number]    Recommendations:     Discharge Recommendations: home health PT  Discharge Equipment Recommendations: none  Barriers to discharge: Decreased caregiver support    Assessment:     Pam Gray is a 62 y.o. male admitted with a medical diagnosis of Acute on chronic combined systolic and diastolic CHF, NYHA class 1.  He presents with the following impairments/functional limitations: weakness, impaired endurance, impaired sensation, impaired self care skills, impaired functional mobility, gait instability, impaired balance, impaired cognition, decreased safety awareness, impaired cardiopulmonary response to activity.    Pt agreeable to PT this day.  Gait training 2x75' without supplemental O2 and able to maintain SaO2 98% or greater.  Pt demonstrates improved endurance and activity tolerance, progressing well.    Rehab Prognosis: Fair; patient would benefit from acute skilled PT services to address these deficits and reach maximum level of function.    Recent Surgery: * No surgery found *      Plan:     During this hospitalization, patient to be seen 5 x/week to address the identified rehab impairments via gait training, therapeutic activities, therapeutic exercises and progress toward the following goals:    Plan of Care Expires:       Subjective     Chief Complaint: tired, cant feel my feet or fingers  Patient/Family Comments/goals: get better  Pain/Comfort:  Pain Rating Post-Intervention 1: 0/10      Objective:     Communicated with RN prior to session.  Patient found right sidelying with telemetry, pulse ox (continuous), blood pressure cuff (sitter) upon PT entry to room.     General Precautions: Standard, fall  Orthopedic Precautions: N/A  Braces: N/A  Respiratory Status: Room air     Functional Mobility:  Bed Mobility:     Supine to Sit: supervision  Sit to Supine: supervision  Transfers:     Sit to Stand:  contact guard  assistance with rolling walker  Gait: 2x75' w/RW and CGA      AM-PAC 6 CLICK MOBILITY          Treatment & Education:  Pt was educated on the following: call light use, importance of OOB activity and functional mobility to negate the negative effects of prolonged bed rest during this hospitalization, safe transfers/ambulation and discharge planning recommendations/options.     Patient left left sidelying with all lines intact, call button in reach, bed alarm on, Rn notified, and sitter present..    GOALS:   Multidisciplinary Problems       Physical Therapy Goals          Problem: Physical Therapy    Goal Priority Disciplines Outcome Goal Variances Interventions   Physical Therapy Goal     PT, PT/OT      Description: Goals to be met by:      Patient will increase functional independence with mobility by performin. Sit to stand transfer with Stand-by Assistance  2. Bed to chair transfer with Contact Guard Assistance using Rolling Walker  3. Gait  x 50 feet with Minimal Assistance using Rolling Walker.                          Time Tracking:     PT Received On: 23  PT Start Time: 944     PT Stop Time: 57  PT Total Time (min): 13 min     Billable Minutes: Gait Training 13    Treatment Type: Treatment  PT/PTA: PT           2023
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
## 2023-01-23 NOTE — PLAN OF CARE
Spoke with Stephanie at [facility] concerning the option to return patient to their facility.  Per Stephanie, the patient's oxygen requirements are not an issue and they are able to provide oxygen therapy at the [place] location; however, due to his CEC expiring tomorrow, they will need a psych eval to verify the patient's behaviors are appropriate for psych admission.  Dr. Oliver notified and consult to Psychiatry is pending.        01/23/23 2874   Discharge Reassessment   Assessment Type Discharge Planning Reassessment   Did the patient's condition or plan change since previous assessment? No   Discharge Plan A Psychiatric hospital   Discharge Plan B Home   DME Needed Upon Discharge  none   Discharge Barriers Identified None   Why the patient remains in the hospital Requires continued medical care;Social issues;Placement issues   Post-Acute Status   Post-Acute Authorization Placement   Post-Acute Placement Status Pending medical clearance/testing   Discharge Delays None known at this time

## 2023-01-23 NOTE — PLAN OF CARE
Problem: Violence Risk or Actual  Goal: Anger and Impulse Control  Outcome: Ongoing, Progressing     Problem: Adult Inpatient Plan of Care  Goal: Plan of Care Review  Outcome: Ongoing, Progressing  Goal: Absence of Hospital-Acquired Illness or Injury  Outcome: Ongoing, Progressing  Goal: Optimal Comfort and Wellbeing  Outcome: Ongoing, Progressing  Goal: Readiness for Transition of Care  Outcome: Ongoing, Progressing     Problem: Diabetes Comorbidity  Goal: Blood Glucose Level Within Targeted Range  Outcome: Ongoing, Progressing     Problem: Fluid Imbalance (Pneumonia)  Goal: Fluid Balance  Outcome: Ongoing, Progressing     Problem: Infection (Pneumonia)  Goal: Resolution of Infection Signs and Symptoms  Outcome: Ongoing, Progressing     Problem: Respiratory Compromise (Pneumonia)  Goal: Effective Oxygenation and Ventilation  Outcome: Ongoing, Progressing     Problem: Skin Injury Risk Increased  Goal: Skin Health and Integrity  Outcome: Ongoing, Progressing     Problem: Fall Injury Risk  Goal: Absence of Fall and Fall-Related Injury  Outcome: Ongoing, Progressing     Problem: Suicidal Behavior  Goal: Suicidal Behavior is Absent or Managed  Outcome: Ongoing, Progressing     Problem: Oral Intake Inadequate  Goal: Improved Oral Intake  Outcome: Ongoing, Progressing

## 2023-01-23 NOTE — PROGRESS NOTES
Nephrology Progress Note        Patient Name: Pam Gray  MRN: [id number]    Patient Class: IP- Inpatient   Admission Date: 1/16/2023  Length of Stay: 4 days  Date of Service: 1/23/2023    Attending Physician: Chun Oliver MD  Primary Care Provider: Jacob Zuniga MD    Reason for Consult: sob/brea/hyperkalemia/hyponatremia/acidosis/chf/anemia/thn    SUBJECTIVE:     HPI: 62M with past medical history of anxiety, arthritis, CHF, CKD stage 3, COPD, CAD, diabetes mellitus, peripheral neuropathy, CVA presented initially on 1/10 to the emergency department at [facility] in [place], [place] via ambulance with complaints of SI and depression. He was sent PECed to [facility] and now brought to [facility] with SOB. Labs show BREA, hyperkalemia. ABG with slight acidosis.    1/17  VSS, K+ at goal.  1.7L UOP recorded.  No complaints.  1/18 VSS. Appreciate cards recommendations and agree with plan.  1/19 genie, BP stable, on 3L NC, UOP 1.8L  1/20 VSS, on 1L NC, UOP 1.4L, no complaints  1/21 no events overnight  1/22  mildly bradycardic, pressures stable, renal function improving.  UOP adequate.  Sleeping, NAD noted.  Awaiting psych placement.  1/23  AFVSS.  No distress.  Uop not recorded completely     Outpatient meds:  No current facility-administered medications on file prior to encounter.     Current Outpatient Medications on File Prior to Encounter   Medication Sig Dispense Refill    albuterol-ipratropium (DUO-NEB) 2.5 mg-0.5 mg/3 mL nebulizer solution Take 3 mLs by nebulization every 6 (six) hours while awake. Rescue 270 mL 0    aspirin 81 MG Chew Take 1 tablet (81 mg total) by mouth once daily. 30 tablet 0    atorvastatin (LIPITOR) 40 MG tablet Take 1 tablet (40 mg total) by mouth once daily. 30 tablet 0    clonazePAM (KLONOPIN) 0.5 MG tablet Take 0.5 mg by mouth 2 (two) times daily as needed for Anxiety.      doxazosin (CARDURA) 2 MG tablet Take 1 tablet (2 mg total) by mouth once daily. 30 tablet 0     fluticasone furoate-vilanteroL (BREO) 100-25 mcg/dose diskus inhaler Inhale 1 puff into the lungs once daily. Controller 30 each 0    fluvoxaMINE (LUVOX) 25 MG tablet Take 1 tablet (25 mg total) by mouth every evening. 90 tablet 1    gabapentin (NEURONTIN) 300 MG capsule Take 1 capsule by mouth once daily.      insulin aspart U-100 (NOVOLOG) 100 unit/mL (3 mL) InPn pen Inject 1-10 Units into the skin before meals and at bedtime as needed (Hyperglycemia).  0    insulin detemir U-100 (LEVEMIR FLEXTOUCH U-100 INSULN) 100 unit/mL (3 mL) InPn pen Inject 10 Units into the skin every evening.  0    levothyroxine (SYNTHROID) 50 MCG tablet Take 1 tablet (50 mcg total) by mouth once daily.      metoprolol succinate (TOPROL-XL) 25 MG 24 hr tablet Take 1 tablet (25 mg total) by mouth once daily. 30 tablet 0    NIFEdipine (PROCARDIA-XL) 90 MG (OSM) 24 hr tablet Take 1 tablet (90 mg total) by mouth once daily. 30 tablet 0    pantoprazole (PROTONIX) 40 MG tablet Take 1 tablet (40 mg total) by mouth once daily. 30 tablet 0    QUEtiapine (SEROQUEL) 50 MG tablet Take 1 tablet (50 mg total) by mouth 2 (two) times daily. 60 tablet 11    tamsulosin (FLOMAX) 0.4 mg Cap Take 1 capsule (0.4 mg total) by mouth once daily.      tiotropium bromide (SPIRIVA RESPIMAT) 2.5 mcg/actuation inhaler Inhale 2 puffs into the lungs once daily. Controller 4 g 0    [DISCONTINUED] albuterol (PROVENTIL) 2.5 mg /3 mL (0.083 %) nebulizer solution USE 1 VIAL VIA NEBULIZER EVERY 6 HOURS AS NEEDED 7/9/21      [DISCONTINUED] amLODIPine (NORVASC) 10 MG tablet Take 1 tablet (10 mg total) by mouth once daily. 30 tablet 11    [DISCONTINUED] furosemide (LASIX) 80 MG tablet Take 1 tablet (80 mg total) by mouth 2 (two) times daily. 60 tablet 0    [DISCONTINUED] isosorbide-hydrALAZINE 20-37.5 mg (BIDIL) 20-37.5 mg Tab Take 1 tablet by mouth 2 (two) times daily. 60 tablet 0    [DISCONTINUED] metoprolol tartrate (LOPRESSOR) 50 MG tablet Take 1 tablet (50 mg total) by mouth  2 (two) times daily. 60 tablet 11    [DISCONTINUED] mirtazapine (REMERON) 30 MG tablet Take 30 mg by mouth every evening.      [DISCONTINUED] omeprazole (PRILOSEC) 40 MG capsule Take 1 capsule (40 mg total) by mouth once daily. 30 capsule 11    [DISCONTINUED] pioglitazone (ACTOS) 30 MG tablet Take 30 mg by mouth once daily.      [DISCONTINUED] rivastigmine (EXELON) 4.6 mg/24 hour PT24 Place 1 patch onto the skin once daily. 30 patch 11    [DISCONTINUED] SITagliptin (JANUVIA) 100 MG Tab Take 100 mg by mouth once daily.      [DISCONTINUED] traZODone (DESYREL) 50 MG tablet Take 50 mg by mouth every evening.         Scheduled meds:   aspirin  81 mg Oral Daily    atorvastatin  40 mg Oral Daily    chlorhexidine  15 mL Mouth/Throat BID    docusate sodium  100 mg Oral Daily    doxazosin  2 mg Oral Daily    fluvoxaMINE  25 mg Oral QHS    insulin detemir U-100  5 Units Subcutaneous QHS    levothyroxine  50 mcg Oral Before breakfast    metoprolol succinate  25 mg Oral Daily    mupirocin   Nasal BID    NIFEdipine  90 mg Oral Daily    pantoprazole  40 mg Oral Before breakfast    polyethylene glycol  17 g Oral Daily    QUEtiapine  50 mg Oral BID    tamsulosin  0.4 mg Oral Daily       Infusions:      PRN meds:      Review of Systems:  Neg    OBJECTIVE:     Vital Signs and IO:  Temp:  [97.3 °F (36.3 °C)-98.2 °F (36.8 °C)]   Pulse:  [58-69]   Resp:  [18]   BP: (124-164)/(58-89)   SpO2:  [95 %-100 %]   I/O last 3 completed shifts:  In: -   Out: 900 [Urine:900]  Wt Readings from Last 5 Encounters:   01/23/23 87 kg (191 lb 12.8 oz)   01/16/23 90.7 kg (200 lb)   01/10/23 89.4 kg (197 lb)   09/14/22 89.5 kg (197 lb 4.8 oz)   08/25/22 91.2 kg (201 lb)     Body mass index is 28.32 kg/m².    Physical Exam  Constitutional:       General: He is not in acute distress.     Appearance: He is well-developed. She is not diaphoretic.   HENT:      Head: Normocephalic and atraumatic.      Mouth/Throat:      Mouth: Mucous membranes are moist.   Eyes:       General: No scleral icterus.     Pupils: Pupils are equal, round, and reactive to light.   Cardiovascular:      Rate and Rhythm: Normal rate and regular rhythm.   Pulmonary:      Effort: Pulmonary effort is normal. No respiratory distress.      Breath sounds: No stridor.   Abdominal:      General: There is no distension.      Palpations: Abdomen is soft.   Musculoskeletal:         General: No deformity. Normal range of motion.      Cervical back: Neck supple.   Skin:     General: Skin is warm and dry.      Findings: No rash present. No erythema.   Neurological:      Normal  Psychiatric:         Normal    Laboratory:  Recent Labs   Lab 01/21/23  0434 01/22/23  0440 01/23/23  0502    139 137   K 4.3 4.1 4.8    110 109   CO2 24 23 22*   BUN 51* 42* 46*   CREATININE 2.2* 2.0* 1.9*   * 99 108         Recent Labs   Lab 01/16/23  1250 01/16/23  1542 01/16/23  1832 01/18/23  0421 01/19/23  0449 01/21/23  0434 01/22/23  0440 01/23/23  0502   CALCIUM 8.4*  --    < > 8.6*   < > 8.7 8.7 8.7   ALBUMIN 3.2*  --    < > 2.8*   < > 2.8* 2.8* 3.0*   PHOS 4.6*  --   --   --   --   --   --   --    MG  --  2.6  --  2.5  --   --   --   --     < > = values in this interval not displayed.         Recent Labs   Lab 07/10/21  1030 07/11/21  0625   PTH, Intact 92.2 H 129.7 H         No results for input(s): POCTGLUCOSE in the last 168 hours.    Recent Labs   Lab 07/26/22  0631 09/14/22  1526 01/16/23  1807   Hemoglobin A1C 6.1 H 6.1 H 6.6 H         Recent Labs   Lab 01/16/23  1807   WBC 7.16   HGB 9.7*   HCT 31.9*   *   MCV 78*   MCHC 30.4*   MONO 9.8  0.7         Recent Labs   Lab 01/21/23  0434 01/22/23  0440 01/23/23  0502   BILITOT 0.4 0.2 0.5   PROT 6.2 6.1 6.2   ALBUMIN 2.8* 2.8* 3.0*   ALKPHOS 58 57 63   ALT 31 31 28   AST 32 33 27         Recent Labs   Lab 07/25/22  1726 09/14/22  1539 01/10/23  1035 01/16/23  1401   Color, UA Yellow Yellow Yellow Yellow   Appearance, UA Clear Cloudy A Clear Clear   pH,  UA 6.0 5.0 6.0 6.0   Specific Dillon, UA 1.020 1.020 >=1.030 A 1.010   Protein, UA 3+ A 2+ A 3+ A 1+ A   Glucose, UA 1+ A Negative Negative Negative   Ketones, UA Negative Negative Negative Negative   Urobilinogen, UA Negative 1.0  --  Negative   Bilirubin (UA) Negative Negative 1+ A Negative   Occult Blood UA Trace A Negative Trace A Negative   Nitrite, UA Negative Negative Negative Negative   RBC, UA 0 4 1 1   WBC, UA 4 3 6 H 14 H   Bacteria Rare Negative None Negative   Hyaline Casts, UA 13 A 3.5 A 1 5 A         Recent Labs   Lab 04/06/22  0953 09/14/22  1550 01/16/23  1047   POC PH 7.365 7.409 7.323 L   POC PCO2 39.9 25.8 L 38.9   POC HCO3 22.3 L 18.5 20.2 L   POC PO2 60.9 L 58.8 LL 60 L   POC SATURATED O2  --  93.9 89 L   POC BE  --   --  -6   Sample  --   --  ARTERIAL         Microbiology Results (last 7 days)       ** No results found for the last 168 hours. **          Echo:  The left ventricle is normal in size with moderate concentric hypertrophy and normal systolic function.  The estimated ejection fraction is 60%.  Indeterminate left ventricular diastolic function.  Normal right ventricular size with normal right ventricular systolic function.  Mild left atrial enlargement.  There is severe aortic valve stenosis.  Aortic valve area is 0.98 cm2; peak velocity is 4.2 m/s; mean gradient is 45 mmHg.  Mild tricuspid regurgitation.  Normal central venous pressure (3 mmHg).  The estimated PA systolic pressure is 16 mmHg.  Mildly elevated mean gradient across mitral valve with normal mitral valve area by pressure half time.     ASSESSMENT/PLAN:     BREA  CKD stage 3, baseline sCr around 2  BPH  - renal function improving   - nonoliguric  - continue flomax  - no nsaids or IV contrast  - dose meds for CrCl < 30    Severe AS  Valvular CHF  - trop improved with diuresis  - can do lasix PRN    SHPT  Anemia of CKD  - stable    Thank you for allowing us to participate in the care of your patient!   We will follow the  patient and provide recommendations as needed.    Patient care time was spent personally by me on the following activities: > 35 min    Obtaining a history.  Examination of patient.  Providing medical care at the patients bedside.  Developing a treatment plan with patient or surrogate and bedside caregivers.  Ordering and reviewing laboratory studies, radiographic studies, pulse oximetry.  Ordering and performing treatments and interventions.  Evaluation of patient's response to treatment.  Discussions with consultants while on the unit and immediately available to the patient.  Re-evaluation of the patient's condition.  Documentation in the medical record.     Nash Germain MD      [facility]  [address] [place] [id number]    [phone] - tel  [phone] - fax    1/23/2023

## 2023-01-24 VITALS
DIASTOLIC BLOOD PRESSURE: 60 MMHG | WEIGHT: 191.81 LBS | HEIGHT: 69 IN | HEART RATE: 59 BPM | SYSTOLIC BLOOD PRESSURE: 127 MMHG | BODY MASS INDEX: 28.41 KG/M2 | RESPIRATION RATE: 18 BRPM | TEMPERATURE: 98 F | OXYGEN SATURATION: 100 %

## 2023-01-24 PROBLEM — I35.0 AORTIC STENOSIS: Status: ACTIVE | Noted: 2023-01-24

## 2023-01-24 LAB
ALBUMIN SERPL BCP-MCNC: 3.1 G/DL (ref 3.5–5.2)
ALP SERPL-CCNC: 63 U/L (ref 55–135)
ALT SERPL W/O P-5'-P-CCNC: 28 U/L (ref 10–44)
ANION GAP SERPL CALC-SCNC: 8 MMOL/L (ref 8–16)
AST SERPL-CCNC: 28 U/L (ref 10–40)
BILIRUB SERPL-MCNC: 0.5 MG/DL (ref 0.1–1)
BNP SERPL-MCNC: 417 PG/ML (ref 0–99)
BUN SERPL-MCNC: 53 MG/DL (ref 8–23)
CALCIUM SERPL-MCNC: 8.8 MG/DL (ref 8.7–10.5)
CHLORIDE SERPL-SCNC: 110 MMOL/L (ref 95–110)
CO2 SERPL-SCNC: 21 MMOL/L (ref 23–29)
CREAT SERPL-MCNC: 1.9 MG/DL (ref 0.5–1.4)
ERYTHROCYTE [DISTWIDTH] IN BLOOD BY AUTOMATED COUNT: 15.9 % (ref 11.5–14.5)
EST. GFR  (NO RACE VARIABLE): 39.4 ML/MIN/1.73 M^2
GLUCOSE SERPL-MCNC: 109 MG/DL (ref 70–110)
GLUCOSE SERPL-MCNC: 160 MG/DL (ref 70–110)
GLUCOSE SERPL-MCNC: 95 MG/DL (ref 70–110)
HCT VFR BLD AUTO: 33.5 % (ref 40–54)
HGB BLD-MCNC: 10.4 G/DL (ref 14–18)
MCH RBC QN AUTO: 23.5 PG (ref 27–31)
MCHC RBC AUTO-ENTMCNC: 31 G/DL (ref 32–36)
MCV RBC AUTO: 76 FL (ref 82–98)
PLATELET # BLD AUTO: 193 K/UL (ref 150–450)
PMV BLD AUTO: 10.2 FL (ref 9.2–12.9)
POTASSIUM SERPL-SCNC: 4.5 MMOL/L (ref 3.5–5.1)
PROT SERPL-MCNC: 6.2 G/DL (ref 6–8.4)
RBC # BLD AUTO: 4.43 M/UL (ref 4.6–6.2)
SODIUM SERPL-SCNC: 139 MMOL/L (ref 136–145)
WBC # BLD AUTO: 4.85 K/UL (ref 3.9–12.7)

## 2023-01-24 PROCEDURE — 25000003 PHARM REV CODE 250: Performed by: STUDENT IN AN ORGANIZED HEALTH CARE EDUCATION/TRAINING PROGRAM

## 2023-01-24 PROCEDURE — 94618 PULMONARY STRESS TESTING: CPT

## 2023-01-24 PROCEDURE — 80053 COMPREHEN METABOLIC PANEL: CPT | Performed by: NURSE PRACTITIONER

## 2023-01-24 PROCEDURE — 36415 COLL VENOUS BLD VENIPUNCTURE: CPT | Performed by: NURSE PRACTITIONER

## 2023-01-24 PROCEDURE — 99900035 HC TECH TIME PER 15 MIN (STAT)

## 2023-01-24 PROCEDURE — 99900031 HC PATIENT EDUCATION (STAT)

## 2023-01-24 PROCEDURE — 83880 ASSAY OF NATRIURETIC PEPTIDE: CPT | Performed by: INTERNAL MEDICINE

## 2023-01-24 PROCEDURE — 97535 SELF CARE MNGMENT TRAINING: CPT

## 2023-01-24 PROCEDURE — 25000003 PHARM REV CODE 250: Performed by: NURSE PRACTITIONER

## 2023-01-24 PROCEDURE — 85027 COMPLETE CBC AUTOMATED: CPT | Performed by: INTERNAL MEDICINE

## 2023-01-24 PROCEDURE — 36415 COLL VENOUS BLD VENIPUNCTURE: CPT | Performed by: INTERNAL MEDICINE

## 2023-01-24 PROCEDURE — 97116 GAIT TRAINING THERAPY: CPT | Mod: CQ

## 2023-01-24 PROCEDURE — 94761 N-INVAS EAR/PLS OXIMETRY MLT: CPT

## 2023-01-24 RX ORDER — QUETIAPINE FUMARATE 100 MG/1
100 TABLET, FILM COATED ORAL NIGHTLY
Status: DISCONTINUED | OUTPATIENT
Start: 2023-01-24 | End: 2023-01-24 | Stop reason: HOSPADM

## 2023-01-24 RX ORDER — FLUVOXAMINE MALEATE 50 MG/1
50 TABLET ORAL NIGHTLY
Qty: 30 TABLET | Refills: 0 | Status: SHIPPED | OUTPATIENT
Start: 2023-01-24 | End: 2023-07-25 | Stop reason: SDUPTHER

## 2023-01-24 RX ORDER — FLUVOXAMINE MALEATE 50 MG/1
50 TABLET ORAL NIGHTLY
Status: DISCONTINUED | OUTPATIENT
Start: 2023-01-24 | End: 2023-01-24 | Stop reason: HOSPADM

## 2023-01-24 RX ORDER — QUETIAPINE FUMARATE 100 MG/1
100 TABLET, FILM COATED ORAL NIGHTLY
Qty: 30 TABLET | Refills: 0 | Status: SHIPPED | OUTPATIENT
Start: 2023-01-24 | End: 2023-02-23

## 2023-01-24 RX ORDER — FLUVOXAMINE MALEATE 50 MG/1
50 TABLET ORAL 2 TIMES DAILY
Status: DISCONTINUED | OUTPATIENT
Start: 2023-01-27 | End: 2023-01-24 | Stop reason: HOSPADM

## 2023-01-24 RX ADMIN — NIFEDIPINE 90 MG: 30 TABLET, FILM COATED, EXTENDED RELEASE ORAL at 08:01

## 2023-01-24 RX ADMIN — ASPIRIN 81 MG CHEWABLE TABLET 81 MG: 81 TABLET CHEWABLE at 08:01

## 2023-01-24 RX ADMIN — ATORVASTATIN CALCIUM 40 MG: 40 TABLET, FILM COATED ORAL at 08:01

## 2023-01-24 RX ADMIN — METOPROLOL SUCCINATE 25 MG: 25 TABLET, EXTENDED RELEASE ORAL at 08:01

## 2023-01-24 RX ADMIN — DOCUSATE SODIUM 100 MG: 100 CAPSULE, LIQUID FILLED ORAL at 08:01

## 2023-01-24 RX ADMIN — PANTOPRAZOLE SODIUM 40 MG: 40 TABLET, DELAYED RELEASE ORAL at 04:01

## 2023-01-24 RX ADMIN — DOXAZOSIN 2 MG: 1 TABLET ORAL at 08:01

## 2023-01-24 RX ADMIN — LEVOTHYROXINE SODIUM 50 MCG: 0.03 TABLET ORAL at 04:01

## 2023-01-24 RX ADMIN — QUETIAPINE 50 MG: 25 TABLET ORAL at 08:01

## 2023-01-24 RX ADMIN — TAMSULOSIN HYDROCHLORIDE 0.4 MG: 0.4 CAPSULE ORAL at 08:01

## 2023-01-24 NOTE — PT/OT/SLP PROGRESS
Physical Therapy Treatment    Patient Name:  Pam Gray   MRN:  [id number]    Recommendations:     Discharge Recommendations: home health PT  Discharge Equipment Recommendations: none  Barriers to discharge:  increased assist with mobility    Assessment:     Pam Gray is a 62 y.o. male admitted with a medical diagnosis of Acute on chronic combined systolic and diastolic CHF, NYHA class 1.  He presents with the following impairments/functional limitations: weakness, impaired endurance, impaired sensation, impaired self care skills, impaired functional mobility, gait instability, impaired balance, impaired cognition, decreased safety awareness, impaired cardiopulmonary response to activity.    Pt agreeable to visit. Pt with sitter present in room. Pt and sitter request RW be left in room for pt to transfer to the bathroom. Pt ambulated 80' x 2 with RW and CGA on RA with SpO2 WNL. Pt required a standing rest break between trials. Pt reports fatigue post ambulation.    Rehab Prognosis: Fair; patient would benefit from acute skilled PT services to address these deficits and reach maximum level of function.    Recent Surgery: * No surgery found *      Plan:     During this hospitalization, patient to be seen 5 x/week to address the identified rehab impairments via gait training, therapeutic activities, therapeutic exercises and progress toward the following goals:    Plan of Care Expires:       Subjective     Chief Complaint: fatigue post ambulation  Patient/Family Comments/goals: to get stronger  Pain/Comfort:  Pain Rating 1: 0/10      Objective:     Communicated with RN prior to session.  Patient found HOB elevated with telemetry (basil) upon PT entry to room.     General Precautions: Standard, fall  Orthopedic Precautions: N/A  Braces: N/A  Respiratory Status: Room air     Functional Mobility:  Bed Mobility:     Supine to Sit: stand by assistance  Sit to Supine: stand by assistance  Transfers:      Sit to Stand:  contact guard assistance with rolling walker  Gait: 2 x 80' RW and CGA with standing rest break in between trials.      AM-PAC 6 CLICK MOBILITY          Treatment & Education:  Pt educated on importance of time OOB, importance of intermittent mobility, safe techniques for transfers/ambulation, discharge recommendations/options, and use of call light for assistance and fall prevention.      Patient left HOB elevated with all lines intact, call button in reach, Rn notified, and sitter present..    GOALS:   Multidisciplinary Problems       Physical Therapy Goals          Problem: Physical Therapy    Goal Priority Disciplines Outcome Goal Variances Interventions   Physical Therapy Goal     PT, PT/OT      Description: Goals to be met by:      Patient will increase functional independence with mobility by performin. Sit to stand transfer with Stand-by Assistance  2. Bed to chair transfer with Contact Guard Assistance using Rolling Walker  3. Gait  x 50 feet with Minimal Assistance using Rolling Walker.                          Time Tracking:     PT Received On: 23  PT Start Time: 1134     PT Stop Time: 1149  PT Total Time (min): 15 min     Billable Minutes: Gait Training 15    Treatment Type: Treatment  PT/PTA: PTA     PTA Visit Number: 1     2023

## 2023-01-24 NOTE — NURSING
IV removed. AVS reviewed with patient all questions answered. Report called to Abimbola at [facility] at [phone]. Patient waiting on transportation to facility.

## 2023-01-24 NOTE — PROGRESS NOTES
Nephrology Progress Note        Patient Name: Pam Gray  MRN: [id number]    Patient Class: IP- Inpatient   Admission Date: 1/16/2023  Length of Stay: 5 days  Date of Service: 1/24/2023    Attending Physician: Jace Nunez MD  Primary Care Provider: Jacob Zuniga MD    Reason for Consult: sob/brea/hyperkalemia/hyponatremia/acidosis/chf/anemia/thn    SUBJECTIVE:     HPI: 62M with past medical history of anxiety, arthritis, CHF, CKD stage 3, COPD, CAD, diabetes mellitus, peripheral neuropathy, CVA presented initially on 1/10 to the emergency department at [facility] in [place], [place] via ambulance with complaints of SI and depression. He was sent PECed to [facility] and now brought to [facility] with SOB. Labs show BREA, hyperkalemia. ABG with slight acidosis.    1/17  VSS, K+ at goal.  1.7L UOP recorded.  No complaints.  1/18 VSS. Appreciate cards recommendations and agree with plan.  1/19 genie, BP stable, on 3L NC, UOP 1.8L  1/20 VSS, on 1L NC, UOP 1.4L, no complaints  1/21 no events overnight  1/22  mildly bradycardic, pressures stable, renal function improving.  UOP adequate.  Sleeping, NAD noted.  Awaiting psych placement.  1/23  AFVSS.  No distress.  Uop not recorded completely   1/24  AFVSS.  Doesn't like the food.  No nausea, chest pain, sob, fever, urinary or bowel complaint, new neurologic symptoms, new joint pain  2275 cc uop      Outpatient meds:  No current facility-administered medications on file prior to encounter.     Current Outpatient Medications on File Prior to Encounter   Medication Sig Dispense Refill    albuterol-ipratropium (DUO-NEB) 2.5 mg-0.5 mg/3 mL nebulizer solution Take 3 mLs by nebulization every 6 (six) hours while awake. Rescue 270 mL 0    aspirin 81 MG Chew Take 1 tablet (81 mg total) by mouth once daily. 30 tablet 0    atorvastatin (LIPITOR) 40 MG tablet Take 1 tablet (40 mg total) by mouth once daily. 30 tablet 0    clonazePAM (KLONOPIN) 0.5 MG tablet Take 0.5 mg  by mouth 2 (two) times daily as needed for Anxiety.      doxazosin (CARDURA) 2 MG tablet Take 1 tablet (2 mg total) by mouth once daily. 30 tablet 0    fluticasone furoate-vilanteroL (BREO) 100-25 mcg/dose diskus inhaler Inhale 1 puff into the lungs once daily. Controller 30 each 0    fluvoxaMINE (LUVOX) 25 MG tablet Take 1 tablet (25 mg total) by mouth every evening. 90 tablet 1    gabapentin (NEURONTIN) 300 MG capsule Take 1 capsule by mouth once daily.      insulin aspart U-100 (NOVOLOG) 100 unit/mL (3 mL) InPn pen Inject 1-10 Units into the skin before meals and at bedtime as needed (Hyperglycemia).  0    insulin detemir U-100 (LEVEMIR FLEXTOUCH U-100 INSULN) 100 unit/mL (3 mL) InPn pen Inject 10 Units into the skin every evening.  0    levothyroxine (SYNTHROID) 50 MCG tablet Take 1 tablet (50 mcg total) by mouth once daily.      metoprolol succinate (TOPROL-XL) 25 MG 24 hr tablet Take 1 tablet (25 mg total) by mouth once daily. 30 tablet 0    NIFEdipine (PROCARDIA-XL) 90 MG (OSM) 24 hr tablet Take 1 tablet (90 mg total) by mouth once daily. 30 tablet 0    pantoprazole (PROTONIX) 40 MG tablet Take 1 tablet (40 mg total) by mouth once daily. 30 tablet 0    QUEtiapine (SEROQUEL) 50 MG tablet Take 1 tablet (50 mg total) by mouth 2 (two) times daily. 60 tablet 11    tamsulosin (FLOMAX) 0.4 mg Cap Take 1 capsule (0.4 mg total) by mouth once daily.      tiotropium bromide (SPIRIVA RESPIMAT) 2.5 mcg/actuation inhaler Inhale 2 puffs into the lungs once daily. Controller 4 g 0    [DISCONTINUED] albuterol (PROVENTIL) 2.5 mg /3 mL (0.083 %) nebulizer solution USE 1 VIAL VIA NEBULIZER EVERY 6 HOURS AS NEEDED 7/9/21      [DISCONTINUED] amLODIPine (NORVASC) 10 MG tablet Take 1 tablet (10 mg total) by mouth once daily. 30 tablet 11    [DISCONTINUED] furosemide (LASIX) 80 MG tablet Take 1 tablet (80 mg total) by mouth 2 (two) times daily. 60 tablet 0    [DISCONTINUED] isosorbide-hydrALAZINE 20-37.5 mg (BIDIL) 20-37.5 mg Tab  Take 1 tablet by mouth 2 (two) times daily. 60 tablet 0    [DISCONTINUED] metoprolol tartrate (LOPRESSOR) 50 MG tablet Take 1 tablet (50 mg total) by mouth 2 (two) times daily. 60 tablet 11    [DISCONTINUED] mirtazapine (REMERON) 30 MG tablet Take 30 mg by mouth every evening.      [DISCONTINUED] omeprazole (PRILOSEC) 40 MG capsule Take 1 capsule (40 mg total) by mouth once daily. 30 capsule 11    [DISCONTINUED] pioglitazone (ACTOS) 30 MG tablet Take 30 mg by mouth once daily.      [DISCONTINUED] rivastigmine (EXELON) 4.6 mg/24 hour PT24 Place 1 patch onto the skin once daily. 30 patch 11    [DISCONTINUED] SITagliptin (JANUVIA) 100 MG Tab Take 100 mg by mouth once daily.      [DISCONTINUED] traZODone (DESYREL) 50 MG tablet Take 50 mg by mouth every evening.         Scheduled meds:   aspirin  81 mg Oral Daily    atorvastatin  40 mg Oral Daily    docusate sodium  100 mg Oral Daily    doxazosin  2 mg Oral Daily    [START ON 1/27/2023] fluvoxaMINE  50 mg Oral BID    fluvoxaMINE  50 mg Oral QHS    insulin detemir U-100  5 Units Subcutaneous QHS    levothyroxine  50 mcg Oral Before breakfast    metoprolol succinate  25 mg Oral Daily    NIFEdipine  90 mg Oral Daily    pantoprazole  40 mg Oral Before breakfast    polyethylene glycol  17 g Oral Daily    QUEtiapine  100 mg Oral QHS    tamsulosin  0.4 mg Oral Daily       Infusions:      PRN meds:      Review of Systems:  Neg    OBJECTIVE:     Vital Signs and IO:  Temp:  [97.5 °F (36.4 °C)-97.9 °F (36.6 °C)]   Pulse:  [55-66]   Resp:  [17-18]   BP: (127-148)/(63-81)   SpO2:  [96 %-100 %]   I/O last 3 completed shifts:  In: 1560 [P.O.:1560]  Out: 2575 [Urine:2575]  Wt Readings from Last 5 Encounters:   01/23/23 87 kg (191 lb 12.8 oz)   01/16/23 90.7 kg (200 lb)   01/10/23 89.4 kg (197 lb)   09/14/22 89.5 kg (197 lb 4.8 oz)   08/25/22 91.2 kg (201 lb)     Body mass index is 28.32 kg/m².    Physical Exam  Constitutional:       General: He is not in acute distress.     Appearance:  He is well-developed. She is not diaphoretic.   HENT:      Head: Normocephalic and atraumatic.      Mouth/Throat:      Mouth: Mucous membranes are moist.   Eyes:      General: No scleral icterus.     Pupils: Pupils are equal, round, and reactive to light.   Cardiovascular:      Rate and Rhythm: Normal rate and regular rhythm.   Pulmonary:      Effort: Pulmonary effort is normal. No respiratory distress.      Breath sounds: No stridor.   Abdominal:      General: There is no distension.      Palpations: Abdomen is soft.   Musculoskeletal:         General: No deformity. Normal range of motion.      Cervical back: Neck supple.   Skin:     General: Skin is warm and dry.      Findings: No rash present. No erythema.   Neurological:      Normal  Psychiatric:         Normal    Laboratory:  Recent Labs   Lab 01/22/23  0440 01/23/23  0502 01/24/23  0446    137 139   K 4.1 4.8 4.5    109 110   CO2 23 22* 21*   BUN 42* 46* 53*   CREATININE 2.0* 1.9* 1.9*   GLU 99 108 95         Recent Labs   Lab 01/18/23  0421 01/19/23  0449 01/22/23  0440 01/23/23  0502 01/24/23  0446   CALCIUM 8.6*   < > 8.7 8.7 8.8   ALBUMIN 2.8*   < > 2.8* 3.0* 3.1*   MG 2.5  --   --   --   --     < > = values in this interval not displayed.         Recent Labs   Lab 07/10/21  1030 07/11/21  0625   PTH, Intact 92.2 H 129.7 H         No results for input(s): POCTGLUCOSE in the last 168 hours.    Recent Labs   Lab 07/26/22  0631 09/14/22  1526 01/16/23  1807   Hemoglobin A1C 6.1 H 6.1 H 6.6 H         Recent Labs   Lab 01/24/23  0446   WBC 4.85   HGB 10.4*   HCT 33.5*      MCV 76*   MCHC 31.0*         Recent Labs   Lab 01/22/23  0440 01/23/23  0502 01/24/23  0446   BILITOT 0.2 0.5 0.5   PROT 6.1 6.2 6.2   ALBUMIN 2.8* 3.0* 3.1*   ALKPHOS 57 63 63   ALT 31 28 28   AST 33 27 28         Recent Labs   Lab 07/25/22  1726 09/14/22  1539 01/10/23  1035 01/16/23  1401   Color, UA Yellow Yellow Yellow Yellow   Appearance, UA Clear Cloudy A Clear Clear    pH, UA 6.0 5.0 6.0 6.0   Specific [place], UA 1.020 1.020 >=1.030 A 1.010   Protein, UA 3+ A 2+ A 3+ A 1+ A   Glucose, UA 1+ A Negative Negative Negative   Ketones, UA Negative Negative Negative Negative   Urobilinogen, UA Negative 1.0  --  Negative   Bilirubin (UA) Negative Negative 1+ A Negative   Occult Blood UA Trace A Negative Trace A Negative   Nitrite, UA Negative Negative Negative Negative   RBC, UA 0 4 1 1   WBC, UA 4 3 6 H 14 H   Bacteria Rare Negative None Negative   Hyaline Casts, UA 13 A 3.5 A 1 5 A         Recent Labs   Lab 04/06/22  0953 09/14/22  1550 01/16/23  1047   POC PH 7.365 7.409 7.323 L   POC PCO2 39.9 25.8 L 38.9   POC HCO3 22.3 L 18.5 20.2 L   POC PO2 60.9 L 58.8 LL 60 L   POC SATURATED O2  --  93.9 89 L   POC BE  --   --  -6   Sample  --   --  ARTERIAL         Microbiology Results (last 7 days)       ** No results found for the last 168 hours. **          Echo:  The left ventricle is normal in size with moderate concentric hypertrophy and normal systolic function.  The estimated ejection fraction is 60%.  Indeterminate left ventricular diastolic function.  Normal right ventricular size with normal right ventricular systolic function.  Mild left atrial enlargement.  There is severe aortic valve stenosis.  Aortic valve area is 0.98 cm2; peak velocity is 4.2 m/s; mean gradient is 45 mmHg.  Mild tricuspid regurgitation.  Normal central venous pressure (3 mmHg).  The estimated PA systolic pressure is 16 mmHg.  Mildly elevated mean gradient across mitral valve with normal mitral valve area by pressure half time.     ASSESSMENT/PLAN:     BREA  CKD stage 3, baseline sCr around 2  BPH  - renal function improving   - nonoliguric  - continue flomax  - no nsaids or IV contrast  - dose meds for CrCl 10-50  Stable for d/c from renal standpoint    Severe AS  Valvular CHF  - trop improved with diuresis  - can do lasix PRN    SHPT  Anemia of CKD  - stable    Thank you for allowing us to participate in the  care of your patient!   We will follow the patient and provide recommendations as needed.    Patient care time was spent personally by me on the following activities: > 35 min    Obtaining a history.  Examination of patient.  Providing medical care at the patients bedside.  Developing a treatment plan with patient or surrogate and bedside caregivers.  Ordering and reviewing laboratory studies, radiographic studies, pulse oximetry.  Ordering and performing treatments and interventions.  Evaluation of patient's response to treatment.  Discussions with consultants while on the unit and immediately available to the patient.  Re-evaluation of the patient's condition.  Documentation in the medical record.     Nash Germain MD      [facility]  [address] [place] [address]  [place] [address]    [phone] - tel  [phone] - fax    1/24/2023

## 2023-01-24 NOTE — PLAN OF CARE
Problem: Occupational Therapy  Goal: Occupational Therapy Goal  Description: Goals to be met by: 2/9/2023     Patient will increase functional independence with ADLs by performing:    LE Dressing with Modified [place].  Grooming while standing at sink with Modified [place].  Toileting from toilet with Modified [place] for hygiene and clothing management.   Supine to sit with Modified [place].  Toilet transfer to toilet with Modified [place].    Outcome: Ongoing, Progressing

## 2023-01-24 NOTE — PT/OT/SLP PROGRESS
Occupational Therapy   Treatment    Name: Pam Gray  MRN: [id number]  Admitting Diagnosis:  Acute on chronic combined systolic and diastolic CHF, NYHA class 1       Recommendations:     Discharge Recommendations: home health OT  Discharge Equipment Recommendations:  none  Barriers to discharge:  None    Assessment:     Pam Gray is a 62 y.o. male with a medical diagnosis of Acute on chronic combined systolic and diastolic CHF, NYHA class 1.  He presents with improving medical acuity and functional mobility. Patient participated in bed mobility, LB dressing sitting EOB, toilet transfer, grooming standing at sink and ambulation in the hospital room and bathroom using rolling walker. Performance deficits affecting function are weakness, impaired endurance, impaired self care skills, impaired functional mobility, gait instability, impaired cardiopulmonary response to activity, impaired balance, impaired cognition, decreased safety awareness.     Rehab Prognosis:  Fair; patient would benefit from acute skilled OT services to address these deficits and reach maximum level of function.       Plan:     Patient to be seen 5 x/week to address the above listed problems via self-care/home management, therapeutic activities, therapeutic exercises  Plan of Care Expires: 02/09/23  Plan of Care Reviewed with: patient    Subjective     Pain/Comfort:  Pain Rating 1: 0/10  Pain Rating Post-Intervention 1: 0/10    Objective:     Communicated with: nurse prior to session.  Patient found HOB elevated with telemetry, peripheral IV upon OT entry to room.    General Precautions: Standard, fall    Orthopedic Precautions:N/A  Braces: N/A  Respiratory Status: Room air     Occupational Performance:     Bed Mobility:    Patient completed Scooting/Bridging with contact guard assistance  Patient completed Supine to Sit with contact guard assistance  Patient completed Sit to Supine with contact guard assistance     Functional  Mobility/Transfers:  Patient completed Sit <> Stand Transfer with contact guard assistance  with  rolling walker   Patient completed Toilet Transfer Step Transfer technique with contact guard assistance with  rolling walker  Functional Mobility: ambulated 15 feet in the hospital room and bathroom with contact guard assistance using rolling walker.    Activities of Daily Living:  Grooming: contact guard assistance to wash hands standing at sink.  Lower Body Dressing: contact guard assistance to don/doff socks sitting EOB.      [facility] 6 Click ADL: 19    Treatment & Education:  Patient is making positive progress towards all OT goals.    Patient left HOB elevated with all lines intact, call button in reach, and sitter present    GOALS:   Multidisciplinary Problems       Occupational Therapy Goals          Problem: Occupational Therapy    Goal Priority Disciplines Outcome Interventions   Occupational Therapy Goal     OT, PT/OT Ongoing, Progressing    Description: Goals to be met by: 2/9/2023     Patient will increase functional independence with ADLs by performing:    LE Dressing with Modified [place].  Grooming while standing at sink with Modified [place].  Toileting from toilet with Modified [place] for hygiene and clothing management.   Supine to sit with Modified [place].  Toilet transfer to toilet with Modified [place].                         Time Tracking:     OT Date of Treatment: 01/24/23  OT Start Time: 0959  OT Stop Time: 1011  OT Total Time (min): 12 min    Billable Minutes:Self Care/Home Management 12    OT/JEN: OT          1/24/2023

## 2023-01-24 NOTE — PROGRESS NOTES
[facility] Medicine  Progress Note    Patient Name: Pam Gray  MRN: [id number]  Patient Class: IP- Inpatient   Admission Date: 1/16/2023  Length of Stay: 5 days  Attending Physician: Jace Nunez MD  Primary Care Provider: Jacob Zuniga MD        Subjective:     Principal Problem:Acute on chronic combined systolic and diastolic CHF, NYHA class 1    Interval History:     Assumed care   Off oxygen   Calm . Dr chambers note noted and adjusted meds  Creat 1.9 and stable     Review of Systems  Objective:     Vital Signs (Most Recent):  Temp: 97.5 °F (36.4 °C) (01/24/23 1141)  Pulse: 66 (01/24/23 1141)  Resp: 18 (01/24/23 1141)  BP: 127/81 (01/24/23 1141)  SpO2: 98 % (01/24/23 1141) Vital Signs (24h Range):  Temp:  [97.5 °F (36.4 °C)-97.9 °F (36.6 °C)] 97.5 °F (36.4 °C)  Pulse:  [55-66] 66  Resp:  [17-18] 18  SpO2:  [96 %-100 %] 98 %  BP: (127-148)/(63-81) 127/81     Weight: 87 kg (191 lb 12.8 oz)  Body mass index is 28.32 kg/m².    Intake/Output Summary (Last 24 hours) at 1/24/2023 1410  Last data filed at 1/24/2023 0500  Gross per 24 hour   Intake 1000 ml   Output 1250 ml   Net -250 ml      Physical Exam  General: Patient resting comfortably in no acute distress. Appears as stated age. Calm  Eyes: EOM intact. No conjunctivae injection. No scleral icterus.  ENT: Hearing grossly intact. No discharge from ears. No nasal discharge.   CVS: RRR. No LE edema BL.  Lungs: . Good breath sounds. No accessory muscle use. No acute respiratory distress  Neuro: non focal , Follows commands. Responds appropriately       Significant Labs: All pertinent labs within the past 24 hours have been reviewed.  BMP:   Recent Labs   Lab 01/24/23  0446   GLU 95      K 4.5      CO2 21*   BUN 53*   CREATININE 1.9*   CALCIUM 8.8     CBC:   Recent Labs   Lab 01/24/23  0446   WBC 4.85   HGB 10.4*   HCT 33.5*          Significant Imaging: I have reviewed all pertinent imaging  results/findings within the past 24 hours.    Assessment/Plan:        62-year-old male patient with multiple comorbidities including severe aortic stenosis, COPD on chronic O2, CAD, CHF who presented from behavioral psych facility complaining of shortness of breath.  Patient is supposed to be on chronic oxygen but was not on oxygen at the time as he has been noncompliant.  Patient is currently on psychiatric hold from the quarter.  He is medically stable for discharge however he is unable to go to psych facility with oxygen.  Psychiatry consult placed today for clearance.         Active Diagnoses:    Diagnosis Date Noted POA    PRINCIPAL PROBLEM:  Acute on chronic combined systolic and diastolic CHF, NYHA class 1 [I50.43] 01/16/2023 Yes    severe Aortic stenosis [I35.0] 01/24/2023 Unknown    Hyponatremia [E87.1] 01/16/2023 Yes    Acute respiratory failure with hypoxia [J96.01] 01/16/2023 Yes    Type 2 diabetes mellitus with neurologic complication, with long-term current use of insulin [E11.49, Z79.4] 09/15/2022 Not Applicable    Steroid-induced hyperglycemia [R73.9, T38.0X5A] 08/13/2022 Yes    MDD (major depressive disorder), recurrent episode, moderate [F33.1] 07/26/2022 Yes    Microcytic anemia [D50.9] 07/26/2022 Yes    Vascular dementia with delirium [F01.50, F05] 04/10/2022 Yes    Methamphetamine / cocaine dependence [F15.20] 04/10/2022 Yes    Hypothyroid [E03.9] 04/10/2022 Yes    CAD (coronary artery disease) [I25.10] 04/10/2022 Yes    Hyperkalemia [E87.5] 07/13/2021 Yes    Stage 3 severe COPD by GOLD classification [J44.9] 07/09/2021 Yes    Hypertension [I10]  Yes    AYDEE on CPAP [G47.33, Z99.89]  Not Applicable      Problems Resolved During this Admission:       PLAN   Patient is off oxygen and medically cleared for DC  back to psych facility for voluntary admit   Follow psychiatry recommendations and adjust doses of psych medications  Marva amosn  Cardio recommends 20 mg daily p.r.n. for outpatient  diuresis.  Echo w/EF 60%, suspect some degree of diastolic dysfx as well as severe AS given heart failure signs   SAVR vs TAVR, but would be fairly high risk and follow up with cardio as OP   d/w case Mx , trying send back to [facility]    BREA and hyperkalemia resolved   CEC  today         VTE Risk Mitigation (From admission, onward)           Ordered     Reason for No Pharmacological VTE Prophylaxis  Once        Question:  Reasons:  Answer:  Physician Provided (leave comment)    23 1353     Place RADHA hose  Until discontinued         23 1353     IP VTE HIGH RISK PATIENT  Once         23 1353                       Jace Nunez MD  Department of Hospital Medicine   [facility]

## 2023-01-24 NOTE — PLAN OF CARE
Per Stephanie with [facility], patient is medically cleared to return to their [facility] facility under a voluntary basis.  Formal Voluntary Admission form received via fax and signed by patient, then scanned into  and faxed to [facility] at [phone].  Discharge orders faxed to this number as well.  Number for report and the original FVA form to go with patient given to nurse Fish.  ADT30 order placed for the [facility] to arrange transportation to [facility].        01/24/23 1648   Final Note   Assessment Type Final Discharge Note   Anticipated Discharge Disposition Psych   Hospital Resources/Appts/Education Provided Post-Acute resouces added to AVS   Post-Acute Status   Post-Acute Authorization Placement   Post-Acute Placement Status Set-up Complete/Auth obtained   Discharge Delays (!) Ambulance Transport/Facility Transport

## 2023-01-24 NOTE — PLAN OF CARE
Problem: Physical Therapy  Goal: Physical Therapy Goal  Description: Goals to be met by:      Patient will increase functional independence with mobility by performin. Sit to stand transfer with Stand-by Assistance  2. Bed to chair transfer with Contact Guard Assistance using Rolling Walker  3. Gait  x 50 feet with Minimal Assistance using Rolling Walker.     Outcome: Ongoing, Progressing

## 2023-01-24 NOTE — PLAN OF CARE
1430:  Received secure email from Alena with [facility] requesting more updated clinical information.  All requested information faxed via [employer] to [phone].  Will await further response from [email or web address].     1045:  Noted inpatient psychiatry's recommendation for voluntary inpatient mental health admission.  Spoke with Stephanie at [facility] who requested updated clinicals be faxed to [phone].  Clinicals sent, awaiting further instructions from [email or web address].        01/24/23 1047   Post-Acute Status   Post-Acute Authorization Placement   Post-Acute Placement Status Pending post-acute provider review/more information requested

## 2023-01-24 NOTE — CARE UPDATE
01/24/23 0818   Home Oxygen Qualification   $ Home O2 Qualification Pulmonary Stress Test/6 min walk;Tech time 15 minutes   Room Air SpO2 At Rest 98 %   Room Air SpO2 During Ambulation 97 %   Home O2 Eval Comments pt doesn't qualify for home O2

## 2023-01-24 NOTE — CARE UPDATE
01/24/23 0803   Patient Assessment/Suction   Level of Consciousness (AVPU) alert   Respiratory Effort Normal;Unlabored   Expansion/Accessory Muscles/Retractions no use of accessory muscles;no retractions;expansion symmetric   All Lung Fields Breath Sounds clear   Rhythm/Pattern, Respiratory unlabored;pattern regular;depth regular;chest wiggle adequate;no shortness of breath reported   Cough Frequency no cough   PRE-TX-O2   Device (Oxygen Therapy) room air   SpO2 96 %   Pulse Oximetry Type Intermittent   $ Pulse Oximetry - Multiple Charge Pulse Oximetry - Multiple   Pulse 60   Resp 18   Aerosol Therapy   $ Aerosol Therapy Charges PRN treatment not required   Education   $ Education Bronchodilator;15 min   Respiratory Evaluation   $ Care Plan Tech Time 15 min

## 2023-01-25 NOTE — PT/OT/SLP DISCHARGE
Occupational Therapy Discharge Summary    Pam Gray  MRN: [id number]   Principal Problem: Acute on chronic combined systolic and diastolic CHF, NYHA class 1      Patient Discharged from acute Occupational Therapy on 1/24/2023.  Please refer to prior OT note dated 1/24/2023 for functional status.    Assessment:      Patient appropriate for care in another setting.    Objective:     GOALS:   Multidisciplinary Problems       Occupational Therapy Goals       Not on file              Multidisciplinary Problems (Resolved)          Problem: Occupational Therapy    Goal Priority Disciplines Outcome Interventions   Occupational Therapy Goal   (Resolved)     OT, PT/OT Met    Description: Goals to be met by: 2/9/2023     Patient will increase functional independence with ADLs by performing:    LE Dressing with Modified [place].  Grooming while standing at sink with Modified [place].  Toileting from toilet with Modified [place] for hygiene and clothing management.   Supine to sit with Modified [place].  Toilet transfer to toilet with Modified [place].                         Reasons for Discontinuation of Therapy Services  Transfer to alternate level of care.      Plan:     Patient Discharged to:  [facility]    1/25/2023

## 2023-01-26 NOTE — HOSPITAL COURSE
HPI: Pam Gray is a 62 y.o. male with a history as  has a past medical history of Anxiety, Arthritis, CHF (congestive heart failure), Chronic kidney disease, COPD (chronic obstructive pulmonary disease), Coronary artery disease, Diabetes mellitus, Erectile dysfunction, Hypertension, Necrotizing fasciitis of forearm, Peripheral neuropathy, Stroke, and Thyroid disease. who presented to the ED with a Shortness of Breath (Pt coming from [facility] complaining of SOB. Pt was 79% on room air. Pt states that he is suppose to be on oxygen at all times. Received duo nebulizer en route to ed.)     Patient seen today in the ED observe on BiPAP on a 40% of oxygen able to carry on conversation report loss wife 2 months ago. He was recently admitted  to [facility] approximately a week ago and states he was without his oxygen, however endorses oxygen dependent (on home oxygen at 2 L per nasal cannula) for COPD. Report low energy.  He also states he did feel short of breath but was told his oxygen levels were low today.  Patient also reports smoking some type of synthetic (illegal drug).      Denies fever, chills, diaphoresis, dizziness, HA, chest pain, palpitations, NVD, recent trauma or any other associated symptoms.  Does smoke cigarettes, drink or do illegal drugs.      Lab and imaging obtained and reviewed CBC showed WBC 8.0, H/H 10.4/34.0, Cl 1 MCH 24.1,RDW 16.1 .  Chemistry profile shows sodium 132,  potassium 5.7, BUN 61, creatinine 3.2, eGFR 21.1 . BNP 1,225 with Troponin High sensitivity 73.7 EKG shows NSR on admit . 98.8 respiratory 18, b/p 125/58 SpO2 88%       Hospital course    62-year-old male patient with multiple comorbidities including severe aortic stenosis, COPD on chronic O2, CAD, CHF who presented from behavioral psych facility complaining of shortness of breath.    Patient is supposed to be on chronic oxygen but was not on oxygen at the time as he has been noncompliant.   Patient is currently on psychiatric hold from the quarter.   Appear is having COPD/heart failure exacerbation complicated with severe aortic stenosis and IV Lasix was given and cardiology consulted  They are considering possible TAVR in the future date and later patient went back into clinically baseline  Later He is medically stable for discharge and he is off oxygen and discharged back to the [facility] facility in stable condition.

## 2023-01-26 NOTE — DISCHARGE SUMMARY
[facility] Medicine  Discharge Summary      Patient Name: Pam Gray  MRN: [id number]  ARIANA: [id number]  Patient Class: IP- Inpatient  Admission Date: 1/16/2023  Hospital Length of Stay: 5 days  Discharge Date and Time:  01/25/2023 9:12 PM  Attending Physician: No att. providers found   Discharging Provider: Jace Nunez MD  Primary Care Provider: Jacob Zuniga MD    Primary Care Team: Networked reference to record PCT     HPI:   Pam Gray is a 62 y.o. male with a history as  has a past medical history of Anxiety, Arthritis, CHF (congestive heart failure), Chronic kidney disease, COPD (chronic obstructive pulmonary disease), Coronary artery disease, Diabetes mellitus, Erectile dysfunction, Hypertension, Necrotizing fasciitis of forearm, Peripheral neuropathy, Stroke, and Thyroid disease. who presented to the ED with a Shortness of Breath (Pt coming from [facility] complaining of SOB. Pt was 79% on room air. Pt states that he is suppose to be on oxygen at all times. Received duo nebulizer en route to ed.)    Patient seen today in the ED observe on BiPAP on a 40% of oxygen able to carry on conversation report loss wife 2 months ago. He was recently admitted  to [facility] approximately a week ago and states he was without his oxygen, however endorses oxygen dependent (on home oxygen at 2 L per nasal cannula) for COPD. Report low energy.  He also states he did feel short of breath but was told his oxygen levels were low today.  Patient also reports smoking some type of synthetic (illegal drug). UDS +amphetamines.    Denies fever, chills, diaphoresis, dizziness, HA, chest pain, palpitations, NVD, recent trauma or any other associated symptoms.  Does smoke cigarettes, drink or do illegal drugs.     Lab and imaging obtained and reviewed CBC showed WBC 8.0, H/H 10.4/34.0, Cl 1 MCH 24.1,RDW 16.1 .  Chemistry profile shows sodium 132,  potassium  5.7, BUN 61, creatinine 3.2, eGFR 21.1 . BNP 1,225 with Troponin High sensitivity 73.7 EKG shows NSR on admit . 98.8 respiratory 18, b/p 125/58 SpO2 88%    CXR  IMPRESSION  Right lung base airspace opacities, potentially atelectasis, or pneumonia in the appropriate clinical setting.     Echo 7/25/22  1. The study quality is average.   2. Global left ventricular systolic function is normal. The left   ventricular ejection fraction is 50-55%.   3. Severe aortic valve stenosis is present. Aortic valve area   continuity equation is 0.9 cm?.     4. Moderate calcification of the aortic valve is noted.  Moderate   mitral annular calcification is noted.     5. The left atrium is mildly enlarged. Left atrial diameter is 4.6   cms.     6. Moderate (2+) aortic regurgitation. Mild (1+) mitral regurgitation.   Trace tricuspid regurgitation.     7. The pulmonary artery appears normal.     Per ED provider patient presented to the ED with a complaint of shortness of breath history of CKD CHF COPD with O2 at home. patient recently have a suicide ideation was admitted in the [facility] for 5 to 7days w/o oxygen . In the ER Spo2 in the 70's on room air was put on 5 L oxygen trend up , currently BiPAP. Elevated BUN/CR 61/ 33 with baseline Cr 2. acute on chronic kidney disease nephrology was consulted.                     * No surgery found *      Hospital Course:   HPI: Pam Gray is a 62 y.o. male with a history as  has a past medical history of Anxiety, Arthritis, CHF (congestive heart failure), Chronic kidney disease, COPD (chronic obstructive pulmonary disease), Coronary artery disease, Diabetes mellitus, Erectile dysfunction, Hypertension, Necrotizing fasciitis of forearm, Peripheral neuropathy, Stroke, and Thyroid disease. who presented to the ED with a Shortness of Breath (Pt coming from [facility] complaining of SOB. Pt was 79% on room air. Pt states that he is suppose to be on oxygen at all  times. Received duo nebulizer en route to ed.)     Patient seen today in the ED observe on BiPAP on a 40% of oxygen able to carry on conversation report loss wife 2 months ago. He was recently admitted  to [facility] approximately a week ago and states he was without his oxygen, however endorses oxygen dependent (on home oxygen at 2 L per nasal cannula) for COPD. Report low energy.  He also states he did feel short of breath but was told his oxygen levels were low today.  Patient also reports smoking some type of synthetic (illegal drug).      Denies fever, chills, diaphoresis, dizziness, HA, chest pain, palpitations, NVD, recent trauma or any other associated symptoms.  Does smoke cigarettes, drink or do illegal drugs.      Lab and imaging obtained and reviewed CBC showed WBC 8.0, H/H 10.4/34.0, Cl 1 MCH 24.1,RDW 16.1 .  Chemistry profile shows sodium 132,  potassium 5.7, BUN 61, creatinine 3.2, eGFR 21.1 . BNP 1,225 with Troponin High sensitivity 73.7 EKG shows NSR on admit . 98.8 respiratory 18, b/p 125/58 SpO2 88%       Hospital course    62-year-old male patient with multiple comorbidities including severe aortic stenosis, COPD on chronic O2, CAD, CHF who presented from behavioral psych facility complaining of shortness of breath.    Patient is supposed to be on chronic oxygen but was not on oxygen at the time as he has been noncompliant.  Patient is currently [facility] from the [facility].   Appear is having COPD/heart failure exacerbation complicated with severe aortic stenosis and IV Lasix was given and cardiology consulted  They are considering possible TAVR in the future date and later patient went back into clinically baseline  Later He is medically stable for discharge and he is off oxygen and discharged back to the [facility] facility in stable condition.       Goals of Care Treatment Preferences:  Code Status: Full Code      Consults:   Consults (From admission, onward)         Status Ordering Provider     Inpatient consult to Social Work/Case Management  Once        Provider:  (Not yet assigned)    Completed BUCK BEST     Inpatient consult to Cardiology  Once        Provider:  Corwin Fisher MD    Completed SHARONDA SCOTT     Inpatient consult to Nephrology  Once        Provider:  Dave Harmon MD    Completed DEBBIE ROY          No new Assessment & Plan notes have been filed under this hospital service since the last note was generated.  Service: Hospital Medicine    Final Active Diagnoses:    Diagnosis Date Noted POA    PRINCIPAL PROBLEM:  Acute on chronic combined systolic and diastolic CHF, NYHA class 1 [I50.43] 01/16/2023 Yes    severe Aortic stenosis [I35.0] 01/24/2023 Unknown    Hyponatremia [E87.1] 01/16/2023 Yes    Acute respiratory failure with hypoxia [J96.01] 01/16/2023 Yes    Type 2 diabetes mellitus with neurologic complication, with long-term current use of insulin [E11.49, Z79.4] 09/15/2022 Not Applicable    Steroid-induced hyperglycemia [R73.9, T38.0X5A] 08/13/2022 Yes    MDD (major depressive disorder), recurrent episode, moderate [F33.1] 07/26/2022 Yes    Microcytic anemia [D50.9] 07/26/2022 Yes    Vascular dementia with delirium [F01.50, F05] 04/10/2022 Yes    Methamphetamine / cocaine dependence [F15.20] 04/10/2022 Yes    Hypothyroid [E03.9] 04/10/2022 Yes    CAD (coronary artery disease) [I25.10] 04/10/2022 Yes    Hyperkalemia [E87.5] 07/13/2021 Yes    Stage 3 severe COPD by GOLD classification [J44.9] 07/09/2021 Yes    Hypertension [I10]  Yes    AYDEE on CPAP [G47.33, Z99.89]  Not Applicable      Problems Resolved During this Admission:       Discharged Condition: good    Disposition: Psychiatric Hospital    Follow Up:   Follow-up Information     Jacob Zuniga MD Follow up in 1 month(s).    Specialty: Family Medicine  Contact information:  [address] [place] [id number] [phone]                       Patient  Instructions:      Diet diabetic     Activity as tolerated       Significant Diagnostic Studies: Labs:   CMP   Recent Labs   Lab 01/24/23  0446      K 4.5      CO2 21*   GLU 95   BUN 53*   CREATININE 1.9*   CALCIUM 8.8   PROT 6.2   ALBUMIN 3.1*   BILITOT 0.5   ALKPHOS 63   AST 28   ALT 28   ANIONGAP 8    and CBC   Recent Labs   Lab 01/24/23  0446   WBC 4.85   HGB 10.4*   HCT 33.5*          Pending Diagnostic Studies:     None         Medications:  Reconciled Home Medications:      Medication List      CHANGE how you take these medications    clonazePAM 0.5 MG tablet  Commonly known as: KlonoPIN  Take 0.5 mg by mouth 2 (two) times daily as needed for Anxiety.  What changed: Another medication with the same name was removed. Continue taking this medication, and follow the directions you see here.     fluvoxaMINE 50 MG Tab tablet  Commonly known as: LUVOX  Take 1 tablet (50 mg total) by mouth every evening.  What changed:   · medication strength  · how much to take     QUEtiapine 100 MG Tab  Commonly known as: SEROQUEL  Take 1 tablet (100 mg total) by mouth every evening.  What changed:   · medication strength  · how much to take  · when to take this        CONTINUE taking these medications    albuterol-ipratropium 2.5 mg-0.5 mg/3 mL nebulizer solution  Commonly known as: DUO-NEB  Take 3 mLs by nebulization every 6 (six) hours while awake. Rescue     aspirin 81 MG Chew  Take 1 tablet (81 mg total) by mouth once daily.     atorvastatin 40 MG tablet  Commonly known as: LIPITOR  Take 1 tablet (40 mg total) by mouth once daily.     doxazosin 2 MG tablet  Commonly known as: CARDURA  Take 1 tablet (2 mg total) by mouth once daily.     fluticasone furoate-vilanteroL 100-25 mcg/dose diskus inhaler  Commonly known as: BREO  Inhale 1 puff into the lungs once daily. Controller     gabapentin 300 MG capsule  Commonly known as: NEURONTIN  Take 1 capsule by mouth once daily.     insulin aspart U-100 100 unit/mL (3  mL) Inpn pen  Commonly known as: NovoLOG  Inject 1-10 Units into the skin before meals and at bedtime as needed (Hyperglycemia).     LEVEMIR FLEXTOUCH U-100 INSULN 100 unit/mL (3 mL) Inpn pen  Generic drug: insulin detemir U-100  Inject 10 Units into the skin every evening.     levothyroxine 50 MCG tablet  Commonly known as: SYNTHROID  Take 1 tablet (50 mcg total) by mouth once daily.     metoprolol succinate 25 MG 24 hr tablet  Commonly known as: TOPROL-XL  Take 1 tablet (25 mg total) by mouth once daily.     NIFEdipine 90 MG (OSM) 24 hr tablet  Commonly known as: PROCARDIA-XL  Take 1 tablet (90 mg total) by mouth once daily.     pantoprazole 40 MG tablet  Commonly known as: PROTONIX  Take 1 tablet (40 mg total) by mouth once daily.     tamsulosin 0.4 mg Cap  Commonly known as: FLOMAX  Take 1 capsule (0.4 mg total) by mouth once daily.     tiotropium bromide 2.5 mcg/actuation inhaler  Commonly known as: SPIRIVA RESPIMAT  Inhale 2 puffs into the lungs once daily. Controller            Indwelling Lines/Drains at time of discharge:   Lines/Drains/Airways     None               General: Patient resting comfortably in no acute distress. Appears as stated age. Calm  Eyes: EOM intact. No conjunctivae injection. No scleral icterus.  ENT: Hearing grossly intact. No discharge from ears. No nasal discharge.   CVS: RRR. No LE edema BL.  Lungs: CTA BL, no wheezing or crackles. Good breath sounds. No accessory muscle use. No acute respiratory distress  Neuro: non focal , Follows commands. Responds appropriately  Time spent on the discharge of patient: 33 minutes         Jace Nunez MD  Department of Hospital Medicine  [facility]

## 2023-04-17 PROBLEM — J96.01 ACUTE RESPIRATORY FAILURE WITH HYPOXIA: Status: RESOLVED | Noted: 2023-01-16 | Resolved: 2023-04-17

## 2023-06-14 PROBLEM — K80.20 CALCULUS OF GALLBLADDER WITHOUT CHOLECYSTITIS WITHOUT OBSTRUCTION: Status: ACTIVE | Noted: 2023-06-14

## 2023-06-14 PROBLEM — K70.31 ALCOHOLIC CIRRHOSIS OF LIVER WITH ASCITES: Status: ACTIVE | Noted: 2023-06-14

## 2023-06-14 PROBLEM — N28.1 RENAL CYSTS, ACQUIRED, BILATERAL: Status: ACTIVE | Noted: 2023-06-14

## 2023-06-14 PROBLEM — E11.42 TYPE 2 DIABETES MELLITUS WITH DIABETIC POLYNEUROPATHY, WITH LONG-TERM CURRENT USE OF INSULIN: Status: ACTIVE | Noted: 2022-09-15

## 2023-06-14 PROBLEM — R33.9 URINARY RETENTION WITH INCOMPLETE BLADDER EMPTYING: Status: ACTIVE | Noted: 2023-06-14

## 2023-06-14 PROBLEM — E53.8 VITAMIN B12 DEFICIENCY: Status: ACTIVE | Noted: 2023-06-14

## 2023-07-20 PROBLEM — R76.8 HEPATITIS C ANTIBODY POSITIVE IN BLOOD: Status: ACTIVE | Noted: 2023-07-20

## 2023-11-02 PROBLEM — M62.81 MUSCLE WEAKNESS (GENERALIZED): Status: ACTIVE | Noted: 2023-11-02

## 2024-01-09 PROBLEM — Z78.9 SELF-CARE DEFICIT: Status: ACTIVE | Noted: 2024-01-09

## 2024-01-09 PROBLEM — I50.23 ACUTE ON CHRONIC HFREF (HEART FAILURE WITH REDUCED EJECTION FRACTION): Status: ACTIVE | Noted: 2022-04-10

## 2024-01-09 PROBLEM — K74.60 CIRRHOSIS: Status: ACTIVE | Noted: 2024-01-09

## 2024-01-10 PROBLEM — R07.9 CHEST PAIN: Status: ACTIVE | Noted: 2024-01-10

## 2024-01-10 PROBLEM — E43 SEVERE MALNUTRITION: Status: ACTIVE | Noted: 2024-01-10

## 2024-01-23 PROBLEM — Z79.4 TYPE 2 DIABETES MELLITUS WITH DIABETIC POLYNEUROPATHY, WITH LONG-TERM CURRENT USE OF INSULIN: Chronic | Status: ACTIVE | Noted: 2022-09-15

## 2024-01-23 PROBLEM — E11.42 TYPE 2 DIABETES MELLITUS WITH DIABETIC POLYNEUROPATHY, WITH LONG-TERM CURRENT USE OF INSULIN: Chronic | Status: ACTIVE | Noted: 2022-09-15

## 2024-01-28 PROBLEM — G93.41 ENCEPHALOPATHY, METABOLIC: Status: ACTIVE | Noted: 2022-04-10

## 2024-02-01 PROBLEM — B37.49 CANDIDURIA: Status: ACTIVE | Noted: 2024-02-01

## 2024-02-01 PROBLEM — R54 AGE-RELATED PHYSICAL DEBILITY: Status: ACTIVE | Noted: 2024-02-01

## 2024-02-01 PROBLEM — I35.1 SEVERE AORTIC REGURGITATION: Status: ACTIVE | Noted: 2024-02-01

## 2024-02-01 PROBLEM — F01.B3 MODERATE VASCULAR DEMENTIA WITH MOOD DISTURBANCE: Status: ACTIVE | Noted: 2022-04-10

## 2024-02-20 PROBLEM — I50.82 BIVENTRICULAR CONGESTIVE HEART FAILURE: Status: ACTIVE | Noted: 2024-02-20

## 2024-03-01 PROBLEM — G93.40 ENCEPHALOPATHY: Status: ACTIVE | Noted: 2024-03-01

## 2024-03-01 PROBLEM — N18.9 ACUTE KIDNEY INJURY SUPERIMPOSED ON CHRONIC KIDNEY DISEASE: Status: ACTIVE | Noted: 2022-08-13

## 2024-03-13 PROBLEM — E80.6 HYPERBILIRUBINEMIA: Status: ACTIVE | Noted: 2024-03-13

## 2024-03-13 PROBLEM — F25.0 SCHIZOAFFECTIVE DISORDER, BIPOLAR TYPE: Status: ACTIVE | Noted: 2024-03-13

## 2024-03-14 PROBLEM — E63.9 INADEQUATE DIETARY ENERGY INTAKE: Status: RESOLVED | Noted: 2022-07-28 | Resolved: 2024-03-14
